# Patient Record
Sex: FEMALE | Race: WHITE | Employment: UNEMPLOYED | URBAN - METROPOLITAN AREA
[De-identification: names, ages, dates, MRNs, and addresses within clinical notes are randomized per-mention and may not be internally consistent; named-entity substitution may affect disease eponyms.]

---

## 2017-01-23 ENCOUNTER — HOSPITAL ENCOUNTER (OUTPATIENT)
Dept: OTHER | Age: 57
Discharge: HOME | End: 2017-01-23
Attending: INTERNAL MEDICINE | Admitting: INTERNAL MEDICINE

## 2017-01-23 LAB
ALBUMIN SERPL-MCNC: 3.9 GM/DL (ref 3.4–5)
ALP BLD-CCNC: 93 IU/L (ref 40–128)
ALT SERPL-CCNC: 12 U/L (ref 10–40)
ANION GAP SERPL CALCULATED.3IONS-SCNC: 17 MMOL/L (ref 4–16)
AST SERPL-CCNC: 12 IU/L (ref 15–37)
ATYPICAL LYMPHOCYTE ABSOLUTE COUNT: ABNORMAL
BANDED NEUTROPHILS ABSOLUTE COUNT: 0.07 K/CU MM
BANDED NEUTROPHILS RELATIVE PERCENT: 1 % (ref 5–11)
BASOPHILS ABSOLUTE: 0.1 K/CU MM
BASOPHILS RELATIVE PERCENT: 2 % (ref 0–1)
BILIRUB SERPL-MCNC: 0.2 MG/DL (ref 0–1)
BUN BLDV-MCNC: 13 MG/DL (ref 6–23)
CALCIUM SERPL-MCNC: 9.5 MG/DL (ref 8.3–10.6)
CHLORIDE BLD-SCNC: 90 MMOL/L (ref 99–110)
CO2: 25 MMOL/L (ref 21–32)
CREAT SERPL-MCNC: 0.7 MG/DL (ref 0.6–1.1)
DIFFERENTIAL TYPE: ABNORMAL
EOSINOPHILS ABSOLUTE: 0.2 K/CU MM
EOSINOPHILS RELATIVE PERCENT: 3 % (ref 0–3)
GFR AFRICAN AMERICAN: >60 ML/MIN/1.73M2
GFR NON-AFRICAN AMERICAN: >60 ML/MIN/1.73M2
GLUCOSE FASTING: 350 MG/DL (ref 70–99)
HCT VFR BLD CALC: 38.4 % (ref 37–47)
HEMOGLOBIN: 13 GM/DL (ref 12.5–16)
LYMPHOCYTES ABSOLUTE: 2.6 K/CU MM
LYMPHOCYTES RELATIVE PERCENT: 38 % (ref 24–44)
LYMPHOCYTES RELATIVE PERCENT: ABNORMAL
MCH RBC QN AUTO: 30.4 PG (ref 27–31)
MCHC RBC AUTO-ENTMCNC: 33.9 % (ref 32–36)
MCV RBC AUTO: 89.9 FL (ref 78–100)
MONOCYTES ABSOLUTE: 0.1 K/CU MM
MONOCYTES RELATIVE PERCENT: 2 % (ref 0–4)
PDW BLD-RTO: 12.1 % (ref 11.7–14.9)
PLATELET # BLD: 288 K/CU MM (ref 140–440)
PMV BLD AUTO: 11.6 FL (ref 7.5–11.1)
POTASSIUM SERPL-SCNC: 4.4 MMOL/L (ref 3.5–5.1)
RBC # BLD: 4.27 M/CU MM (ref 4.2–5.4)
RBC # BLD: ABNORMAL 10*6/UL
SEGMENTED NEUTROPHILS ABSOLUTE COUNT: 3.8 K/CU MM
SEGMENTED NEUTROPHILS RELATIVE PERCENT: 54 % (ref 36–66)
SODIUM BLD-SCNC: 132 MMOL/L (ref 135–145)
TOTAL PROTEIN: 6.7 GM/DL (ref 6.4–8.2)
WBC # BLD: 6.9 K/CU MM (ref 4–10.5)

## 2017-01-26 LAB — CA 27.29: 19 U/ML

## 2017-03-17 ENCOUNTER — HOSPITAL ENCOUNTER (OUTPATIENT)
Dept: CARDIOLOGY | Age: 57
Discharge: HOME | End: 2017-03-17
Attending: INTERNAL MEDICINE | Admitting: INTERNAL MEDICINE

## 2017-03-17 DIAGNOSIS — R55 SYNCOPE AND COLLAPSE: ICD-10-CM

## 2017-03-17 LAB
CORTISOL - AM: 21.8 UG/DL (ref 6–18.4)
LV EF: 48 %
LVEF MODALITY: NORMAL

## 2017-07-17 ENCOUNTER — HOSPITAL ENCOUNTER (OUTPATIENT)
Dept: OTHER | Age: 57
Discharge: HOME | End: 2017-07-17
Attending: INTERNAL MEDICINE | Admitting: INTERNAL MEDICINE

## 2017-07-17 LAB
ALBUMIN SERPL-MCNC: 4 GM/DL (ref 3.4–5)
ALP BLD-CCNC: 98 IU/L (ref 40–129)
ALT SERPL-CCNC: 13 U/L (ref 10–40)
ANION GAP SERPL CALCULATED.3IONS-SCNC: 14 MMOL/L (ref 4–16)
AST SERPL-CCNC: 12 IU/L (ref 15–37)
ATYPICAL LYMPHOCYTE ABSOLUTE COUNT: ABNORMAL
BANDED NEUTROPHILS ABSOLUTE COUNT: 0.1 K/CU MM
BANDED NEUTROPHILS RELATIVE PERCENT: 1 % (ref 5–11)
BASOPHILS ABSOLUTE: 0.2 K/CU MM
BASOPHILS RELATIVE PERCENT: 2 % (ref 0–1)
BILIRUB SERPL-MCNC: 0.2 MG/DL (ref 0–1)
BUN BLDV-MCNC: 17 MG/DL (ref 6–23)
CALCIUM SERPL-MCNC: 9.6 MG/DL (ref 8.3–10.6)
CHLORIDE BLD-SCNC: 97 MMOL/L (ref 99–110)
CO2: 28 MMOL/L (ref 21–32)
CREAT SERPL-MCNC: 0.6 MG/DL (ref 0.6–1.1)
DIFFERENTIAL TYPE: ABNORMAL
EOSINOPHILS ABSOLUTE: 0.2 K/CU MM
EOSINOPHILS RELATIVE PERCENT: 2 % (ref 0–3)
GFR AFRICAN AMERICAN: >60 ML/MIN/1.73M2
GFR NON-AFRICAN AMERICAN: >60 ML/MIN/1.73M2
GLUCOSE BLD-MCNC: 153 MG/DL (ref 70–140)
HCT VFR BLD CALC: 41.9 % (ref 37–47)
HEMOGLOBIN: 14.1 GM/DL (ref 12.5–16)
LYMPHOCYTES ABSOLUTE: 4.9 K/CU MM
LYMPHOCYTES RELATIVE PERCENT: 50 % (ref 24–44)
MCH RBC QN AUTO: 29.4 PG (ref 27–31)
MCHC RBC AUTO-ENTMCNC: 33.7 % (ref 32–36)
MCV RBC AUTO: 87.5 FL (ref 78–100)
MONOCYTES ABSOLUTE: 0.5 K/CU MM
MONOCYTES RELATIVE PERCENT: 5 % (ref 0–4)
PDW BLD-RTO: 12.9 % (ref 11.7–14.9)
PLATELET # BLD: 292 K/CU MM (ref 140–440)
PMV BLD AUTO: 10.3 FL (ref 7.5–11.1)
POTASSIUM SERPL-SCNC: 4.4 MMOL/L (ref 3.5–5.1)
RBC # BLD: 4.79 M/CU MM (ref 4.2–5.4)
SEGMENTED NEUTROPHILS ABSOLUTE COUNT: 3.9 K/CU MM
SEGMENTED NEUTROPHILS RELATIVE PERCENT: 40 % (ref 36–66)
SODIUM BLD-SCNC: 139 MMOL/L (ref 135–145)
TOTAL PROTEIN: 6.9 GM/DL (ref 6.4–8.2)
WBC # BLD: 9.8 K/CU MM (ref 4–10.5)

## 2017-07-19 LAB — CA 27.29: 16.2 U/ML

## 2017-07-25 ENCOUNTER — HOSPITAL ENCOUNTER (OUTPATIENT)
Dept: PHYSICAL THERAPY | Age: 57
Discharge: HOME | End: 2017-07-25
Attending: INTERNAL MEDICINE | Admitting: INTERNAL MEDICINE

## 2018-02-15 ENCOUNTER — HOSPITAL ENCOUNTER (OUTPATIENT)
Dept: OTHER | Age: 58
Discharge: HOME | End: 2018-02-15
Attending: INTERNAL MEDICINE | Admitting: INTERNAL MEDICINE

## 2018-02-15 LAB
ALBUMIN SERPL-MCNC: 3.9 GM/DL (ref 3.4–5)
ALP BLD-CCNC: 116 IU/L (ref 40–128)
ALT SERPL-CCNC: 9 U/L (ref 10–40)
ANION GAP SERPL CALCULATED.3IONS-SCNC: 13 MMOL/L (ref 4–16)
AST SERPL-CCNC: 12 IU/L (ref 15–37)
BILIRUB SERPL-MCNC: 0.2 MG/DL (ref 0–1)
BUN BLDV-MCNC: 13 MG/DL (ref 6–23)
CALCIUM SERPL-MCNC: 8.9 MG/DL (ref 8.3–10.6)
CHLORIDE BLD-SCNC: 96 MMOL/L (ref 99–110)
CO2: 27 MMOL/L (ref 21–32)
CREAT SERPL-MCNC: 0.5 MG/DL (ref 0.6–1.1)
FOLLICLE STIMULATING HORMONE: 17.7 MLU/ML
GFR AFRICAN AMERICAN: >60 ML/MIN/1.73M2
GFR NON-AFRICAN AMERICAN: >60 ML/MIN/1.73M2
GLUCOSE BLD-MCNC: 167 MG/DL (ref 70–99)
POTASSIUM SERPL-SCNC: 4.1 MMOL/L (ref 3.5–5.1)
SODIUM BLD-SCNC: 136 MMOL/L (ref 135–145)
TOTAL PROTEIN: 6.7 GM/DL (ref 6.4–8.2)

## 2018-02-17 LAB
CA 27.29: 16.8 U/ML
ESTRADIOL LEVEL: <20

## 2018-08-23 ENCOUNTER — HOSPITAL ENCOUNTER (OUTPATIENT)
Dept: OTHER | Age: 58
Discharge: HOME | End: 2018-08-23
Attending: INTERNAL MEDICINE | Admitting: INTERNAL MEDICINE

## 2018-08-23 LAB
ALBUMIN SERPL-MCNC: 3.8 GM/DL (ref 3.4–5)
ALP BLD-CCNC: 108 IU/L (ref 40–129)
ALT SERPL-CCNC: 8 U/L (ref 10–40)
ANION GAP SERPL CALCULATED.3IONS-SCNC: 17 MMOL/L (ref 4–16)
AST SERPL-CCNC: 10 IU/L (ref 15–37)
BILIRUB SERPL-MCNC: 0.1 MG/DL (ref 0–1)
BUN BLDV-MCNC: 14 MG/DL (ref 6–23)
CALCIUM SERPL-MCNC: 9.1 MG/DL (ref 8.3–10.6)
CHLORIDE BLD-SCNC: 95 MMOL/L (ref 99–110)
CO2: 23 MMOL/L (ref 21–32)
CREAT SERPL-MCNC: 0.5 MG/DL (ref 0.6–1.1)
GFR AFRICAN AMERICAN: >60 ML/MIN/1.73M2
GFR NON-AFRICAN AMERICAN: >60 ML/MIN/1.73M2
GLUCOSE BLD-MCNC: 369 MG/DL (ref 70–99)
POTASSIUM SERPL-SCNC: 4.3 MMOL/L (ref 3.5–5.1)
SODIUM BLD-SCNC: 135 MMOL/L (ref 135–145)
TOTAL PROTEIN: 6.6 GM/DL (ref 6.4–8.2)

## 2018-08-25 LAB — CA 27.29: 17.4 U/ML

## 2019-02-19 ENCOUNTER — HOSPITAL ENCOUNTER (OUTPATIENT)
Age: 59
Setting detail: SPECIMEN
Discharge: HOME | End: 2019-02-19
Payer: COMMERCIAL

## 2019-02-19 LAB
ALBUMIN SERPL-MCNC: 4 GM/DL (ref 3.4–5)
ALP BLD-CCNC: 101 IU/L (ref 40–128)
ALT SERPL-CCNC: 10 U/L (ref 10–40)
ANION GAP SERPL CALCULATED.3IONS-SCNC: 15 MMOL/L (ref 4–16)
AST SERPL-CCNC: 12 IU/L (ref 15–37)
BILIRUB SERPL-MCNC: 0.2 MG/DL (ref 0–1)
BUN BLDV-MCNC: 11 MG/DL (ref 6–23)
CALCIUM SERPL-MCNC: 9 MG/DL (ref 8.3–10.6)
CHLORIDE BLD-SCNC: 98 MMOL/L (ref 99–110)
CO2: 26 MMOL/L (ref 21–32)
CREAT SERPL-MCNC: 0.6 MG/DL (ref 0.6–1.1)
FOLLICLE STIMULATING HORMONE: 16.7 MLU/ML
GFR AFRICAN AMERICAN: >60 ML/MIN/1.73M2
GFR NON-AFRICAN AMERICAN: >60 ML/MIN/1.73M2
GLUCOSE BLD-MCNC: 229 MG/DL (ref 70–99)
LH: 12.5 MIU/L
POTASSIUM SERPL-SCNC: 4.7 MMOL/L (ref 3.5–5.1)
SODIUM BLD-SCNC: 139 MMOL/L (ref 135–145)
TOTAL PROTEIN: 6.9 GM/DL (ref 6.4–8.2)

## 2019-02-19 PROCEDURE — 83001 ASSAY OF GONADOTROPIN (FSH): CPT

## 2019-02-19 PROCEDURE — 80053 COMPREHEN METABOLIC PANEL: CPT

## 2019-02-19 PROCEDURE — 83002 ASSAY OF GONADOTROPIN (LH): CPT

## 2019-02-19 PROCEDURE — 82670 ASSAY OF TOTAL ESTRADIOL: CPT

## 2019-02-19 PROCEDURE — 86300 IMMUNOASSAY TUMOR CA 15-3: CPT

## 2019-02-22 LAB
CA 27.29: 16.6 U/ML
ESTRADIOL LEVEL: <20

## 2019-08-22 ENCOUNTER — HOSPITAL ENCOUNTER (OUTPATIENT)
Age: 59
Setting detail: SPECIMEN
Discharge: HOME | End: 2019-08-22
Payer: COMMERCIAL

## 2019-08-22 LAB
ALBUMIN SERPL-MCNC: 3.9 GM/DL (ref 3.4–5)
ALP BLD-CCNC: 89 IU/L (ref 40–128)
ALT SERPL-CCNC: 9 U/L (ref 10–40)
ANION GAP SERPL CALCULATED.3IONS-SCNC: 11 MMOL/L (ref 4–16)
AST SERPL-CCNC: 12 IU/L (ref 15–37)
BILIRUB SERPL-MCNC: 0.2 MG/DL (ref 0–1)
BUN BLDV-MCNC: 14 MG/DL (ref 6–23)
CALCIUM SERPL-MCNC: 9 MG/DL (ref 8.3–10.6)
CHLORIDE BLD-SCNC: 103 MMOL/L (ref 99–110)
CO2: 27 MMOL/L (ref 21–32)
CREAT SERPL-MCNC: 0.5 MG/DL (ref 0.6–1.1)
GFR AFRICAN AMERICAN: >60 ML/MIN/1.73M2
GFR NON-AFRICAN AMERICAN: >60 ML/MIN/1.73M2
GLUCOSE BLD-MCNC: 199 MG/DL (ref 70–99)
POTASSIUM SERPL-SCNC: 4.4 MMOL/L (ref 3.5–5.1)
SODIUM BLD-SCNC: 141 MMOL/L (ref 135–145)
TOTAL PROTEIN: 6.6 GM/DL (ref 6.4–8.2)

## 2019-08-22 PROCEDURE — 80053 COMPREHEN METABOLIC PANEL: CPT

## 2020-02-24 ENCOUNTER — HOSPITAL ENCOUNTER (OUTPATIENT)
Dept: INFUSION THERAPY | Age: 60
Discharge: HOME | End: 2020-02-24
Payer: COMMERCIAL

## 2020-02-24 LAB
ALBUMIN SERPL-MCNC: 3.9 GM/DL (ref 3.4–5)
ALP BLD-CCNC: 85 IU/L (ref 40–128)
ALT SERPL-CCNC: 10 U/L (ref 10–40)
ANION GAP SERPL CALCULATED.3IONS-SCNC: 11 MMOL/L (ref 4–16)
AST SERPL-CCNC: 12 IU/L (ref 15–37)
BASOPHILS ABSOLUTE: 0 K/CU MM
BASOPHILS RELATIVE PERCENT: 0.5 % (ref 0–1)
BILIRUB SERPL-MCNC: 0.2 MG/DL (ref 0–1)
BUN BLDV-MCNC: 17 MG/DL (ref 6–23)
CALCIUM SERPL-MCNC: 9.1 MG/DL (ref 8.3–10.6)
CHLORIDE BLD-SCNC: 103 MMOL/L (ref 99–110)
CO2: 27 MMOL/L (ref 21–32)
CREAT SERPL-MCNC: 0.5 MG/DL (ref 0.6–1.1)
DIFFERENTIAL TYPE: ABNORMAL
EOSINOPHILS ABSOLUTE: 0.3 K/CU MM
EOSINOPHILS RELATIVE PERCENT: 3.7 % (ref 0–3)
GFR AFRICAN AMERICAN: >60 ML/MIN/1.73M2
GFR NON-AFRICAN AMERICAN: >60 ML/MIN/1.73M2
GLUCOSE BLD-MCNC: 130 MG/DL (ref 70–99)
HCT VFR BLD CALC: 40.6 % (ref 37–47)
HEMOGLOBIN: 13.3 GM/DL (ref 12.5–16)
LYMPHOCYTES ABSOLUTE: 3.1 K/CU MM
LYMPHOCYTES RELATIVE PERCENT: 40.2 % (ref 24–44)
MCH RBC QN AUTO: 28.9 PG (ref 27–31)
MCHC RBC AUTO-ENTMCNC: 32.8 % (ref 32–36)
MCV RBC AUTO: 88.3 FL (ref 78–100)
MONOCYTES ABSOLUTE: 0.6 K/CU MM
MONOCYTES RELATIVE PERCENT: 7.5 % (ref 0–4)
PDW BLD-RTO: 13.7 % (ref 11.7–14.9)
PLATELET # BLD: 299 K/CU MM (ref 140–440)
PMV BLD AUTO: 10.1 FL (ref 7.5–11.1)
POTASSIUM SERPL-SCNC: 4.6 MMOL/L (ref 3.5–5.1)
RBC # BLD: 4.6 M/CU MM (ref 4.2–5.4)
SEGMENTED NEUTROPHILS ABSOLUTE COUNT: 3.7 K/CU MM
SEGMENTED NEUTROPHILS RELATIVE PERCENT: 48.1 % (ref 36–66)
SODIUM BLD-SCNC: 141 MMOL/L (ref 135–145)
TOTAL PROTEIN: 6.4 GM/DL (ref 6.4–8.2)
WBC # BLD: 7.6 K/CU MM (ref 4–10.5)

## 2020-02-24 PROCEDURE — 36415 COLL VENOUS BLD VENIPUNCTURE: CPT

## 2020-02-24 PROCEDURE — 85025 COMPLETE CBC W/AUTO DIFF WBC: CPT

## 2020-02-24 PROCEDURE — 36591 DRAW BLOOD OFF VENOUS DEVICE: CPT

## 2020-02-24 PROCEDURE — 6360000002 HC RX W HCPCS

## 2020-02-24 PROCEDURE — 80053 COMPREHEN METABOLIC PANEL: CPT

## 2020-02-24 RX ORDER — HEPARIN SODIUM (PORCINE) LOCK FLUSH IV SOLN 100 UNIT/ML 100 UNIT/ML
SOLUTION INTRAVENOUS
Status: DISPENSED
Start: 2020-02-24 | End: 2020-02-24

## 2020-08-03 PROBLEM — C50.212 MALIGNANT NEOPLASM OF UPPER-INNER QUADRANT OF LEFT FEMALE BREAST (HCC): Status: ACTIVE | Noted: 2020-08-03

## 2020-08-03 PROBLEM — E86.0 DEHYDRATION: Status: ACTIVE | Noted: 2020-08-03

## 2020-09-02 PROBLEM — E86.0 DEHYDRATION: Status: RESOLVED | Noted: 2020-08-03 | Resolved: 2020-09-02

## 2020-09-02 RX ORDER — TAMOXIFEN CITRATE 20 MG/1
TABLET ORAL
Qty: 30 TABLET | Refills: 0 | Status: SHIPPED | OUTPATIENT
Start: 2020-09-02 | End: 2020-09-14 | Stop reason: SDUPTHER

## 2020-09-08 ENCOUNTER — HOSPITAL ENCOUNTER (OUTPATIENT)
Dept: INFUSION THERAPY | Age: 60
Discharge: HOME | End: 2020-09-08
Payer: COMMERCIAL

## 2020-09-08 DIAGNOSIS — C50.212 MALIGNANT NEOPLASM OF UPPER-INNER QUADRANT OF LEFT FEMALE BREAST, UNSPECIFIED ESTROGEN RECEPTOR STATUS (HCC): Primary | ICD-10-CM

## 2020-09-08 DIAGNOSIS — C50.212 MALIGNANT NEOPLASM OF UPPER-INNER QUADRANT OF LEFT BREAST IN FEMALE, ESTROGEN RECEPTOR POSITIVE (HCC): ICD-10-CM

## 2020-09-08 DIAGNOSIS — Z17.0 MALIGNANT NEOPLASM OF UPPER-INNER QUADRANT OF LEFT BREAST IN FEMALE, ESTROGEN RECEPTOR POSITIVE (HCC): ICD-10-CM

## 2020-09-08 LAB
ALBUMIN SERPL-MCNC: 3.9 GM/DL (ref 3.4–5)
ALP BLD-CCNC: 94 IU/L (ref 40–128)
ALT SERPL-CCNC: 12 U/L (ref 10–40)
ANION GAP SERPL CALCULATED.3IONS-SCNC: 12 MMOL/L (ref 4–16)
AST SERPL-CCNC: 11 IU/L (ref 15–37)
BASOPHILS ABSOLUTE: 0.1 K/CU MM
BASOPHILS RELATIVE PERCENT: 0.8 % (ref 0–1)
BILIRUB SERPL-MCNC: 0.2 MG/DL (ref 0–1)
BUN BLDV-MCNC: 18 MG/DL (ref 6–23)
CALCIUM SERPL-MCNC: 9.2 MG/DL (ref 8.3–10.6)
CHLORIDE BLD-SCNC: 103 MMOL/L (ref 99–110)
CO2: 28 MMOL/L (ref 21–32)
CREAT SERPL-MCNC: 0.6 MG/DL (ref 0.6–1.1)
DIFFERENTIAL TYPE: ABNORMAL
EOSINOPHILS ABSOLUTE: 0.3 K/CU MM
EOSINOPHILS RELATIVE PERCENT: 3.4 % (ref 0–3)
GFR AFRICAN AMERICAN: >60 ML/MIN/1.73M2
GFR NON-AFRICAN AMERICAN: >60 ML/MIN/1.73M2
GLUCOSE BLD-MCNC: 223 MG/DL (ref 70–99)
HCT VFR BLD CALC: 39.3 % (ref 37–47)
HEMOGLOBIN: 13.1 GM/DL (ref 12.5–16)
LYMPHOCYTES ABSOLUTE: 3.3 K/CU MM
LYMPHOCYTES RELATIVE PERCENT: 41.5 % (ref 24–44)
MCH RBC QN AUTO: 29.5 PG (ref 27–31)
MCHC RBC AUTO-ENTMCNC: 33.3 % (ref 32–36)
MCV RBC AUTO: 88.5 FL (ref 78–100)
MONOCYTES ABSOLUTE: 0.6 K/CU MM
MONOCYTES RELATIVE PERCENT: 7.7 % (ref 0–4)
PDW BLD-RTO: 13.8 % (ref 11.7–14.9)
PLATELET # BLD: 290 K/CU MM (ref 140–440)
PMV BLD AUTO: 9.9 FL (ref 7.5–11.1)
POTASSIUM SERPL-SCNC: 4.2 MMOL/L (ref 3.5–5.1)
RBC # BLD: 4.44 M/CU MM (ref 4.2–5.4)
SEGMENTED NEUTROPHILS ABSOLUTE COUNT: 3.7 K/CU MM
SEGMENTED NEUTROPHILS RELATIVE PERCENT: 46.6 % (ref 36–66)
SODIUM BLD-SCNC: 143 MMOL/L (ref 135–145)
TOTAL PROTEIN: 6.4 GM/DL (ref 6.4–8.2)
WBC # BLD: 7.8 K/CU MM (ref 4–10.5)

## 2020-09-08 PROCEDURE — 6360000002 HC RX W HCPCS: Performed by: INTERNAL MEDICINE

## 2020-09-08 PROCEDURE — 80053 COMPREHEN METABOLIC PANEL: CPT

## 2020-09-08 PROCEDURE — 36591 DRAW BLOOD OFF VENOUS DEVICE: CPT

## 2020-09-08 PROCEDURE — 2580000003 HC RX 258: Performed by: INTERNAL MEDICINE

## 2020-09-08 PROCEDURE — 85025 COMPLETE CBC W/AUTO DIFF WBC: CPT

## 2020-09-08 RX ORDER — SODIUM CHLORIDE 0.9 % (FLUSH) 0.9 %
20 SYRINGE (ML) INJECTION PRN
Status: DISCONTINUED | OUTPATIENT
Start: 2020-09-08 | End: 2020-09-09 | Stop reason: HOSPADM

## 2020-09-08 RX ORDER — SODIUM CHLORIDE 0.9 % (FLUSH) 0.9 %
10 SYRINGE (ML) INJECTION PRN
Status: CANCELLED | OUTPATIENT
Start: 2020-09-08

## 2020-09-08 RX ORDER — HEPARIN SODIUM (PORCINE) LOCK FLUSH IV SOLN 100 UNIT/ML 100 UNIT/ML
500 SOLUTION INTRAVENOUS PRN
Status: CANCELLED | OUTPATIENT
Start: 2020-09-08

## 2020-09-08 RX ORDER — HEPARIN SODIUM (PORCINE) LOCK FLUSH IV SOLN 100 UNIT/ML 100 UNIT/ML
500 SOLUTION INTRAVENOUS PRN
Status: DISCONTINUED | OUTPATIENT
Start: 2020-09-08 | End: 2020-09-09 | Stop reason: HOSPADM

## 2020-09-08 RX ORDER — SODIUM CHLORIDE 0.9 % (FLUSH) 0.9 %
20 SYRINGE (ML) INJECTION PRN
Status: CANCELLED | OUTPATIENT
Start: 2020-09-08

## 2020-09-08 RX ADMIN — Medication 500 UNITS: at 09:33

## 2020-09-08 RX ADMIN — SODIUM CHLORIDE, PRESERVATIVE FREE 20 ML: 5 INJECTION INTRAVENOUS at 09:33

## 2020-09-08 NOTE — PROGRESS NOTES
Blood draw via right chest mediport. Labs obtained. Flushed per protocol, emely duvall'd, bandage applied. Tolerated well.

## 2020-09-10 NOTE — PROGRESS NOTES
Patient Name: Fernando Mendoza  Patient : 1960  Patient MRN: [id number]     Primary Oncologist: Day Bustillo MD  Referring Provider: Dionisio Hutchinson MD     Date of Service: 2020      Chief Complaint:   Chief Complaint   Patient presents with    Medication Refill    Discuss Labs     results     She came in for follow-up visit. Patient Active Problem List:     Malignant neoplasm of upper-inner quadrant of left female breast [facility])     HPI:   Mrs. Roxann Cranker is a pleasant 51-year-old  female patient with history of stage T1C, N0, ER/CT positive, HER-2/jose luis positive invasive ductal carcinoma of the left breast status post lumpectomy on 2013. Patient felt a lump to her left breast the first week of 2013. Prior mammogram was a year and a half ago. Mammogram on 2013 showed 2 centimeter lobular nodularity containing microcalcification at the anteromedial aspect of the left breast and focal small collections of microcalcification involving the posterior third of the upper outer quadrant of the right breast. Stereotactic biopsy of the right breast showed fibrocystic change, negative for malignancy; and the ultrasound biopsy of the left breast showed grade 3 invasive ductal carcinoma with grade 3 DCIS. ER was more than 90 percent, CT 30 percent, Ki67 35 percent, and HER-2/jose luis was positive by IHC and FISH. Chest x-ray on 2013 was normal.  Bone scan on 2013 showed no evidence of osseous metastases. CT abdomen and pelvis on 2013 showed bilateral ovarian cystic lesions. She had left breast lumpectomy with sentinel lymph node biopsy on 2013. Pathology report showed single focal invasive ductal carcinoma grade 3 with grade 3 DCIS, ER/CT positive, HER-2/jose luis positive. Two sentinel lymph nodes were negative. Margins for invasive carcinoma and DCIS were negative. We discussed about the adjuvant chemotherapy as per the NCCN Guidelines. Since she has triple-positive breast cancer, she was strongly recommended the adjuvant chemotherapy and she opted to ACTH regimen. She received first cycle on September 6, 2013 and completed the [facility] on November 15, 2013. After completion of AC, she was placed on weekly Taxol/Herceptin and finished the weekly Taxol on March 7, 2014. Echocardiogram in February 2014 showed EF of 50 percent. She started adjuvant radiation therapy on April 21, 2014. We discussed about the hormonal therapy, since I believe that she is in perimenopausal stage. I put her on Tamoxifen after completion of the radiation. We discussed the risks and benefits of the Tamoxifen. She finished adjuvant radiation therapy on May 16, 2014. Right lower extremity venous duplex study on May 8, 2014 was negative for DVT. Biopsy from the abdominal wall nodule on July 22, 2014 showed ischemic fat necrosis with septal granulomatous inflammation with prominent eosinophils. This could be a reaction. Vasculitis fungi and microbacterial were not seen. She gives herself insulin injections to her abdomen. Echocardiogram on July 29, 2014 showed ejection fraction of 50 percent. Mammogram in March 2014 was negative. CT brain on October 7, 2014 due to the headaches showed a normal study. Eventually her Herceptin was stopped on October 17, 2014, and she noticed improvement of the symptom. She had slight headache, but no migraine after discontinuation of the Herceptin. Due to the symptom, we eventually decided to stop the Herceptin and recommended the tamoxifen. I reminded her to have regular Pap smears while she is on tamoxifen. CT abdomen and pelvis in January 2015 showed a normal study. No focal bony abnormalities were seen. She refused to have a bone scan. She could have fibromyalgia. Blood test in February 2015 showed normal CBC. CMP was stable, except for the blood sugar of 424.   I advised her to watch her blood sugar at home and her diet. CA 27-29 was 12. She was diagnosed with fibromyalgia. She was in the emergency room for the migraine and given prescriptions for the migraine, butalbital, aspirin, caffeine pill. Mammogram in March 2015 was negative. Blood tests in May 2015 showed normal CBC. CMP was stable. She had right shoulder surgery by Dr. Flower Weller in May 2015. She had MRI of the neck, cervical spine and the shoulder. Last menstruation was on September 17, 2015. Blood test on January 29, 2016, revealed estradiol level less than 20. FSH was 19.1 and LH 13.4. Based on this information, I believe she is not in a postmenopausal state yet; therefore, I recommended she continue with the tamoxifen. I recommended she have yearly Pap smears. I referred her to see GYN for the routine yearly Pap smear. CBC was within normal limits. CMP was stable. Mammogram in March 2016 was negative. She was seen by Dr. Scott Smith, who felt a lump to the left breast and reportedly ultrasound of the left breast revealed cyst in her left breast.  She was scheduled to see a cardiologist at the [facility] for the workup of her syncope. Pap smear in March 2016 was reportedly negative. She was seen the cardiologist and reportedly her cardiac cath was good. Blood test in January 2017 revealed normal CBC. CA 27-29 was 19. Reportedly, she had colonoscopy, upper endoscopic study in February 2017 at [facility]. She was found to have small polyps. Mammogram in March or April 2017 at [facility] was negative, according to her. She has been followed by Dr. Roberto Carlos Han for syncope. Mammogram in September 2019 was benign. Labs in February 2020 were reviewed  September 14, 2020 she came in for follow-up visit. I remind her about the Pap smear. She will follow-up with GYN. She has no new signs or symptoms of recurrent disease. Hormonal study in January 2019 revealed that she is not in postmenopausal state.   She is agreeable to extend tamoxifen more than 5 years. I gave refill of tamoxifen. She is scheduled for mammogram in September 2020. She denied any NVD. No melena or hematuria. No headache or dizziness. No weight loss or night sweat. I recommend her Mediport flush every 3 months. Labs in September 2020 were reviewed. Reportedly hemoglobin A1c has gone down. PAST MEDICAL HISTORY:  Hypertension since 2008, diabetes since 2001, history of three arthroscopic knee surgeries, and cholecystectomy. Breast cancer. Obesity. Syncope    FAMILY HISTORY:  Mother had breast cancer twice at age 29 and 47 and had likely metastatic disease to the bone and brain. Maternal grandmother had stomach cancer in her early 52's. Maternal grandmother lived to the age of 80. SOCIAL HISTORY:  She denied any history of smoking, alcohol, or illicit drug use. She is  and has one child. ALLERGIES:  Reviewed as per chart. MEDICATIONS:  Reviewed as per chart. LABORATORY STUDIES:   September 5, 2014: White cell 7.9, hemoglobin 12.1, platelet 144. CMP was stable, with glucose 201. I advised her to cut down the carbohydrate consumption. CA 27-29 was 16.1. Review of Systems: \"Per interval history; otherwise 10 point ROS is negative. \"     Vital Signs:  /86 (Site: Right Upper Arm, Position: Sitting, Cuff Size: Large Adult)   Pulse 80   Temp 97.6 °F (36.4 °C) (Tympanic)   Resp 16   Ht 5' 9\" (1.753 m)   Wt 266 lb 3.2 oz (120.7 kg)   SpO2 97%   BMI 39.31 kg/m²     Physical Exam:  CONSTITUTIONAL: awake, alert, cooperative, no apparent distress   EYES: pupils equal, round and reactive to light, sclera clear and conjunctiva normal  ENT: Normocephalic, without obvious abnormality, atraumatic  NECK: supple, symmetrical, no jugular venous distension and no carotid bruits   HEMATOLOGIC/LYMPHATIC: no cervical, supraclavicular or axillary lymphadenopathy   LUNGS: no increased work of breathing and clear to auscultation  BREAST: Healing surgical incision noted to the left breast s/p lumpectomy. No palpable lump to either breast.     CARDIOVASCULAR: regular rate and rhythm, normal S1 and S2, no murmur noted  ABDOMEN: normal bowel sounds x 4, soft, non-distended, non-tender, no masses palpated, no hepatosplenomegaly   MUSCULOSKELETAL: full range of motion noted, tone is normal  NEUROLOGIC: awake, alert, oriented to name, place and time. Motor skills grossly intact. SKIN: Normal skin color, texture, turgor and no jaundice. appears intact   EXTREMITIES: no LE edema. No  Cyanosis.     Labs:  Hematology:  Lab Results   Component Value Date    WBC 7.8 09/08/2020    RBC 4.44 09/08/2020    HGB 13.1 09/08/2020    HCT 39.3 09/08/2020    MCV 88.5 09/08/2020    MCH 29.5 09/08/2020    MCHC 33.3 09/08/2020    RDW 13.8 09/08/2020     09/08/2020    MPV 9.9 09/08/2020    BANDSPCT 1 (L) 07/17/2017    SEGSPCT 46.6 09/08/2020    EOSRELPCT 3.4 (H) 09/08/2020    BASOPCT 0.8 09/08/2020    LYMPHOPCT 41.5 09/08/2020    MONOPCT 7.7 (H) 09/08/2020    BANDABS 0.10 07/17/2017    SEGSABS 3.7 09/08/2020    EOSABS 0.3 09/08/2020    BASOSABS 0.1 09/08/2020    LYMPHSABS 3.3 09/08/2020    MONOSABS 0.6 09/08/2020    DIFFTYPE AUTOMATED DIFFERENTIAL 09/08/2020    PLTM FEW PLT CLUMPS NOTED 09/10/2015     Chemistry:  Lab Results   Component Value Date     09/08/2020    K 4.2 09/08/2020     09/08/2020    CO2 28 09/08/2020    BUN 18 09/08/2020    CREATININE 0.6 09/08/2020    GLUCOSE 223 (H) 09/08/2020    CALCIUM 9.2 09/08/2020    PROT 6.4 09/08/2020    LABALBU 3.9 09/08/2020    BILITOT 0.2 09/08/2020    ALKPHOS 94 09/08/2020    AST 11 (L) 09/08/2020    ALT 12 09/08/2020    LABGLOM >60 09/08/2020    GFRAA >60 09/08/2020    POCGLU 210 (H) 11/17/2016     Immunology:  Lab Results   Component Value Date    PROT 6.4 09/08/2020     Coagulation Panel:  Lab Results   Component Value Date    PROTIME 10.8 09/13/2013    INR 1.00 09/13/2013    APTT 24.7 09/13/2013    DDIMER

## 2020-09-14 ENCOUNTER — HOSPITAL ENCOUNTER (OUTPATIENT)
Dept: INFUSION THERAPY | Age: 60
Discharge: HOME | End: 2020-09-14
Payer: COMMERCIAL

## 2020-09-14 ENCOUNTER — OFFICE VISIT (OUTPATIENT)
Dept: ONCOLOGY | Age: 60
End: 2020-09-14
Payer: COMMERCIAL

## 2020-09-14 VITALS
RESPIRATION RATE: 16 BRPM | WEIGHT: 266.2 LBS | HEIGHT: 69 IN | DIASTOLIC BLOOD PRESSURE: 86 MMHG | TEMPERATURE: 97.6 F | OXYGEN SATURATION: 97 % | BODY MASS INDEX: 39.43 KG/M2 | HEART RATE: 80 BPM | SYSTOLIC BLOOD PRESSURE: 129 MMHG

## 2020-09-14 PROCEDURE — 99211 OFF/OP EST MAY X REQ PHY/QHP: CPT

## 2020-09-14 PROCEDURE — 99213 OFFICE O/P EST LOW 20 MIN: CPT | Performed by: INTERNAL MEDICINE

## 2020-09-14 RX ORDER — TAMOXIFEN CITRATE 20 MG/1
20 TABLET ORAL DAILY
Qty: 90 TABLET | Refills: 1 | Status: SHIPPED | OUTPATIENT
Start: 2020-09-14 | End: 2022-03-03

## 2020-09-14 ASSESSMENT — PATIENT HEALTH QUESTIONNAIRE - PHQ9
SUM OF ALL RESPONSES TO PHQ QUESTIONS 1-9: 0
2. FEELING DOWN, DEPRESSED OR HOPELESS: 0
1. LITTLE INTEREST OR PLEASURE IN DOING THINGS: 0
SUM OF ALL RESPONSES TO PHQ QUESTIONS 1-9: 0
SUM OF ALL RESPONSES TO PHQ9 QUESTIONS 1 & 2: 0

## 2020-09-14 NOTE — PROGRESS NOTES
MA Rooming Questions  Patient: Aaron Burton  MRN: [id number]    Date: 9/14/2020        1. Do you have any new issues?   no         2. Do you need any refills on medications? yes - Tamoxifen for a 90-day supply    3. Have you had any imaging done since your last visit?   no    4. Have you been hospitalized or seen in the emergency room since your last visit here?   no    5. Did the patient have a depression screening completed today?  Yes    PHQ-9 Total Score: 0 (9/14/2020  9:09 AM)       PHQ-9 Given to (if applicable):               PHQ-9 Score (if applicable):                     [] Positive     []  Negative              Does question #9 need addressed (if applicable)                     [] Yes    []  No               Abi Stein MA

## 2020-12-29 ENCOUNTER — CLINICAL DOCUMENTATION (OUTPATIENT)
Dept: ONCOLOGY | Age: 60
End: 2020-12-29

## 2021-03-15 ENCOUNTER — OFFICE VISIT (OUTPATIENT)
Dept: ONCOLOGY | Age: 61
End: 2021-03-15
Payer: COMMERCIAL

## 2021-03-15 ENCOUNTER — HOSPITAL ENCOUNTER (OUTPATIENT)
Dept: INFUSION THERAPY | Age: 61
Discharge: HOME | End: 2021-03-15
Payer: COMMERCIAL

## 2021-03-15 VITALS
BODY MASS INDEX: 36.71 KG/M2 | DIASTOLIC BLOOD PRESSURE: 69 MMHG | SYSTOLIC BLOOD PRESSURE: 127 MMHG | RESPIRATION RATE: 16 BRPM | WEIGHT: 262.2 LBS | HEART RATE: 86 BPM | OXYGEN SATURATION: 95 % | TEMPERATURE: 97.5 F | HEIGHT: 71 IN

## 2021-03-15 DIAGNOSIS — Z17.0 MALIGNANT NEOPLASM OF UPPER-INNER QUADRANT OF LEFT BREAST IN FEMALE, ESTROGEN RECEPTOR POSITIVE (HCC): Primary | ICD-10-CM

## 2021-03-15 DIAGNOSIS — C50.212 MALIGNANT NEOPLASM OF UPPER-INNER QUADRANT OF LEFT BREAST IN FEMALE, ESTROGEN RECEPTOR POSITIVE (HCC): Primary | ICD-10-CM

## 2021-03-15 DIAGNOSIS — C50.212 MALIGNANT NEOPLASM OF UPPER-INNER QUADRANT OF LEFT FEMALE BREAST, UNSPECIFIED ESTROGEN RECEPTOR STATUS (HCC): ICD-10-CM

## 2021-03-15 LAB
ALBUMIN SERPL-MCNC: 3.8 GM/DL (ref 3.4–5)
ALP BLD-CCNC: 81 IU/L (ref 40–129)
ALT SERPL-CCNC: 11 U/L (ref 10–40)
ANION GAP SERPL CALCULATED.3IONS-SCNC: 8 MMOL/L (ref 4–16)
AST SERPL-CCNC: 12 IU/L (ref 15–37)
BASOPHILS ABSOLUTE: 0.1 K/CU MM
BASOPHILS RELATIVE PERCENT: 0.7 % (ref 0–1)
BILIRUB SERPL-MCNC: 0.1 MG/DL (ref 0–1)
BUN BLDV-MCNC: 16 MG/DL (ref 6–23)
CALCIUM SERPL-MCNC: 9 MG/DL (ref 8.3–10.6)
CHLORIDE BLD-SCNC: 100 MMOL/L (ref 99–110)
CO2: 31 MMOL/L (ref 21–32)
CREAT SERPL-MCNC: 0.7 MG/DL (ref 0.6–1.1)
DIFFERENTIAL TYPE: ABNORMAL
EOSINOPHILS ABSOLUTE: 0.2 K/CU MM
EOSINOPHILS RELATIVE PERCENT: 2.6 % (ref 0–3)
GFR AFRICAN AMERICAN: >60 ML/MIN/1.73M2
GFR NON-AFRICAN AMERICAN: >60 ML/MIN/1.73M2
GLUCOSE BLD-MCNC: 133 MG/DL (ref 70–99)
HCT VFR BLD CALC: 41.7 % (ref 37–47)
HEMOGLOBIN: 13.9 GM/DL (ref 12.5–16)
LYMPHOCYTES ABSOLUTE: 3.7 K/CU MM
LYMPHOCYTES RELATIVE PERCENT: 44 % (ref 24–44)
MCH RBC QN AUTO: 28.8 PG (ref 27–31)
MCHC RBC AUTO-ENTMCNC: 33.3 % (ref 32–36)
MCV RBC AUTO: 86.5 FL (ref 78–100)
MONOCYTES ABSOLUTE: 0.6 K/CU MM
MONOCYTES RELATIVE PERCENT: 7.1 % (ref 0–4)
PDW BLD-RTO: 14.3 % (ref 11.7–14.9)
PLATELET # BLD: 303 K/CU MM (ref 140–440)
PMV BLD AUTO: 9.8 FL (ref 7.5–11.1)
POTASSIUM SERPL-SCNC: 4 MMOL/L (ref 3.5–5.1)
RBC # BLD: 4.82 M/CU MM (ref 4.2–5.4)
SEGMENTED NEUTROPHILS ABSOLUTE COUNT: 3.8 K/CU MM
SEGMENTED NEUTROPHILS RELATIVE PERCENT: 45.6 % (ref 36–66)
SODIUM BLD-SCNC: 139 MMOL/L (ref 135–145)
TOTAL PROTEIN: 6.6 GM/DL (ref 6.4–8.2)
WBC # BLD: 8.4 K/CU MM (ref 4–10.5)

## 2021-03-15 PROCEDURE — 99211 OFF/OP EST MAY X REQ PHY/QHP: CPT

## 2021-03-15 PROCEDURE — 85025 COMPLETE CBC W/AUTO DIFF WBC: CPT

## 2021-03-15 PROCEDURE — 96523 IRRIG DRUG DELIVERY DEVICE: CPT

## 2021-03-15 PROCEDURE — 80053 COMPREHEN METABOLIC PANEL: CPT

## 2021-03-15 PROCEDURE — 2580000003 HC RX 258: Performed by: INTERNAL MEDICINE

## 2021-03-15 PROCEDURE — 99214 OFFICE O/P EST MOD 30 MIN: CPT | Performed by: NURSE PRACTITIONER

## 2021-03-15 PROCEDURE — 6360000002 HC RX W HCPCS: Performed by: INTERNAL MEDICINE

## 2021-03-15 RX ORDER — SODIUM CHLORIDE 0.9 % (FLUSH) 0.9 %
20 SYRINGE (ML) INJECTION PRN
Status: CANCELLED | OUTPATIENT
Start: 2021-03-15

## 2021-03-15 RX ORDER — SODIUM CHLORIDE 0.9 % (FLUSH) 0.9 %
10 SYRINGE (ML) INJECTION PRN
Status: DISCONTINUED | OUTPATIENT
Start: 2021-03-15 | End: 2021-03-16 | Stop reason: HOSPADM

## 2021-03-15 RX ORDER — SODIUM CHLORIDE 0.9 % (FLUSH) 0.9 %
10 SYRINGE (ML) INJECTION PRN
Status: CANCELLED | OUTPATIENT
Start: 2021-03-15

## 2021-03-15 RX ORDER — SODIUM CHLORIDE 0.9 % (FLUSH) 0.9 %
20 SYRINGE (ML) INJECTION PRN
Status: DISCONTINUED | OUTPATIENT
Start: 2021-03-15 | End: 2021-03-16 | Stop reason: HOSPADM

## 2021-03-15 RX ORDER — HEPARIN SODIUM (PORCINE) LOCK FLUSH IV SOLN 100 UNIT/ML 100 UNIT/ML
500 SOLUTION INTRAVENOUS PRN
Status: DISCONTINUED | OUTPATIENT
Start: 2021-03-15 | End: 2021-03-16 | Stop reason: HOSPADM

## 2021-03-15 RX ORDER — HEPARIN SODIUM (PORCINE) LOCK FLUSH IV SOLN 100 UNIT/ML 100 UNIT/ML
500 SOLUTION INTRAVENOUS PRN
Status: CANCELLED | OUTPATIENT
Start: 2021-03-15

## 2021-03-15 RX ADMIN — Medication 500 UNITS: at 09:47

## 2021-03-15 RX ADMIN — SODIUM CHLORIDE, PRESERVATIVE FREE 20 ML: 5 INJECTION INTRAVENOUS at 09:47

## 2021-03-15 RX ADMIN — SODIUM CHLORIDE, PRESERVATIVE FREE 10 ML: 5 INJECTION INTRAVENOUS at 09:47

## 2021-03-15 ASSESSMENT — PATIENT HEALTH QUESTIONNAIRE - PHQ9
2. FEELING DOWN, DEPRESSED OR HOPELESS: 0
1. LITTLE INTEREST OR PLEASURE IN DOING THINGS: 0

## 2021-03-15 NOTE — PROGRESS NOTES
Pt. Here for port flush and blood work. Pt. Planning to have mediport removed. Right upper chest mediport accessed without difficulty, good blood return noted. Lab work drawn as ordered. Mediport flushed per protocol and de-accessed, 2x2 and band-aide applied to site.

## 2021-03-15 NOTE — PROGRESS NOTES
Patient Name: Wilber Mcgowan  Patient : 1960  Patient MRN: [id number]     Primary Oncologist: Michael Almeida MD  Referring Provider: Dorothy Bello MD     Date of Service: 3/15/2021      Chief Complaint:   No chief complaint on file. She came in for follow-up visit. Patient Active Problem List:     Malignant neoplasm of upper-inner quadrant of left female breast [facility])     HPI:   Mrs. Sherry Das is a pleasant 82-year-old  female patient with history of stage T1C, N0, ER/HI positive, HER-2/jose lius positive invasive ductal carcinoma of the left breast status post lumpectomy on 2013. Patient felt a lump to her left breast the first week of 2013. Prior mammogram was a year and a half ago. Mammogram on 2013 showed 2 centimeter lobular nodularity containing microcalcification at the anteromedial aspect of the left breast and focal small collections of microcalcification involving the posterior third of the upper outer quadrant of the right breast. Stereotactic biopsy of the right breast showed fibrocystic change, negative for malignancy; and the ultrasound biopsy of the left breast showed grade 3 invasive ductal carcinoma with grade 3 DCIS. ER was more than 90 percent, HI 30 percent, Ki67 35 percent, and HER-2/jose luis was positive by IHC and FISH. Chest x-ray on 2013 was normal.  Bone scan on 2013 showed no evidence of osseous metastases. CT abdomen and pelvis on 2013 showed bilateral ovarian cystic lesions. She had left breast lumpectomy with sentinel lymph node biopsy on 2013. Pathology report showed single focal invasive ductal carcinoma grade 3 with grade 3 DCIS, ER/HI positive, HER-2/jose luis positive. Two sentinel lymph nodes were negative. Margins for invasive carcinoma and DCIS were negative. We discussed about the adjuvant chemotherapy as per the NCCN Guidelines.  Since she has triple-positive breast cancer, she was strongly recommended the adjuvant chemotherapy and she opted to ACTH regimen. She received first cycle on September 6, 2013 and completed the [facility] on November 15, 2013. After completion of AC, she was placed on weekly Taxol/Herceptin and finished the weekly Taxol on March 7, 2014. Echocardiogram in February 2014 showed EF of 50 percent. She started adjuvant radiation therapy on April 21, 2014. We discussed about the hormonal therapy, since I believe that she is in perimenopausal stage. I put her on Tamoxifen after completion of the radiation. We discussed the risks and benefits of the Tamoxifen. She finished adjuvant radiation therapy on May 16, 2014. Right lower extremity venous duplex study on May 8, 2014 was negative for DVT. Biopsy from the abdominal wall nodule on July 22, 2014 showed ischemic fat necrosis with septal granulomatous inflammation with prominent eosinophils. This could be a reaction. Vasculitis fungi and microbacterial were not seen. She gives herself insulin injections to her abdomen. Echocardiogram on July 29, 2014 showed ejection fraction of 50 percent. Mammogram in March 2014 was negative. CT brain on October 7, 2014 due to the headaches showed a normal study. Eventually her Herceptin was stopped on October 17, 2014, and she noticed improvement of the symptom. She had slight headache, but no migraine after discontinuation of the Herceptin. Due to the symptom, we eventually decided to stop the Herceptin and recommended the tamoxifen. I reminded her to have regular Pap smears while she is on tamoxifen. CT abdomen and pelvis in January 2015 showed a normal study. No focal bony abnormalities were seen. She refused to have a bone scan. She could have fibromyalgia. Blood test in February 2015 showed normal CBC. CMP was stable, except for the blood sugar of 424. I advised her to watch her blood sugar at home and her diet. CA 27-29 was 12.     She was diagnosed with fibromyalgia. She was in the emergency room for the migraine and given prescriptions for the migraine, butalbital, aspirin, caffeine pill. Mammogram in March 2015 was negative. Blood tests in May 2015 showed normal CBC. CMP was stable. She had right shoulder surgery by Dr. Latosha Ratliff in May 2015. She had MRI of the neck, cervical spine and the shoulder. Last menstruation was on September 17, 2015. Blood test on January 29, 2016, revealed estradiol level less than 20. FSH was 19.1 and LH 13.4. Based on this information, I believe she is not in a postmenopausal state yet; therefore, I recommended she continue with the tamoxifen. I recommended she have yearly Pap smears. I referred her to see GYN for the routine yearly Pap smear. CBC was within normal limits. CMP was stable. Mammogram in March 2016 was negative. She was seen by Dr. Hai Malcolm, who felt a lump to the left breast and reportedly ultrasound of the left breast revealed cyst in her left breast.  She was scheduled to see a cardiologist at the [facility] for the workup of her syncope. Pap smear in March 2016 was reportedly negative. She was seen the cardiologist and reportedly her cardiac cath was good. Blood test in January 2017 revealed normal CBC. CA 27-29 was 19. Reportedly, she had colonoscopy, upper endoscopic study in February 2017 at [facility]. She was found to have small polyps. Mammogram in March or April 2017 at [facility] was negative, according to her. She has been followed by Dr. Rasheed Kate for syncope. Mammogram in September 2019 was benign. Labs in February 2020 were reviewed      Presented on March 15, 2021. She has not yet had Pap smear and she is encouraged to do so. We discussed risks of delay and she verbalized understanding. She has no new signs or symptoms of recurrent disease. She would like to stop tamoxifen at this time. She is willing to continue throughout the prescription she does have.   She had a mammogram October 2020 which was benign. Dr. Rose Herring has released her. However we will refer her back for port removal.  She denies nausea, vomiting, diarrhea. No melena or hematuria. No headache or dizziness. No night sweats or weight loss. We will have CBC CMP today. She will see her PCP tomorrow to monitor A1c. Scotty Khanna PAST MEDICAL HISTORY:  Hypertension since 2008, diabetes since 2001, history of three arthroscopic knee surgeries, and cholecystectomy. Breast cancer. Obesity. Syncope    FAMILY HISTORY:  Mother had breast cancer twice at age 29 and 47 and had likely metastatic disease to the bone and brain. Maternal grandmother had stomach cancer in her early 52's. Maternal grandmother lived to the age of 80. SOCIAL HISTORY:  She denied any history of smoking, alcohol, or illicit drug use. She is  and has one child. ALLERGIES:  Reviewed as per chart. MEDICATIONS:  Reviewed as per chart. LABORATORY STUDIES:   September 5, 2014: White cell 7.9, hemoglobin 12.1, platelet 055. CMP was stable, with glucose 201. I advised her to cut down the carbohydrate consumption. CA 27-29 was 16.1. Review of Systems: \"Per interval history; otherwise 10 point ROS is negative. \"     Vital Signs: There were no vitals taken for this visit. Physical Exam:  CONSTITUTIONAL: awake, alert, cooperative, no apparent distress   EYES:sclera clear and conjunctiva normal  ENT: Normocephalic, without obvious abnormality, atraumatic  NECK: supple, symmetrical  HEMATOLOGIC/LYMPHATIC: no cervical, supraclavicular or axillary lymphadenopathy   LUNGS: no increased work of breathing and clear to auscultation  BREAST:  Healed surgical incision noted to the left breast s/p lumpectomy, increased capillary visible inferior to scar, inferior to axilla.   No palpable lump to either breast.     CARDIOVASCULAR: regular rate and rhythm, normal S1 and S2, no murmur noted  ABDOMEN: normal bowel sounds x 4, soft, non-distended, non-tender, no masses palpated, no hepatosplenomegaly   MUSCULOSKELETAL: full range of motion noted, tone is normal  NEUROLOGIC: awake, alert, oriented to name, place and time. Motor skills grossly intact. SKIN: Normal skin color, texture, turgor and no jaundice. appears intact   EXTREMITIES: no LE edema. No  Cyanosis. Labs:  Hematology:  Lab Results   Component Value Date    WBC 7.8 09/08/2020    RBC 4.44 09/08/2020    HGB 13.1 09/08/2020    HCT 39.3 09/08/2020    MCV 88.5 09/08/2020    MCH 29.5 09/08/2020    MCHC 33.3 09/08/2020    RDW 13.8 09/08/2020     09/08/2020    MPV 9.9 09/08/2020    BANDSPCT 1 (L) 07/17/2017    SEGSPCT 46.6 09/08/2020    EOSRELPCT 3.4 (H) 09/08/2020    BASOPCT 0.8 09/08/2020    LYMPHOPCT 41.5 09/08/2020    MONOPCT 7.7 (H) 09/08/2020    BANDABS 0.10 07/17/2017    SEGSABS 3.7 09/08/2020    EOSABS 0.3 09/08/2020    BASOSABS 0.1 09/08/2020    LYMPHSABS 3.3 09/08/2020    MONOSABS 0.6 09/08/2020    DIFFTYPE AUTOMATED DIFFERENTIAL 09/08/2020    PLTM FEW PLT CLUMPS NOTED 09/10/2015     Chemistry:  Lab Results   Component Value Date     09/08/2020    K 4.2 09/08/2020     09/08/2020    CO2 28 09/08/2020    BUN 18 09/08/2020    CREATININE 0.6 09/08/2020    GLUCOSE 223 (H) 09/08/2020    CALCIUM 9.2 09/08/2020    PROT 6.4 09/08/2020    LABALBU 3.9 09/08/2020    BILITOT 0.2 09/08/2020    ALKPHOS 94 09/08/2020    AST 11 (L) 09/08/2020    ALT 12 09/08/2020    LABGLOM >60 09/08/2020    GFRAA >60 09/08/2020    POCGLU 210 (H) 11/17/2016     Immunology:  Lab Results   Component Value Date    PROT 6.4 09/08/2020     Coagulation Panel:  Lab Results   Component Value Date    PROTIME 10.8 09/13/2013    INR 1.00 09/13/2013    APTT 24.7 09/13/2013    DDIMER 407 (H) 09/13/2013     Tumor Markers:  Lab Results   Component Value Date    LABCA2 16.6 02/19/2019      Observations:  No data recorded        Assessment & Plan:    1.  She had a history of stage IA triple-positive invasive ductal carcinoma of the left breast, status post lumpectomy in July 2013. She received adjuvant chemotherapy. She completed her radiation therapy on May 16, 2014 and was placed on Tamoxifen. Echocardiogram in July 2014 showed EF of 50 percent. I reminded her to have followup Pap smear with Dr. Nilda Pino. Reportedly, her mammogram in March 2017 was negative. Mammogram in March 2018 was benign. Labs in February 2020 were reviewed and stable. Scheduled for mammogram in September 2020. Likely she has been in remission. She would like to stop tamoxifen which she has been taking for more than 5 years. She has not had a Pap smear and she is encouraged to do so. We will refer to Dr. Argelia Louis for port removal.  Reviewed signs and symptoms of recurrent breast cancer. She is instructed to report to the office with any new signs or symptoms. She verbalized understanding. 2.   She had upper and lower endoscopic study in February of 2017. Reportedly, small polyps from colon were removed. Repeat in 5 years. 3.   I discussed with her about weight loss, diet, and exercise. She will call Dr Ivan Marion for Pap smear. RTC  in six months or sooner. All of her questions have been answered for today. Recent imaging and labs were reviewed and discussed with the patient. Return in about 1 year (around 3/15/2022). FK  Patient was instructed to stop at our  to make their next appointment before leaving. They will call in the interim with any questions/concerns/new symptoms. This plan was discussed with the patient and they verbalized understanding and acceptance of the plan. Yulia Eng  was used in consultation for this visit. I have recommended that the patient follow CDC guidelines for prevention of COVID-19 infection.

## 2021-03-15 NOTE — PROGRESS NOTES
MA Rooming Questions  Patient: Franny Burgos  MRN: [id number]    Date: 3/15/2021        1. Do you have any new issues?   no         2. Do you need any refills on medications?    no    3. Have you had any imaging done since your last visit?   no    4. Have you been hospitalized or seen in the emergency room since your last visit here?   no    5. Did the patient have a depression screening completed today?  Yes    PHQ-9 Total Score: 0 (3/15/2021  9:16 AM)       PHQ-9 Given to (if applicable):               PHQ-9 Score (if applicable):                     [] Positive     []  Negative              Does question #9 need addressed (if applicable)                     [] Yes    []  No               Tess Lyons MA

## 2021-09-27 DIAGNOSIS — Z17.0 MALIGNANT NEOPLASM OF UPPER-INNER QUADRANT OF LEFT BREAST IN FEMALE, ESTROGEN RECEPTOR POSITIVE (HCC): Primary | ICD-10-CM

## 2021-09-27 DIAGNOSIS — N64.4 BREAST PAIN, RIGHT: ICD-10-CM

## 2021-09-27 DIAGNOSIS — C50.212 MALIGNANT NEOPLASM OF UPPER-INNER QUADRANT OF LEFT BREAST IN FEMALE, ESTROGEN RECEPTOR POSITIVE (HCC): Primary | ICD-10-CM

## 2021-09-27 NOTE — PROGRESS NOTES
Order for bilateral diagnostic mammogram with ultrasound faxed to [facility].

## 2022-03-03 ENCOUNTER — OFFICE VISIT (OUTPATIENT)
Dept: GASTROENTEROLOGY | Age: 62
End: 2022-03-03
Payer: COMMERCIAL

## 2022-03-03 VITALS
TEMPERATURE: 97.3 F | DIASTOLIC BLOOD PRESSURE: 68 MMHG | OXYGEN SATURATION: 91 % | HEART RATE: 91 BPM | BODY MASS INDEX: 36.85 KG/M2 | HEIGHT: 70 IN | SYSTOLIC BLOOD PRESSURE: 126 MMHG | WEIGHT: 257.4 LBS

## 2022-03-03 DIAGNOSIS — Z86.010 HISTORY OF COLON POLYPS: ICD-10-CM

## 2022-03-03 DIAGNOSIS — R14.0 BLOATING: ICD-10-CM

## 2022-03-03 DIAGNOSIS — R11.0 NAUSEA: Primary | ICD-10-CM

## 2022-03-03 DIAGNOSIS — K21.9 GASTROESOPHAGEAL REFLUX DISEASE, UNSPECIFIED WHETHER ESOPHAGITIS PRESENT: ICD-10-CM

## 2022-03-03 PROCEDURE — 99204 OFFICE O/P NEW MOD 45 MIN: CPT | Performed by: NURSE PRACTITIONER

## 2022-03-03 RX ORDER — POLYETHYLENE GLYCOL 3350, SODIUM SULFATE, SODIUM CHLORIDE, POTASSIUM CHLORIDE, ASCORBIC ACID, SODIUM ASCORBATE 140-9-5.2G
1 KIT ORAL ONCE
Qty: 1 EACH | Refills: 0 | Status: SHIPPED | OUTPATIENT
Start: 2022-03-03 | End: 2022-03-03

## 2022-03-03 RX ORDER — EZETIMIBE 10 MG/1
TABLET ORAL
COMMUNITY
Start: 2022-02-15

## 2022-03-03 RX ORDER — SODIUM, POTASSIUM,MAG SULFATES 17.5-3.13G
1 SOLUTION, RECONSTITUTED, ORAL ORAL DAILY
Qty: 1 EACH | Refills: 0 | Status: SHIPPED | OUTPATIENT
Start: 2022-03-03 | End: 2022-03-06 | Stop reason: ALTCHOICE

## 2022-03-03 RX ORDER — EMPAGLIFLOZIN 25 MG/1
TABLET, FILM COATED ORAL
COMMUNITY
Start: 2022-02-10

## 2022-03-03 RX ORDER — ONDANSETRON 4 MG/1
4 TABLET, FILM COATED ORAL DAILY PRN
Qty: 30 TABLET | Refills: 2 | Status: SHIPPED | OUTPATIENT
Start: 2022-03-03

## 2022-03-03 RX ORDER — OMEPRAZOLE 40 MG/1
CAPSULE, DELAYED RELEASE ORAL
Status: ON HOLD | COMMUNITY
Start: 2021-12-10 | End: 2022-08-31 | Stop reason: HOSPADM

## 2022-03-03 RX ORDER — ROSUVASTATIN CALCIUM 20 MG/1
TABLET, COATED ORAL
COMMUNITY
Start: 2022-03-02

## 2022-03-03 RX ORDER — BUSPIRONE HYDROCHLORIDE 15 MG/1
TABLET ORAL
Status: ON HOLD | COMMUNITY
Start: 2022-02-17 | End: 2022-03-29 | Stop reason: SDUPTHER

## 2022-03-03 RX ORDER — SIMETHICONE 80 MG
80 TABLET,CHEWABLE ORAL ONCE
Qty: 3 TABLET | Refills: 0 | Status: SHIPPED | OUTPATIENT
Start: 2022-03-03 | End: 2022-04-07 | Stop reason: ALTCHOICE

## 2022-03-03 ASSESSMENT — ENCOUNTER SYMPTOMS
DIARRHEA: 0
NAUSEA: 1
EYE PAIN: 0
COLOR CHANGE: 0
ABDOMINAL PAIN: 0
BLOOD IN STOOL: 0
COUGH: 0
BACK PAIN: 1
CONSTIPATION: 0
WHEEZING: 0
SHORTNESS OF BREATH: 0
VOMITING: 0
PHOTOPHOBIA: 0

## 2022-03-03 NOTE — PATIENT INSTRUCTIONS
Patient Education        Upper GI Endoscopy: Before Your Procedure  What is an upper GI endoscopy? An upper gastrointestinal (or GI) endoscopy is a test that allows your doctor to look at the inside of your esophagus, stomach, and the first part of your small intestine, called the duodenum. The esophagus is the tube that carries food to your stomach. The doctor uses a thin, lighted tube that bends. It is called an endoscope, or scope. The doctor puts the tip of the scope in your mouth and gently moves it down your throat. The scope is a flexible video camera. The doctor looks at a monitor (like a TV set or a computer screen) as he or she moves the scope. A doctor may do this procedure to look for ulcers, tumors, infection, or bleeding. It also can be used to look for signs of acid backing up into your esophagus. This is called gastroesophageal reflux disease, or GERD. The doctor can use the scope to take a sample of tissue for study (a biopsy). The doctor also can use the scope to take out growths or stop bleeding. Follow-up care is a key part of your treatment and safety. Be sure to make and go to all appointments, and call your doctor if you are having problems. It's also a good idea to know your test results and keep a list of the medicines you take. How do you prepare for the procedure? Procedures can be stressful. This information will help you understand what you can expect. And it will help you safely prepare for your procedure. Preparing for the procedure    · Do not eat or drink anything for 6 to 8 hours before the test. An empty stomach helps your doctor see your stomach clearly during the test. It also reduces your chances of vomiting. If you vomit, there is a small risk that the vomit could enter your lungs.  (This is called aspiration.) If the test is done in an emergency, a tube may be inserted through your nose or mouth to empty your stomach.     · Do not take sucralfate (Carafate) or antacids on the day of the test. These medicines can make it hard for your doctor to see your upper GI tract.     · If your doctor tells you to, stop taking iron supplements 7 to 14 days before the test.     · Be sure you have someone to take you home. Anesthesia and pain medicine will make it unsafe for you to drive or get home on your own.     · Understand exactly what procedure is planned, along with the risks, benefits, and other options. · Tell your doctor ALL the medicines, vitamins, supplements, and herbal remedies you take. Some may increase the risk of problems during your procedure. Your doctor will tell you if you should stop taking any of them before the procedure and how soon to do it.     · If you take aspirin or some other blood thinner, ask your doctor if you should stop taking it before your procedure. Make sure that you understand exactly what your doctor wants you to do. These medicines increase the risk of bleeding.     · Make sure your doctor and the hospital have a copy of your advance directive. If you don't have one, you may want to prepare one. It lets others know your health care wishes. It's a good thing to have before any type of surgery or procedure. What happens on the day of the procedure? · Follow the instructions exactly about when to stop eating and drinking. If you don't, your procedure may be canceled. If your doctor told you to take your medicines on the day of the procedure, take them with only a sip of water.     · Take a bath or shower before you come in for your procedure. Do not apply lotions, perfumes, deodorants, or nail polish.     · Take off all jewelry and piercings. And take out contact lenses, if you wear them. At the hospital or surgery center   · Bring a picture ID.     · The test may take 15 to 30 minutes.     · The doctor may spray medicine on the back of your throat to numb it.  You also will get medicine to prevent pain and to relax you.     · You will lie on your left side. The doctor will put the scope in your mouth and toward the back of your throat. The doctor will tell you when to swallow. This helps the scope move down your throat. You will be able to breathe normally. The doctor will move the scope down your esophagus into your stomach. The doctor also may look at the duodenum.     · If your doctor wants to take a sample of tissue for a biopsy, he or she may use small surgical tools, which are put into the scope, to cut off some tissue. You will not feel a biopsy, if one is taken. The doctor also can use the tools to stop bleeding or to do other treatments, if needed.     · You will stay at the hospital or surgery center for 1 to 2 hours until the medicine you were given wears off. What happens after an upper GI endoscopy? · After the test, you may belch and feel bloated for a while.     · You may have a tickling, dry throat or mouth. You may feel a bit hoarse, and you may have a mild sore throat. These symptoms may last several days. Throat lozenges and warm saltwater gargles can help relieve the throat symptoms.     · Ask your doctor when you can drive again.     · Your doctor will tell you when you can go back to your usual diet and activities.     · Don't drink alcohol for 12 to 24 hours after the test.   When should you call your doctor? · You have questions or concerns.     · You don't understand how to prepare for your procedure.     · You become ill before the procedure (such as fever, flu, or a cold).     · You need to reschedule or have changed your mind about having the procedure. Where can you learn more? Go to [email or web address].[email or web address]. org and sign in to your "[employer] account. Enter P790 in the [employer] box to learn more about \"Upper GI Endoscopy: Before Your Procedure. \"     If you do not have an account, please click on the \"Sign Up Now\" link.   Current as of: September 8, 2021               Content Version: 13.1  © 8138-4253 [employer]. Care instructions adapted under license by [facility] ([facility]). If you have questions about a medical condition or this instruction, always ask your healthcare professional. [address] any warranty or liability for your use of this information.

## 2022-03-03 NOTE — PROGRESS NOTES
Socrates Wing 63 y.o. female was seen by MOHAN Martinez on 3/3/2022     Wt Readings from Last 3 Encounters:   03/03/22 257 lb 6.4 oz (116.8 kg)   03/31/21 260 lb (117.9 kg)   03/15/21 262 lb 3.2 oz (118.9 kg)       [facility]  Socrates Wing is a pleasant 63 y.o.  female who presents today for acid reflux, history of colon polyps and nausea. She has a past medical history of asthma, cancer, diabetes mellitus, fibromyalgia, hypertension, neurocardiogenic syncope, and type II or unspecified type diabetes mellitus without mention of complication, not stated as uncontrolled. Her last EGD/colonoscopy were done five years ago in [place] with colon polyps removed. Her appetite is poor without early satiety. Her weight is stable. No abdominal pain. She had bloating on most days. In the last two weeks having nausea with vomited undigested food with last episode yesterday. Her heartburn and acid reflux is controlled with Prilosec. No nocturnal awakenings with acid reflux. No dysphagia or pain with swallowing. She has excess belching. No excess flatulence. She denies changes in her bowel pattern. Her typical bowel pattern is every other day with soft brown formed stools. No diarrhea or constipation. No blood in her stools or melena. No family history of stomach or colon cancer. ROS  Review of Systems   Constitutional: Positive for appetite change, fatigue and unexpected weight change. Negative for chills, diaphoresis and fever. HENT: Positive for tinnitus. Negative for ear pain and hearing loss. Eyes: Negative for photophobia, pain and visual disturbance. Respiratory: Negative for cough, shortness of breath and wheezing. Cardiovascular: Negative for chest pain, palpitations and leg swelling. Gastrointestinal: Positive for nausea. Negative for abdominal pain, blood in stool, constipation, diarrhea and vomiting.    Endocrine: Negative for cold intolerance, heat intolerance and polydipsia. Genitourinary: Negative for dysuria, frequency and urgency. Musculoskeletal: Positive for back pain. Negative for myalgias and neck pain. Fibromyaglia   Skin: Negative for color change, pallor and rash. Allergic/Immunologic: Negative for environmental allergies and food allergies. Neurological: Positive for dizziness. Negative for seizures, weakness and headaches. Hematological: Does not bruise/bleed easily. Psychiatric/Behavioral: Positive for sleep disturbance. Negative for dysphoric mood and suicidal ideas. The patient is not nervous/anxious.         Allergies  Allergies   Allergen Reactions    Tape Vincent Shires Tape]        Medications  Current Outpatient Medications   Medication Sig Dispense Refill    busPIRone (BUSPAR) 15 MG tablet TAKE 1 TABLET BY MOUTH TWICE DAILY      JARDIANCE 25 MG tablet TAKE 1 TABLET BY MOUTH ONCE DAILY IN THE MORNING      ezetimibe (ZETIA) 10 MG tablet TAKE 1 TABLET BY MOUTH ONCE DAILY FOR 90 DAYS      omeprazole (PRILOSEC) 40 MG delayed release capsule TAKE 1 CAPSULE BY MOUTH ONCE DAILY BEFORE A MEAL      rosuvastatin (CRESTOR) 20 MG tablet       Na Sulfate-K Sulfate-Mg Sulf (SUPREP BOWEL PREP KIT) 17.5-3.13-1.6 GM/177ML SOLN solution Take 1 kit by mouth daily 1 each 0    ondansetron (ZOFRAN) 4 MG tablet Take 1 tablet by mouth daily as needed for Nausea or Vomiting 30 tablet 2    simethicone (MYLICON) 80 MG chewable tablet Take 1 tablet by mouth once for 1 dose 3 tablet 0    Icosapent Ethyl (VASCEPA PO) Take 2 tablets by mouth 2 times daily Takes 10mg - taking 2 tabs bid      pioglitazone (ACTOS) 15 MG tablet       escitalopram (LEXAPRO) 20 MG tablet Take 20 mg by mouth      b complex vitamins capsule Take 1 capsule by mouth daily      melatonin 3 MG TABS tablet Take 3 mg by mouth nightly      gabapentin (NEURONTIN) 600 MG tablet 600 mg 2 times daily       metFORMIN ER (GLUCOPHAGE-XR) 500 MG XR tablet 500 mg nightly       aspirin 81 MG tablet Take 81 mg by mouth daily      butalbital-acetaminophen-caffeine (FIORICET) -40 MG per tablet Take 1 tablet by mouth every 4 hours as needed for Pain 60 tablet 0    pantoprazole (PROTONIX) 40 MG tablet Take 40 mg by mouth daily       LEVEMIR FLEXTOUCH 100 UNIT/ML injection pen Inject 70 Units into the skin daily       NOVOFINE 32G X 6 MM MISC       ONE TOUCH ULTRA TEST strip       lidocaine-prilocaine (EMLA) 2.5-2.5 % cream Apply topically AS NEED (Patient not taking: Reported on 3/3/2022) 1 Tube 0     No current facility-administered medications for this visit. Past medical history:   She has a past medical history of Asthma, Cancer ([facility] [address] Diabetes mellitus ([facility] [address] Fibromyalgia, Hypertension, Neurocardiogenic syncope, and Type II or unspecified type diabetes mellitus without mention of complication, not stated as uncontrolled. Past surgical history:  She has a past surgical history that includes Cholecystectomy; Breast surgery; Knee Arthroplasty; Rotator cuff repair; and Insertable Cardiac Monitor. Social History:  She reports that she has never smoked. She has never used smokeless tobacco. She reports that she does not drink alcohol. Family history:  Her family history is not on file. Objective    Vitals:    03/03/22 1446   BP: 126/68   Pulse: 91   Temp: 97.3 °F (36.3 °C)   SpO2: 91%        Physical exam    Physical Exam  Vitals reviewed. Constitutional:       General: She is not in acute distress. Appearance: She is well-developed. She is obese. She is not ill-appearing, toxic-appearing or diaphoretic. HENT:      Head: Normocephalic and atraumatic. Nose: Nose normal.      Mouth/Throat:      Mouth: Mucous membranes are moist.   Eyes:      General:         Right eye: No discharge. Left eye: No discharge. Pupils: Pupils are equal, round, and reactive to light. Neck:      Trachea: No tracheal deviation.    Cardiovascular:      Rate and Rhythm: Normal rate and regular rhythm. Pulses: Normal pulses. Heart sounds: Normal heart sounds. No murmur heard. No gallop. Pulmonary:      Effort: Pulmonary effort is normal. No respiratory distress. Breath sounds: Normal breath sounds. No stridor. No wheezing, rhonchi or rales. Abdominal:      General: Bowel sounds are normal. There is no distension. Palpations: Abdomen is soft. There is no mass. Tenderness: There is no abdominal tenderness. There is no guarding or rebound. Hernia: No hernia is present. Musculoskeletal:         General: Normal range of motion. Cervical back: Normal range of motion and neck supple. Skin:     General: Skin is warm and dry. Neurological:      Mental Status: She is alert and oriented to person, place, and time. Psychiatric:         Mood and Affect: Mood normal.         No visits with results within 2 Month(s) from this visit.    Latest known visit with results is:   Hospital Outpatient Visit on 03/15/2021   Component Date Value Ref Range Status    WBC 03/15/2021 8.4  4.0 - 10.5 K/CU MM Final    RBC 03/15/2021 4.82  4.2 - 5.4 M/CU MM Final    Hemoglobin 03/15/2021 13.9  12.5 - 16.0 GM/DL Final    Hematocrit 03/15/2021 41.7  37 - 47 % Final    MCV 03/15/2021 86.5  78 - 100 FL Final    MCH 03/15/2021 28.8  27 - 31 PG Final    MCHC 03/15/2021 33.3  32.0 - 36.0 % Final    RDW 03/15/2021 14.3  11.7 - 14.9 % Final    Platelets 99/52/9670 303  140 - 440 K/CU MM Final    MPV 03/15/2021 9.8  7.5 - 11.1 FL Final    Differential Type 03/15/2021 AUTOMATED DIFFERENTIAL   Final    Segs Relative 03/15/2021 45.6  36 - 66 % Final    Lymphocytes % 03/15/2021 44.0  24 - 44 % Final    Monocytes % 03/15/2021 7.1* 0 - 4 % Final    Eosinophils % 03/15/2021 2.6  0 - 3 % Final    Basophils % 03/15/2021 0.7  0 - 1 % Final    Segs Absolute 03/15/2021 3.8  K/CU MM Final    Lymphocytes Absolute 03/15/2021 3.7  K/CU MM Final    Monocytes Absolute 03/15/2021 0.6  K/CU MM Final    Eosinophils Absolute 03/15/2021 0.2  K/CU MM Final    Basophils Absolute 03/15/2021 0.1  K/CU MM Final    Sodium 03/15/2021 139  135 - 145 MMOL/L Final    Potassium 03/15/2021 4.0  3.5 - 5.1 MMOL/L Final    Chloride 03/15/2021 100  99 - 110 mMol/L Final    CO2 03/15/2021 31  21 - 32 MMOL/L Final    BUN 03/15/2021 16  6 - 23 MG/DL Final    CREATININE 03/15/2021 0.7  0.6 - 1.1 MG/DL Final    Glucose 03/15/2021 133* 70 - 99 MG/DL Final    Calcium 03/15/2021 9.0  8.3 - 10.6 MG/DL Final    Albumin 03/15/2021 3.8  3.4 - 5.0 GM/DL Final    Total Protein 03/15/2021 6.6  6.4 - 8.2 GM/DL Final    Total Bilirubin 03/15/2021 0.1  0.0 - 1.0 MG/DL Final    ALT 03/15/2021 11  10 - 40 U/L Final    AST 03/15/2021 12* 15 - 37 IU/L Final    Alkaline Phosphatase 03/15/2021 81  40 - 129 IU/L Final    GFR Non- 03/15/2021 >60  >60 mL/min/1.73m2 Final    GFR  03/15/2021 >60  >60 mL/min/1.73m2 Final    Anion Gap 03/15/2021 8  4 - 16 Final       Assessment:   1. Nausea most likely medication related ? Zetia  2. Abdominal bloating may be secondary to diet related abdominal bloating, gastritis, small bowel bacterial overgrowth (SIBO). 3.  GERD without dysphagia or odynophagia  4. History of colon polyps      Plan:  1. The patient was encouraged to continue taking Prilosec daily for treatment of acid reflux. Avoid NSAID and diet modification  2. The patient was encouraged to continue taking antiemetics as needed for nausea. 3.  Will plan for a EGD/colonoscopy with MAC anesthesia. The patient was informed of the risks and benefits of the procedures. 4.  Further recommendations for follow-up will be determined after the EGD/colonoscopy have been completed.

## 2022-03-07 ENCOUNTER — PREP FOR PROCEDURE (OUTPATIENT)
Dept: GASTROENTEROLOGY | Age: 62
End: 2022-03-07

## 2022-03-07 ENCOUNTER — TELEPHONE (OUTPATIENT)
Dept: GASTROENTEROLOGY | Age: 62
End: 2022-03-07

## 2022-03-07 RX ORDER — SODIUM CHLORIDE 9 MG/ML
25 INJECTION, SOLUTION INTRAVENOUS PRN
Status: CANCELLED | OUTPATIENT
Start: 2022-03-07

## 2022-03-07 RX ORDER — SODIUM CHLORIDE, SODIUM LACTATE, POTASSIUM CHLORIDE, CALCIUM CHLORIDE 600; 310; 30; 20 MG/100ML; MG/100ML; MG/100ML; MG/100ML
INJECTION, SOLUTION INTRAVENOUS CONTINUOUS
Status: CANCELLED | OUTPATIENT
Start: 2022-03-07

## 2022-03-07 RX ORDER — SODIUM CHLORIDE 0.9 % (FLUSH) 0.9 %
5-40 SYRINGE (ML) INJECTION PRN
Status: CANCELLED | OUTPATIENT
Start: 2022-03-07

## 2022-03-07 RX ORDER — SODIUM CHLORIDE 0.9 % (FLUSH) 0.9 %
5-40 SYRINGE (ML) INJECTION EVERY 12 HOURS SCHEDULED
Status: CANCELLED | OUTPATIENT
Start: 2022-03-07

## 2022-03-22 NOTE — PROGRESS NOTES
Patient will arrive at [phone] at [facility] on 3/29/2022 for her procedure at [phone].  1. Do not eat or drink any thing after 12 midnight (or____hours) unless instructed by your doctor prior to surgery. This includes no water, chewing gum or mints. NO chewing tobacco.  2. Follow your directions as prescribed by the doctor for your procedure and medications. 3.Check with your Doctor regarding stopping Plavix, Coumadin, Lovenox,Effient,Pradaxa,Xarelto, Fragmin or other blood thinners and follow their instructions. 4. Do not smoke, and do not drink any alcoholic beverages 24 hours prior to surgery. This includes NA Beer. 5. You may brush your teeth and gargle the morning of surgery. DO NOT SWALLOW WATER  6. You MUST make arrangements for a responsible adult to take you home after your surgery and be able to check on you every couple hours for the day. You will not be allowed     to leave alone or drive yourself home. It is strongly suggested someone stay with you the first 24 hrs. Your surgery will be cancelled if you do not have a ride home. 7. Please wear simple, loose fitting clothing to the hospital.  Yousif Herb not bring valuables (money, credit cards, checkbooks, etc.) Do not wear any makeup (including no eye makeup) or nail polish on your fingers or toes. 8. DO NOT wear any jewelry or piercings on day of surgery. All body piercing jewelry must be removed  9. If you have dentures, they will be removed before going to the OR; we will provide you a container. If you wear contact lenses or glasses, they will be removed; please bring a case for them. 10. If you  have a Living Will and Durable Power of  for Healthcare, please bring in a copy. 11 Please bring picture ID,  insurance card, paperwork from the doctors office    (H & P, Consent, & card for implantable devices). Patient will take her buspar, lexapro, gabapentin, metformin and prilosec the morning of her procedure.  Patient will call Dr Anamika Meyers office about levemir dose the night before her procedure. Explained to patient that on the morning of 3/28/2022, she can have milk products, such as pudding or ice cream for breakfast and then just clear liquids. Will have Mona have anesthesia review patient's chart, to see if she [facility]. Addendum. Okay for [facility] to be [facility], per Dr Shruti Ackerman.

## 2022-03-27 NOTE — H&P
Original H &P in soft chart. I have examined the patient immediately before the procedure and there is no change in the previous history and physical exam, which has been reviewed. There is no history of sleep apnea, snoring, or stridor. There has been no  previous adverse experience with sedation/anesthesia. There is no increased risk for aspiration of gastric contents. The patient has been instructed that all resuscitative measures (during the operative and immediate perioperative period) will be instituted in the unlikely event that they will be needed. The patient has no pertinent past surgical or family history other than listed in the original H&P. The patient was counseled about the risks of elvira Covid-19 during their perioperative period and any recovery window from their procedure. The patient was made aware that elvira Covid-19  may worsen their prognosis for recovering from their procedure  and lend to a higher morbidity and/or mortality risk. All material risks, benefits, and reasonable alternatives including postponing the procedure were discussed. The patient does wish to proceed with the procedure at this time.     ASA Class: 2  AIRWAY Class: 1

## 2022-03-28 ENCOUNTER — ANESTHESIA EVENT (OUTPATIENT)
Dept: OPERATING ROOM | Age: 62
End: 2022-03-28
Payer: COMMERCIAL

## 2022-03-28 NOTE — ANESTHESIA PRE PROCEDURE
Department of Anesthesiology  Preprocedure Note       Name:  Paco Allen   Age:  63 y.o.  :  1960                                          MRN:  [id number]         Date:  3/28/2022      Surgeon: Roberto Wilson):  Justin Irwin MD    Procedure: Procedure(s):  COLONOSCOPY DIAGNOSTIC  EGD ESOPHAGOGASTRODUODENOSCOPY    Medications prior to admission:   Prior to Admission medications    Medication Sig Start Date End Date Taking?  Authorizing Provider   busPIRone (BUSPAR) 15 MG tablet TAKE 1 TABLET BY MOUTH TWICE DAILY 22   Historical Provider, MD   JARDIANCE 25 MG tablet TAKE 1 TABLET BY MOUTH ONCE DAILY IN THE MORNING 2/10/22   Historical Provider, MD   ezetimibe (ZETIA) 10 MG tablet TAKE 1 TABLET BY MOUTH ONCE DAILY FOR 90 DAYS 2/15/22   Historical Provider, MD   omeprazole (PRILOSEC) 40 MG delayed release capsule TAKE 1 CAPSULE BY MOUTH ONCE DAILY BEFORE A MEAL 12/10/21   Historical Provider, MD   rosuvastatin (CRESTOR) 20 MG tablet  3/2/22   Historical Provider, MD   ondansetron (ZOFRAN) 4 MG tablet Take 1 tablet by mouth daily as needed for Nausea or Vomiting 3/3/22   EDITA Nash CNP   simethicone (MYLICON) 80 MG chewable tablet Take 1 tablet by mouth once for 1 dose 3/3/22 3/3/22  EDITA Nash CNP   lidocaine-prilocaine (EMLA) 2.5-2.5 % cream Apply topically AS NEED  Patient not taking: Reported on 3/3/2022 3/31/21   Eleanor Lorenzo MD   Icosapent Ethyl (VASCEPA PO) Take 2 tablets by mouth 2 times daily Takes 10mg - taking 2 tabs bid    Historical Provider, MD   pioglitazone (ACTOS) 15 MG tablet  3/27/18   Historical Provider, MD   escitalopram (LEXAPRO) 20 MG tablet Take 20 mg by mouth 8/29/17 3/3/22  Historical Provider, MD   b complex vitamins capsule Take 1 capsule by mouth daily    Historical Provider, MD   melatonin 3 MG TABS tablet Take 3 mg by mouth nightly    Historical Provider, MD   gabapentin (NEURONTIN) 600 MG tablet 600 mg 2 times daily  9/15/15 Historical Provider, MD   metFORMIN ER (GLUCOPHAGE-XR) 500 MG XR tablet 500 mg nightly  8/15/15   Historical Provider, MD   aspirin 81 MG tablet Take 81 mg by mouth daily    Historical Provider, MD   butalbital-acetaminophen-caffeine (FIORICET) -40 MG per tablet Take 1 tablet by mouth every 4 hours as needed for Pain 5/10/15   Denver Butters, PA   pantoprazole (PROTONIX) 40 MG tablet Take 40 mg by mouth daily  3/22/15   Historical Provider, MD   LEVEMIR FLEXTOUCH 100 UNIT/ML injection pen Inject 70 Units into the skin daily  9/4/14   Historical Provider, MD Giovanni Marx 32G X 6 MM [address] [facility]  8/6/14   Historical Provider, MD   [address] TEST strip  8/25/14   Historical Provider, MD       Current medications:    No current facility-administered medications for this encounter.      Current Outpatient Medications   Medication Sig Dispense Refill    busPIRone (BUSPAR) 15 MG tablet TAKE 1 TABLET BY MOUTH TWICE DAILY      JARDIANCE 25 MG tablet TAKE 1 TABLET BY MOUTH ONCE DAILY IN THE MORNING      ezetimibe (ZETIA) 10 MG tablet TAKE 1 TABLET BY MOUTH ONCE DAILY FOR 90 DAYS      omeprazole (PRILOSEC) 40 MG delayed release capsule TAKE 1 CAPSULE BY MOUTH ONCE DAILY BEFORE A MEAL      rosuvastatin (CRESTOR) 20 MG tablet       ondansetron (ZOFRAN) 4 MG tablet Take 1 tablet by mouth daily as needed for Nausea or Vomiting 30 tablet 2    simethicone (MYLICON) 80 MG chewable tablet Take 1 tablet by mouth once for 1 dose 3 tablet 0    lidocaine-prilocaine (EMLA) 2.5-2.5 % cream Apply topically AS NEED (Patient not taking: Reported on 3/3/2022) 1 Tube 0    Icosapent Ethyl (VASCEPA PO) Take 2 tablets by mouth 2 times daily Takes 10mg - taking 2 tabs bid      pioglitazone (ACTOS) 15 MG tablet       escitalopram (LEXAPRO) 20 MG tablet Take 20 mg by mouth      b complex vitamins capsule Take 1 capsule by mouth daily      melatonin 3 MG TABS tablet Take 3 mg by mouth nightly      gabapentin (NEURONTIN) 600 MG tablet 600 mg 2 times daily       metFORMIN ER (GLUCOPHAGE-XR) 500 MG XR tablet 500 mg nightly       aspirin 81 MG tablet Take 81 mg by mouth daily      butalbital-acetaminophen-caffeine (FIORICET) -40 MG per tablet Take 1 tablet by mouth every 4 hours as needed for Pain 60 tablet 0    pantoprazole (PROTONIX) 40 MG tablet Take 40 mg by mouth daily       LEVEMIR FLEXTOUCH 100 UNIT/ML injection pen Inject 70 Units into the skin daily       NOVOFINE 32G X 6 MM MISC       ONE TOUCH ULTRA TEST strip          Allergies: Allergies   Allergen Reactions    Tape Camilla Bicker Tape]        Problem List:    Patient Active Problem List   Diagnosis Code    Malignant neoplasm of upper-inner quadrant of left female breast ([facility] [address] C50.212       Past Medical History:        Diagnosis Date    Asthma     Cancer ([facility] [address]     left breast, precancerous on right breast.    Diabetes mellitus ([facility] [address]     Fibromyalgia     History of loop recorder     Hyperlipidemia     Hypertension     Neurocardiogenic syncope     Type II or unspecified type diabetes mellitus without mention of complication, not stated as uncontrolled        Past Surgical History:        Procedure Laterality Date    BREAST SURGERY Left     lumpectomy, right breast biopsy. lymph nodes removed    CARDIAC CATHETERIZATION      no stents.   SECTION      CHOLECYSTECTOMY      COLONOSCOPY      ELBOW SURGERY Right     biceps    ENDOSCOPY, COLON, DIAGNOSTIC      INSERTABLE CARDIAC MONITOR      KNEE ARTHROSCOPY Bilateral     PORT SURGERY      port placed and removed.  ROTATOR CUFF REPAIR Right        Social History:    Social History     Tobacco Use    Smoking status: Never Smoker    Smokeless tobacco: Never Used   Substance Use Topics    Alcohol use:  No                                Counseling given: Not Answered      Vital Signs (Current):   Vitals:    22 1123   Weight: 253 lb (114.8 kg)   Height: 5' 10\" (1.778 m) BP Readings from Last 3 Encounters:   03/03/22 126/68   03/15/21 127/69   09/14/20 129/86       NPO Status:                                                                                 BMI:   Wt Readings from Last 3 Encounters:   03/03/22 257 lb 6.4 oz (116.8 kg)   03/31/21 260 lb (117.9 kg)   03/15/21 262 lb 3.2 oz (118.9 kg)     Body mass index is 36.3 kg/m². CBC:   Lab Results   Component Value Date    WBC 8.4 03/15/2021    RBC 4.82 03/15/2021    HGB 13.9 03/15/2021    HCT 41.7 03/15/2021    MCV 86.5 03/15/2021    RDW 14.3 03/15/2021     03/15/2021       CMP:   Lab Results   Component Value Date     03/15/2021    K 4.0 03/15/2021     03/15/2021    CO2 31 03/15/2021    BUN 16 03/15/2021    CREATININE 0.7 03/15/2021    GFRAA >60 03/15/2021    LABGLOM >60 03/15/2021    GLUCOSE 133 03/15/2021    PROT 6.6 03/15/2021    CALCIUM 9.0 03/15/2021    BILITOT 0.1 03/15/2021    ALKPHOS 81 03/15/2021    AST 12 03/15/2021    ALT 11 03/15/2021       POC Tests: No results for input(s): POCGLU, POCNA, POCK, POCCL, POCBUN, POCHEMO, POCHCT in the last 72 hours.     Coags:   Lab Results   Component Value Date    PROTIME 10.8 09/13/2013    INR 1.00 09/13/2013    APTT 24.7 09/13/2013       HCG (If Applicable): No results found for: PREGTESTUR, PREGSERUM, HCG, HCGQUANT     ABGs: No results found for: PHART, PO2ART, MVJ2PUO, NHM8XCO, BEART, B3EIDEFB     Type & Screen (If Applicable):  No results found for: LABABO, LABRH    Drug/Infectious Status (If Applicable):  No results found for: HIV, HEPCAB    COVID-19 Screening (If Applicable): No results found for: COVID19        Anesthesia Evaluation  Patient summary reviewed  Airway: Mallampati: II  TM distance: >3 FB   Neck ROM: full  Mouth opening: > = 3 FB Dental: normal exam         Pulmonary:normal exam    (+) asthma:                            Cardiovascular:  Exercise tolerance: good (>4 METS),   (+) hypertension:,       ECG reviewed ROS comment: Echo 3/2017    Summary   LV function is normal , EF 45-50%. Normal left ventricular wall thickness. Diastolic Dysfunction Grade I. No regional wall motion abnormalities   All chamber dimensions are within normal limits. No significant valvular disease noted. Neuro/Psych:                ROS comment: Syncopal events, several a month- loop recorder  GI/Hepatic/Renal:   (+) bowel prep,          ROS comment: N/V. Endo/Other:    (+) Diabetes, malignancy/cancer. Abdominal:             Vascular: Other Findings:           Anesthesia Plan      MAC     ASA 3     (Chart review)  Induction: intravenous. Anesthetic plan and risks discussed with patient. Plan discussed with CRNA.                 EDITA Soto - CRNA   3/28/2022

## 2022-03-29 ENCOUNTER — ANESTHESIA (OUTPATIENT)
Dept: OPERATING ROOM | Age: 62
End: 2022-03-29
Payer: COMMERCIAL

## 2022-03-29 ENCOUNTER — HOSPITAL ENCOUNTER (OUTPATIENT)
Age: 62
Setting detail: OUTPATIENT SURGERY
Discharge: HOME | End: 2022-03-29
Attending: SPECIALIST | Admitting: SPECIALIST
Payer: COMMERCIAL

## 2022-03-29 VITALS
DIASTOLIC BLOOD PRESSURE: 66 MMHG | HEIGHT: 70 IN | BODY MASS INDEX: 36.22 KG/M2 | OXYGEN SATURATION: 96 % | SYSTOLIC BLOOD PRESSURE: 93 MMHG | HEART RATE: 95 BPM | TEMPERATURE: 96.5 F | RESPIRATION RATE: 16 BRPM | WEIGHT: 253 LBS

## 2022-03-29 VITALS — DIASTOLIC BLOOD PRESSURE: 67 MMHG | SYSTOLIC BLOOD PRESSURE: 143 MMHG | OXYGEN SATURATION: 96 %

## 2022-03-29 DIAGNOSIS — K21.9 GASTROESOPHAGEAL REFLUX DISEASE, UNSPECIFIED WHETHER ESOPHAGITIS PRESENT: ICD-10-CM

## 2022-03-29 DIAGNOSIS — R11.0 NAUSEA: ICD-10-CM

## 2022-03-29 DIAGNOSIS — R14.0 BLOATING: ICD-10-CM

## 2022-03-29 DIAGNOSIS — Z86.010 HISTORY OF COLON POLYPS: ICD-10-CM

## 2022-03-29 LAB — GLUCOSE BLD-MCNC: 117 MG/DL (ref 70–99)

## 2022-03-29 PROCEDURE — 7100000011 HC PHASE II RECOVERY - ADDTL 15 MIN: Performed by: SPECIALIST

## 2022-03-29 PROCEDURE — 88305 TISSUE EXAM BY PATHOLOGIST: CPT

## 2022-03-29 PROCEDURE — 2500000003 HC RX 250 WO HCPCS

## 2022-03-29 PROCEDURE — 2580000003 HC RX 258: Performed by: NURSE PRACTITIONER

## 2022-03-29 PROCEDURE — 7100000010 HC PHASE II RECOVERY - FIRST 15 MIN: Performed by: SPECIALIST

## 2022-03-29 PROCEDURE — 3700000000 HC ANESTHESIA ATTENDED CARE: Performed by: SPECIALIST

## 2022-03-29 PROCEDURE — 6360000002 HC RX W HCPCS

## 2022-03-29 PROCEDURE — 82962 GLUCOSE BLOOD TEST: CPT

## 2022-03-29 PROCEDURE — 45385 COLONOSCOPY W/LESION REMOVAL: CPT | Performed by: SPECIALIST

## 2022-03-29 PROCEDURE — 43239 EGD BIOPSY SINGLE/MULTIPLE: CPT | Performed by: SPECIALIST

## 2022-03-29 PROCEDURE — 88342 IMHCHEM/IMCYTCHM 1ST ANTB: CPT

## 2022-03-29 PROCEDURE — 3700000001 HC ADD 15 MINUTES (ANESTHESIA): Performed by: SPECIALIST

## 2022-03-29 PROCEDURE — 3609012400 HC EGD TRANSORAL BIOPSY SINGLE/MULTIPLE: Performed by: SPECIALIST

## 2022-03-29 PROCEDURE — 2709999900 HC NON-CHARGEABLE SUPPLY: Performed by: SPECIALIST

## 2022-03-29 PROCEDURE — 3609010600 HC COLONOSCOPY POLYPECTOMY SNARE/COLD BIOPSY: Performed by: SPECIALIST

## 2022-03-29 RX ORDER — PROPOFOL 10 MG/ML
INJECTION, EMULSION INTRAVENOUS PRN
Status: DISCONTINUED | OUTPATIENT
Start: 2022-03-29 | End: 2022-03-29 | Stop reason: SDUPTHER

## 2022-03-29 RX ORDER — LIDOCAINE HYDROCHLORIDE 20 MG/ML
INJECTION, SOLUTION INTRAVENOUS PRN
Status: DISCONTINUED | OUTPATIENT
Start: 2022-03-29 | End: 2022-03-29 | Stop reason: SDUPTHER

## 2022-03-29 RX ORDER — BUSPIRONE HYDROCHLORIDE 15 MG/1
15 TABLET ORAL
Qty: 90 TABLET | Refills: 2 | Status: SHIPPED | OUTPATIENT
Start: 2022-03-29 | End: 2022-04-07 | Stop reason: SDUPTHER

## 2022-03-29 RX ORDER — KETAMINE HYDROCHLORIDE 10 MG/ML
INJECTION, SOLUTION INTRAMUSCULAR; INTRAVENOUS PRN
Status: DISCONTINUED | OUTPATIENT
Start: 2022-03-29 | End: 2022-03-29 | Stop reason: SDUPTHER

## 2022-03-29 RX ORDER — MIDAZOLAM HYDROCHLORIDE 1 MG/ML
INJECTION INTRAMUSCULAR; INTRAVENOUS PRN
Status: DISCONTINUED | OUTPATIENT
Start: 2022-03-29 | End: 2022-03-29 | Stop reason: SDUPTHER

## 2022-03-29 RX ORDER — SODIUM CHLORIDE 9 MG/ML
25 INJECTION, SOLUTION INTRAVENOUS PRN
Status: DISCONTINUED | OUTPATIENT
Start: 2022-03-29 | End: 2022-03-29 | Stop reason: HOSPADM

## 2022-03-29 RX ORDER — SODIUM CHLORIDE 0.9 % (FLUSH) 0.9 %
5-40 SYRINGE (ML) INJECTION EVERY 12 HOURS SCHEDULED
Status: DISCONTINUED | OUTPATIENT
Start: 2022-03-29 | End: 2022-03-29 | Stop reason: HOSPADM

## 2022-03-29 RX ORDER — SODIUM CHLORIDE, SODIUM LACTATE, POTASSIUM CHLORIDE, CALCIUM CHLORIDE 600; 310; 30; 20 MG/100ML; MG/100ML; MG/100ML; MG/100ML
INJECTION, SOLUTION INTRAVENOUS CONTINUOUS
Status: DISCONTINUED | OUTPATIENT
Start: 2022-03-29 | End: 2022-03-29 | Stop reason: HOSPADM

## 2022-03-29 RX ORDER — SODIUM CHLORIDE 0.9 % (FLUSH) 0.9 %
5-40 SYRINGE (ML) INJECTION PRN
Status: DISCONTINUED | OUTPATIENT
Start: 2022-03-29 | End: 2022-03-29 | Stop reason: HOSPADM

## 2022-03-29 RX ADMIN — PROPOFOL 480 MG: 10 INJECTION, EMULSION INTRAVENOUS at 15:30

## 2022-03-29 RX ADMIN — SODIUM CHLORIDE, POTASSIUM CHLORIDE, SODIUM LACTATE AND CALCIUM CHLORIDE: 600; 310; 30; 20 INJECTION, SOLUTION INTRAVENOUS at 12:51

## 2022-03-29 RX ADMIN — LIDOCAINE HYDROCHLORIDE 100 MG: 20 INJECTION, SOLUTION INTRAVENOUS at 16:14

## 2022-03-29 RX ADMIN — KETAMINE HYDROCHLORIDE 15 MG: 10 INJECTION INTRAMUSCULAR; INTRAVENOUS at 16:14

## 2022-03-29 RX ADMIN — MIDAZOLAM 1 MG: 1 INJECTION INTRAMUSCULAR; INTRAVENOUS at 15:30

## 2022-03-29 RX ADMIN — KETAMINE HYDROCHLORIDE 10 MG: 10 INJECTION INTRAMUSCULAR; INTRAVENOUS at 15:30

## 2022-03-29 RX ADMIN — PROPOFOL 150 MG: 10 INJECTION, EMULSION INTRAVENOUS at 16:14

## 2022-03-29 ASSESSMENT — PAIN SCALES - GENERAL
PAINLEVEL_OUTOF10: 0
PAINLEVEL_OUTOF10: 0

## 2022-03-29 ASSESSMENT — PAIN - FUNCTIONAL ASSESSMENT: PAIN_FUNCTIONAL_ASSESSMENT: 0-10

## 2022-03-29 NOTE — ANESTHESIA POSTPROCEDURE EVALUATION
Department of Anesthesiology  Postprocedure Note    Patient: Sierra Sepulveda  MRN: [id number]  YOB: 1960  Date of evaluation: 3/29/2022  Time:  4:33 PM     Procedure Summary     Date: 03/29/22 Room / Location: [address]    Anesthesia Start: 1516 Anesthesia Stop: 1633    Procedures:       COLONOSCOPY POLYPECTOMY SNARE/COLD BIOPSY (N/A )      EGD BIOPSY (N/A ) Diagnosis:       Nausea      Bloating      Gastroesophageal reflux disease, unspecified whether esophagitis present      History of colon polyps      (Nausea [R11.0] Bloating [R14.0] Gastroesophageal reflux disease, unspecified whether esophagitis present [K21.9] History of colon polyps [Z86.010])    Surgeons: Diane Kan MD Responsible Provider: EDITA Barber CRNA    Anesthesia Type: MAC ASA Status: 3          Anesthesia Type: MAC    Caden Phase I:      Caden Phase II:      Last vitals: Reviewed and per EMR flowsheets.        Anesthesia Post Evaluation    Patient location during evaluation: bedside  Patient participation: complete - patient participated  Level of consciousness: awake  Pain score: 0  Airway patency: patent  Nausea & Vomiting: no nausea and no vomiting  Complications: no  Cardiovascular status: blood pressure returned to baseline  Respiratory status: acceptable and room air  Hydration status: euvolemic

## 2022-03-29 NOTE — BRIEF OP NOTE
BRIEF COLONOSCOPY REPORT:  Photos and full colonoscopy report available by going to \"chart review\" then \"procedures\" then  \"colonoscopy\" then \"View  Report\"     IMPRESSION :   1) two 3 mm polyps removed from the hepatic flexure with the cold snare  2) 6 mm polyp removed from the mid ascending colon  with the cold snare  3) 6 mm polyp removed from the splenic flexure with the cold snare  4) small internal hemorrhoids  5) otherwise normal colon             BRIEF EGD REPORT:  Photos and full EGD report available by going to [facility] review\" then \"procedures\" then  \"EGD\" then \"View Report\"     IMPRESSION :   1) petecchial erythema in the fundus- biopsied  2) otherwise normal exam  3) gastric antral and corpal biopsies taken to assay for H pylori by the Alisson-Test method    PLAN :   1) Await biopsies  2) as this appears to be functional dyspepsia, suggested she try the Buspar tid 30 min before meals- if ineffective, can try adding low-dose TCA (Elavil or Pamelor) and reducing dose of Lexapro (an SSRI)  3) Follow up with Dartha Koyanagi, APRN-CNP for postendoscopy check and pathology results  4) Colonoscopy in 5 years

## 2022-03-29 NOTE — PROGRESS NOTES
RECEIVED REPORT FROM [facility]STER PRIOR TO TRANSFER TO ENDO UNIT. SHE IS ALERT AND ORIENTED, DRANK ALL OF PREP AND HAS BEEN NPO APPROPRIATE AMOUNT OF TIME. SHE TAKES NO BETA BLOCKERS OR BLOOD THINNERS.  CONSENTS ARE SIGNED

## 2022-03-29 NOTE — PROGRESS NOTES
1636 Pt received from [facility] and report received from [facility]. Pt denies c/o. Pt drinking iced coffee  brought to her. Pt abdomen soft and non tender. Call light in reach.  at bedside. 1650 In to check on pt. Pt denies c/o. Pt abdomen soft and non tender. Dr. Soham Lagos in room to discuss procedure. 1700 Pt up to side of bed with assist. Pt tolerated well and ready to get dressed to go home.  at bedside to assist pt. Call light in reach. 5 Pt up to restroom with . Pt tolerated well and back to room.  to get car. Pt denies c/o. Call light in reach. Pt given RX. 0607 Pt discharged to home to husbands vehicle to drive her home.

## 2022-03-30 PROCEDURE — 87077 CULTURE AEROBIC IDENTIFY: CPT

## 2022-03-31 LAB — CLOTEST: NEGATIVE

## 2022-04-07 ENCOUNTER — OFFICE VISIT (OUTPATIENT)
Dept: GASTROENTEROLOGY | Age: 62
End: 2022-04-07
Payer: COMMERCIAL

## 2022-04-07 VITALS
SYSTOLIC BLOOD PRESSURE: 124 MMHG | BODY MASS INDEX: 37.94 KG/M2 | DIASTOLIC BLOOD PRESSURE: 72 MMHG | TEMPERATURE: 97 F | HEIGHT: 70 IN | WEIGHT: 265 LBS | OXYGEN SATURATION: 92 % | HEART RATE: 77 BPM

## 2022-04-07 DIAGNOSIS — A04.8 H. PYLORI INFECTION: Primary | ICD-10-CM

## 2022-04-07 DIAGNOSIS — R14.2 BELCHING: ICD-10-CM

## 2022-04-07 DIAGNOSIS — K21.9 GASTROESOPHAGEAL REFLUX DISEASE WITHOUT ESOPHAGITIS: ICD-10-CM

## 2022-04-07 DIAGNOSIS — R11.0 NAUSEA: ICD-10-CM

## 2022-04-07 DIAGNOSIS — R14.0 BLOATING: ICD-10-CM

## 2022-04-07 PROCEDURE — 99214 OFFICE O/P EST MOD 30 MIN: CPT | Performed by: NURSE PRACTITIONER

## 2022-04-07 RX ORDER — METRONIDAZOLE 250 MG/1
250 TABLET ORAL 4 TIMES DAILY
Qty: 56 TABLET | Refills: 0 | Status: SHIPPED | OUTPATIENT
Start: 2022-04-07 | End: 2022-04-21

## 2022-04-07 RX ORDER — OMEPRAZOLE 40 MG/1
40 CAPSULE, DELAYED RELEASE ORAL
Qty: 28 CAPSULE | Refills: 0 | Status: ON HOLD | OUTPATIENT
Start: 2022-04-07 | End: 2022-08-31 | Stop reason: HOSPADM

## 2022-04-07 RX ORDER — BUSPIRONE HYDROCHLORIDE 15 MG/1
15 TABLET ORAL 3 TIMES DAILY
Qty: 90 TABLET | Refills: 2 | Status: SHIPPED | OUTPATIENT
Start: 2022-04-07

## 2022-04-07 RX ORDER — TETRACYCLINE HYDROCHLORIDE 500 MG/1
500 CAPSULE ORAL 4 TIMES DAILY
Qty: 56 CAPSULE | Refills: 0 | Status: SHIPPED | OUTPATIENT
Start: 2022-04-07 | End: 2022-08-31 | Stop reason: HOSPADM

## 2022-04-07 NOTE — PROGRESS NOTES
Robby Mayfield 63 y.o. female was seen by MOHAN Ortiz on 4/7/2022     Wt Readings from Last 3 Encounters:   04/07/22 265 lb (120.2 kg)   03/29/22 253 lb (114.8 kg)   03/03/22 257 lb 6.4 oz (116.8 kg)       HPI  Robby Mayfield is a pleasant 63 y.o.  female who presents today for with her  for follow-up on EGD/colonoscopy. Her EGD showed mild proximal gastritis with petechial erythema in stomach fundus. Her stomach biopsies showed moderate chronic superficial gastritis with H. Pylori infection. Her colonoscopy showed small internal hemorrhoids and multiple adenomatous colon polyps that were removed. Her appetite is fair and weight is stable. She continues to have nausea and excess belching. No vomiting. She is taking Buspar twice daily. No abdominal pain. She has bloating on most days. Her heartburn and acid reflux is controlled with Prilosec. No nocturnal awakenings with acid reflux. No dysphagia or pain with swallowing. She has excess belching with no excess flatulence. She denies changes in her bowel pattern. Her typical bowel pattern is every other day with soft brown formed stools. No diarrhea or constipation. No blood in her stools or melena. No family history of stomach or colon cancer. ROS  Review of Systems   Constitutional: Positive for appetite change, fatigue and unexpected weight change. Negative for chills, diaphoresis and fever. HENT: Positive for tinnitus. Negative for ear pain and hearing loss. Eyes: Negative for photophobia, pain and visual disturbance. Respiratory: Negative for cough, shortness of breath and wheezing. Cardiovascular: Negative for chest pain, palpitations and leg swelling. Gastrointestinal: Positive for nausea. Negative for abdominal pain, blood in stool, constipation, diarrhea and vomiting. Endocrine: Negative for cold intolerance, heat intolerance and polydipsia.    Genitourinary: Negative for dysuria, frequency and urgency. Musculoskeletal: Positive for back pain. Negative for myalgias and neck pain. Fibromyaglia   Skin: Negative for color change, pallor and rash. Allergic/Immunologic: Negative for environmental allergies and food allergies. Neurological: Positive for dizziness. Negative for seizures, weakness and headaches. Hematological: Does not bruise/bleed easily. Psychiatric/Behavioral: Positive for sleep disturbance. Negative for dysphoric mood and suicidal ideas.  The patient is not nervous/anxious.         Allergies  Allergies   Allergen Reactions    Tape Staci Just Tape]        Medications  Current Outpatient Medications   Medication Sig Dispense Refill    busPIRone (BUSPAR) 15 MG tablet Take 15 mg by mouth 2 times daily (before meals) 90 tablet 2    JARDIANCE 25 MG tablet TAKE 1 TABLET BY MOUTH ONCE DAILY IN THE MORNING      ezetimibe (ZETIA) 10 MG tablet TAKE 1 TABLET BY MOUTH ONCE DAILY FOR 90 DAYS      omeprazole (PRILOSEC) 40 MG delayed release capsule TAKE 1 CAPSULE BY MOUTH ONCE DAILY BEFORE A MEAL      rosuvastatin (CRESTOR) 20 MG tablet       ondansetron (ZOFRAN) 4 MG tablet Take 1 tablet by mouth daily as needed for Nausea or Vomiting 30 tablet 2    lidocaine-prilocaine (EMLA) 2.5-2.5 % cream Apply topically AS NEED 1 Tube 0    Icosapent Ethyl (VASCEPA PO) Take 2 tablets by mouth 2 times daily Takes 10mg - taking 2 tabs bid      pioglitazone (ACTOS) 15 MG tablet       escitalopram (LEXAPRO) 20 MG tablet Take 20 mg by mouth      b complex vitamins capsule Take 1 capsule by mouth daily      melatonin 3 MG TABS tablet Take 3 mg by mouth nightly      gabapentin (NEURONTIN) 600 MG tablet 600 mg 2 times daily       metFORMIN ER (GLUCOPHAGE-XR) 500 MG XR tablet 500 mg nightly       aspirin 81 MG tablet Take 81 mg by mouth daily      butalbital-acetaminophen-caffeine (FIORICET) -40 MG per tablet Take 1 tablet by mouth every 4 hours as needed for Pain 60 tablet 0    LEVEMIR FLEXTOUCH 100 UNIT/ML injection pen Inject 70 Units into the skin daily       NOVOFINE 32G X 6 MM MISC       ONE TOUCH ULTRA TEST strip        No current facility-administered medications for this visit. Past medical history:   She has a past medical history of Asthma, Cancer ([facility] [address] Diabetes mellitus ([facility] [address] Fibromyalgia, History of loop recorder, Hyperlipidemia, Hypertension, Neurocardiogenic syncope, and Type II or unspecified type diabetes mellitus without mention of complication, not stated as uncontrolled. Past surgical history:  She has a past surgical history that includes Cholecystectomy; Rotator cuff repair (Right); Insertable Cardiac Monitor; Colonoscopy; Elbow surgery (Right); Knee arthroscopy (Bilateral); Endoscopy, colon, diagnostic; Cardiac catheterization; Breast surgery (Left);  section; [address] Surgery; Colonoscopy (N/A, 3/29/2022); and Upper gastrointestinal endoscopy (N/A, 3/29/2022). Social History:  She reports that she has never smoked. She has never used smokeless tobacco. She reports that she does not drink alcohol. Family history:  Her family history includes Cancer in her mother. Objective    Vitals:    22 1015   BP: 124/72   Pulse: 77   Temp: 97 °F (36.1 °C)   SpO2: 92%        Physical exam    Physical Exam  Vitals reviewed. Constitutional:       General: She is not in acute distress. Appearance: She is well-developed. She is obese. She is not ill-appearing, toxic-appearing or diaphoretic. HENT:      Head: Normocephalic and atraumatic. Nose: Nose normal.      Mouth/Throat:      Mouth: Mucous membranes are moist.   Eyes:      Conjunctiva/sclera: Conjunctivae normal.      Pupils: Pupils are equal, round, and reactive to light. Neck:      Thyroid: No thyromegaly. Vascular: No JVD. Trachea: No tracheal deviation. Cardiovascular:      Rate and Rhythm: Normal rate and regular rhythm. Pulses: Normal pulses.       Heart sounds: Normal heart sounds. No murmur heard. No friction rub. No gallop. Pulmonary:      Effort: Pulmonary effort is normal. No respiratory distress. Breath sounds: Normal breath sounds. No stridor. No wheezing, rhonchi or rales. Chest:      Chest wall: No tenderness. Abdominal:      General: Bowel sounds are normal. There is no distension. Palpations: Abdomen is soft. There is no mass. Tenderness: There is no abdominal tenderness. There is no guarding or rebound. Hernia: No hernia is present. Musculoskeletal:         General: Normal range of motion. Cervical back: Normal range of motion and neck supple. Lymphadenopathy:      Cervical: No cervical adenopathy. Skin:     General: Skin is warm and dry. Neurological:      Mental Status: She is alert and oriented to person, place, and time. Psychiatric:         Mood and Affect: Mood normal.         Admission on 03/29/2022, Discharged on 03/29/2022   Component Date Value Ref Range Status    POC Glucose 03/29/2022 117* 70 - 99 MG/DL Final    Clotest 03/30/2022 NEGATIVE  NEGATIVE Final       Assessment:   1.  H. Pylori infection-EGD pathology showed special stain, Immunoperoxidase (control adequate): H. pylori - Positive. 2.  Persistent nausea will order gastric emptying study to rule out gastroparesis. Encouraged to eat small meals that are low fat diet. 3.  Abdominal bloating may be secondary to H. Pylori infection gastritis and diet related. 4.  GERD without dysphagia or odynophagia. EGD showed no evidence of Santoyo's esophagus. 5.  Adenomatous colon polyps that were removed.        Plan:  1. Will plan to treat H. Pylori infection with Tetracycline, Flagyl and Bismuth four times daily and Prilosec twice daily for 14 days. After treatment will hold Prilosec for two weeks and submit H. Pylori breath test to ensure infection has been eradicated.   2.  The patient was encouraged to continue taking Prilosec daily for treatment of

## 2022-05-04 ENCOUNTER — HOSPITAL ENCOUNTER (OUTPATIENT)
Dept: NUCLEAR MEDICINE | Age: 62
Discharge: HOME | End: 2022-05-04
Payer: COMMERCIAL

## 2022-05-04 DIAGNOSIS — R11.0 NAUSEA: ICD-10-CM

## 2022-05-04 DIAGNOSIS — R14.2 BELCHING: ICD-10-CM

## 2022-05-04 PROCEDURE — A9541 TC99M SULFUR COLLOID: HCPCS | Performed by: NURSE PRACTITIONER

## 2022-05-04 PROCEDURE — 78264 GASTRIC EMPTYING IMG STUDY: CPT

## 2022-05-04 PROCEDURE — 3430000000 HC RX DIAGNOSTIC RADIOPHARMACEUTICAL: Performed by: NURSE PRACTITIONER

## 2022-05-04 RX ADMIN — Medication 1.3 MILLICURIE: at 14:55

## 2022-06-09 ENCOUNTER — OFFICE VISIT (OUTPATIENT)
Dept: GASTROENTEROLOGY | Age: 62
End: 2022-06-09
Payer: COMMERCIAL

## 2022-06-09 VITALS
HEART RATE: 86 BPM | BODY MASS INDEX: 37.59 KG/M2 | HEIGHT: 70 IN | WEIGHT: 262.6 LBS | TEMPERATURE: 97.2 F | DIASTOLIC BLOOD PRESSURE: 84 MMHG | OXYGEN SATURATION: 95 % | SYSTOLIC BLOOD PRESSURE: 126 MMHG

## 2022-06-09 DIAGNOSIS — Z86.010 HISTORY OF ADENOMATOUS POLYP OF COLON: ICD-10-CM

## 2022-06-09 DIAGNOSIS — K21.9 GASTROESOPHAGEAL REFLUX DISEASE WITHOUT ESOPHAGITIS: ICD-10-CM

## 2022-06-09 DIAGNOSIS — R11.0 NAUSEA: ICD-10-CM

## 2022-06-09 DIAGNOSIS — A04.8 H. PYLORI INFECTION: Primary | ICD-10-CM

## 2022-06-09 PROCEDURE — 99213 OFFICE O/P EST LOW 20 MIN: CPT | Performed by: NURSE PRACTITIONER

## 2022-06-09 RX ORDER — TERBINAFINE HYDROCHLORIDE 250 MG/1
TABLET ORAL
COMMUNITY
Start: 2022-06-08

## 2022-06-09 NOTE — PROGRESS NOTES
Gildardo Ty 63 y.o. female was seen by MOHAN Gasca on 6/9/2022     Wt Readings from Last 3 Encounters:   06/09/22 262 lb 9.6 oz (119.1 kg)   04/07/22 265 lb (120.2 kg)   03/29/22 253 lb (114.8 kg)       HPI  Gildardo Ty is a pleasant 63 y.o.  female who presents today with her  for follow-up on H. Pylori infection. She reports feeling better with resolution in her prior symptoms. She completed her H. Pylori treatment without difficulty with last dose taken on 4-. She is feeling good. Her gastric emptying study done on 5-4-2022 showed:   Normal early and mid phase gastric emptying of solids.  Slightly delay late   phase gastric emptying of solids.  Extrapolated emptying at 240 minutes is   82% with normal above 90%.  Findings compatible with late phase gastric   retention.  Advise correlation for diabetes. Her appetite is good and weight is stable. Intermittent nausea. No vomiting. No abdominal pain, bloating or distention. Her heartburn and acid reflux is controlled with Prilosec.  No nocturnal awakenings with acid reflux.  No dysphagia or pain with swallowing.  No excess belching or flatulence.  She denies changes in her bowel pattern.  Her typical bowel pattern is every other day with soft brown formed stools.  No diarrhea or constipation.  No blood in her stools or melena.  No family history of stomach or colon cancer. ROS  Review of Systems   Constitutional: Positive for fatigue. Negative for chills, diaphoresis and fever. HENT: Positive for tinnitus. Negative for ear pain and hearing loss.    Eyes: Negative for photophobia, pain and visual disturbance. Respiratory: Negative for cough, shortness of breath and wheezing.    Cardiovascular: Negative for chest pain, palpitations and leg swelling. Gastrointestinal: Negative for abdominal pain, blood in stool, constipation, diarrhea, nausea and vomiting.    Endocrine: Negative for cold intolerance, heat intolerance and polydipsia. Genitourinary: Negative for dysuria, frequency and urgency. Musculoskeletal: Positive for back pain. Negative for myalgias and neck pain.        Fibromyaglia   Skin: Negative for color change, pallor and rash. Allergic/Immunologic: Negative for environmental allergies and food allergies. Neurological: Positive for dizziness. Negative for seizures, weakness and headaches. Hematological: Does not bruise/bleed easily. Psychiatric/Behavioral: Positive for sleep disturbance. Negative for dysphoric mood and suicidal ideas.  The patient is not nervous/anxious.      Allergies  Allergies   Allergen Reactions    Tape Connie Rice Tape]        Medications  Current Outpatient Medications   Medication Sig Dispense Refill    metoprolol tartrate (LOPRESSOR) 25 MG tablet       terbinafine (LAMISIL) 250 MG tablet       busPIRone (BUSPAR) 15 MG tablet Take 15 mg by mouth 3 times daily 90 tablet 2    tetracycline (ACHROMYCIN;SUMYCIN) 500 MG capsule Take 1 capsule by mouth 4 times daily 56 capsule 0    JARDIANCE 25 MG tablet TAKE 1 TABLET BY MOUTH ONCE DAILY IN THE MORNING      ezetimibe (ZETIA) 10 MG tablet TAKE 1 TABLET BY MOUTH ONCE DAILY FOR 90 DAYS      omeprazole (PRILOSEC) 40 MG delayed release capsule TAKE 1 CAPSULE BY MOUTH ONCE DAILY BEFORE A MEAL      rosuvastatin (CRESTOR) 20 MG tablet       ondansetron (ZOFRAN) 4 MG tablet Take 1 tablet by mouth daily as needed for Nausea or Vomiting 30 tablet 2    lidocaine-prilocaine (EMLA) 2.5-2.5 % cream Apply topically AS NEED 1 Tube 0    Icosapent Ethyl (VASCEPA PO) Take 2 tablets by mouth 2 times daily Takes 10mg - taking 2 tabs bid      pioglitazone (ACTOS) 15 MG tablet       b complex vitamins capsule Take 1 capsule by mouth daily      melatonin 3 MG TABS tablet Take 3 mg by mouth nightly      gabapentin (NEURONTIN) 600 MG tablet 600 mg 2 times daily       metFORMIN ER (GLUCOPHAGE-XR) 500 MG XR tablet 500 mg nightly       aspirin 81 MG tablet Take 81 mg by mouth daily      butalbital-acetaminophen-caffeine (FIORICET) -40 MG per tablet Take 1 tablet by mouth every 4 hours as needed for Pain 60 tablet 0    LEVEMIR FLEXTOUCH 100 UNIT/ML injection pen Inject 70 Units into the skin daily       NOVOFINE 32G X 6 MM MISC       ONE TOUCH ULTRA TEST strip       omeprazole (PRILOSEC) 40 MG delayed release capsule Take 1 capsule by mouth 2 times daily (before meals) for 14 days 28 capsule 0    escitalopram (LEXAPRO) 20 MG tablet Take 20 mg by mouth       No current facility-administered medications for this visit. Past medical history:   She has a past medical history of Asthma, Cancer ([facility] [address] Diabetes mellitus ([facility] [address] Fibromyalgia, History of loop recorder, Hyperlipidemia, Hypertension, Neurocardiogenic syncope, and Type II or unspecified type diabetes mellitus without mention of complication, not stated as uncontrolled. Past surgical history:  She has a past surgical history that includes Cholecystectomy; Rotator cuff repair (Right); Insertable Cardiac Monitor; Colonoscopy; Elbow surgery (Right); Knee arthroscopy (Bilateral); Endoscopy, colon, diagnostic; Cardiac catheterization; Breast surgery (Left);  section; [address] Surgery; Colonoscopy (N/A, 3/29/2022); and Upper gastrointestinal endoscopy (N/A, 3/29/2022). Social History:  She reports that she has never smoked. She has never used smokeless tobacco. She reports that she does not drink alcohol. Family history:  Her family history includes Cancer in her mother. Objective    Vitals:    22 1112   BP: 126/84   Pulse: 86   Temp: 97.2 °F (36.2 °C)   SpO2: 95%        Physical exam    Physical Exam  Vitals reviewed. Constitutional:       General: She is not in acute distress. Appearance: She is well-developed. She is obese. She is not ill-appearing, toxic-appearing or diaphoretic. HENT:      Head: Normocephalic and atraumatic. Nose: Nose normal.      Mouth/Throat:      Mouth: Mucous membranes are moist.   Eyes:      Conjunctiva/sclera: Conjunctivae normal.      Pupils: Pupils are equal, round, and reactive to light. Neck:      Thyroid: No thyromegaly. Vascular: No JVD. Trachea: No tracheal deviation. Cardiovascular:      Rate and Rhythm: Normal rate and regular rhythm. Pulses: Normal pulses. Heart sounds: Normal heart sounds. No murmur heard. No friction rub. No gallop. Pulmonary:      Effort: Pulmonary effort is normal. No respiratory distress. Breath sounds: Normal breath sounds. No stridor. No wheezing, rhonchi or rales. Chest:      Chest wall: No tenderness. Abdominal:      General: Bowel sounds are normal. There is no distension. Palpations: Abdomen is soft. There is no mass. Tenderness: There is no abdominal tenderness. There is no guarding or rebound. Hernia: No hernia is present. Musculoskeletal:         General: Normal range of motion. Cervical back: Normal range of motion and neck supple. Lymphadenopathy:      Cervical: No cervical adenopathy. Skin:     General: Skin is warm and dry. Neurological:      Mental Status: She is alert and oriented to person, place, and time. Psychiatric:         Mood and Affect: Mood normal.         No visits with results within 2 Month(s) from this visit. Latest known visit with results is:   Admission on 03/29/2022, Discharged on 03/29/2022   Component Date Value Ref Range Status    POC Glucose 03/29/2022 117* 70 - 99 MG/DL Final    Clotest 03/30/2022 NEGATIVE  NEGATIVE Final       Assessment and Plan:  1.  H. Pylori infection that has been treated with good results. 2.  Persistent nausea that is non-worsening  3.  GERD without dysphagia or odynophagia. EGD showed no evidence of Santoyo's esophagus.   4.  History of adenomatous colon polyps that were removed.        Plan:  1.   Will hold Prilosec for two weeks then submit H. Pylori breath test to ensure H. Pylori infection has been eradicated. 2.  The patient was encouraged to continue taking Prilosec daily for treatment of acid reflux.  Avoid NSAID and diet modification  3.  Gastric emptying showed mild delay in stomach emptying. The patient was encouraged to continue taking antiemetics as needed for nausea and eat small meals that are low in fat/fiber. 4.  Recommend repeat colonoscopy in five years. 5.  The patient was encouraged to follow-up as needed.     Total time:  20 minutes.

## 2022-08-28 ENCOUNTER — APPOINTMENT (OUTPATIENT)
Dept: GENERAL RADIOLOGY | Age: 62
DRG: 247 | End: 2022-08-28
Payer: COMMERCIAL

## 2022-08-28 ENCOUNTER — HOSPITAL ENCOUNTER (INPATIENT)
Age: 62
LOS: 1 days | Discharge: HOME | DRG: 247 | End: 2022-08-31
Attending: STUDENT IN AN ORGANIZED HEALTH CARE EDUCATION/TRAINING PROGRAM | Admitting: STUDENT IN AN ORGANIZED HEALTH CARE EDUCATION/TRAINING PROGRAM
Payer: COMMERCIAL

## 2022-08-28 DIAGNOSIS — R07.9 CHEST PAIN, UNSPECIFIED TYPE: Primary | ICD-10-CM

## 2022-08-28 DIAGNOSIS — M25.531 RIGHT WRIST PAIN: ICD-10-CM

## 2022-08-28 LAB
ALBUMIN SERPL-MCNC: 4.1 GM/DL (ref 3.4–5)
ALP BLD-CCNC: 124 IU/L (ref 40–129)
ALT SERPL-CCNC: 17 U/L (ref 10–40)
ANION GAP SERPL CALCULATED.3IONS-SCNC: 17 MMOL/L (ref 4–16)
AST SERPL-CCNC: 17 IU/L (ref 15–37)
BASOPHILS ABSOLUTE: 0.1 K/CU MM
BASOPHILS RELATIVE PERCENT: 0.7 % (ref 0–1)
BILIRUB SERPL-MCNC: 0.2 MG/DL (ref 0–1)
BUN BLDV-MCNC: 14 MG/DL (ref 6–23)
CALCIUM SERPL-MCNC: 9.5 MG/DL (ref 8.3–10.6)
CHLORIDE BLD-SCNC: 99 MMOL/L (ref 99–110)
CO2: 24 MMOL/L (ref 21–32)
CREAT SERPL-MCNC: 0.6 MG/DL (ref 0.6–1.1)
DIFFERENTIAL TYPE: ABNORMAL
EOSINOPHILS ABSOLUTE: 0.2 K/CU MM
EOSINOPHILS RELATIVE PERCENT: 2.1 % (ref 0–3)
GFR AFRICAN AMERICAN: >60 ML/MIN/1.73M2
GFR NON-AFRICAN AMERICAN: >60 ML/MIN/1.73M2
GLUCOSE BLD-MCNC: 249 MG/DL (ref 70–99)
HCT VFR BLD CALC: 42.3 % (ref 37–47)
HEMOGLOBIN: 13.8 GM/DL (ref 12.5–16)
IMMATURE NEUTROPHIL %: 0.4 % (ref 0–0.43)
LIPASE: 69 IU/L (ref 13–60)
LYMPHOCYTES ABSOLUTE: 4.8 K/CU MM
LYMPHOCYTES RELATIVE PERCENT: 42.9 % (ref 24–44)
MCH RBC QN AUTO: 28.5 PG (ref 27–31)
MCHC RBC AUTO-ENTMCNC: 32.6 % (ref 32–36)
MCV RBC AUTO: 87.2 FL (ref 78–100)
MONOCYTES ABSOLUTE: 0.8 K/CU MM
MONOCYTES RELATIVE PERCENT: 7 % (ref 0–4)
NUCLEATED RBC %: 0 %
PDW BLD-RTO: 13.2 % (ref 11.7–14.9)
PLATELET # BLD: 301 K/CU MM (ref 140–440)
PMV BLD AUTO: 10 FL (ref 7.5–11.1)
POTASSIUM SERPL-SCNC: 4.3 MMOL/L (ref 3.5–5.1)
PRO-BNP: 276.4 PG/ML
RBC # BLD: 4.85 M/CU MM (ref 4.2–5.4)
SEGMENTED NEUTROPHILS ABSOLUTE COUNT: 5.3 K/CU MM
SEGMENTED NEUTROPHILS RELATIVE PERCENT: 46.9 % (ref 36–66)
SODIUM BLD-SCNC: 140 MMOL/L (ref 135–145)
TOTAL IMMATURE NEUTOROPHIL: 0.04 K/CU MM
TOTAL NUCLEATED RBC: 0 K/CU MM
TOTAL PROTEIN: 7.4 GM/DL (ref 6.4–8.2)
TROPONIN T: <0.01 NG/ML
WBC # BLD: 11.2 K/CU MM (ref 4–10.5)

## 2022-08-28 PROCEDURE — 84484 ASSAY OF TROPONIN QUANT: CPT

## 2022-08-28 PROCEDURE — 80053 COMPREHEN METABOLIC PANEL: CPT

## 2022-08-28 PROCEDURE — 71045 X-RAY EXAM CHEST 1 VIEW: CPT

## 2022-08-28 PROCEDURE — 83690 ASSAY OF LIPASE: CPT

## 2022-08-28 PROCEDURE — 93005 ELECTROCARDIOGRAM TRACING: CPT | Performed by: PHYSICIAN ASSISTANT

## 2022-08-28 PROCEDURE — 99285 EMERGENCY DEPT VISIT HI MDM: CPT

## 2022-08-28 PROCEDURE — 85025 COMPLETE CBC W/AUTO DIFF WBC: CPT

## 2022-08-28 PROCEDURE — 83880 ASSAY OF NATRIURETIC PEPTIDE: CPT

## 2022-08-28 RX ORDER — ASPIRIN 81 MG/1
324 TABLET, CHEWABLE ORAL ONCE
Status: DISCONTINUED | OUTPATIENT
Start: 2022-08-28 | End: 2022-08-30 | Stop reason: SDUPTHER

## 2022-08-28 ASSESSMENT — PAIN - FUNCTIONAL ASSESSMENT
PAIN_FUNCTIONAL_ASSESSMENT: 0-10
PAIN_FUNCTIONAL_ASSESSMENT: 0-10

## 2022-08-28 ASSESSMENT — PAIN SCALES - GENERAL
PAINLEVEL_OUTOF10: 2
PAINLEVEL_OUTOF10: 2

## 2022-08-28 ASSESSMENT — PAIN DESCRIPTION - LOCATION
LOCATION: CHEST
LOCATION: CHEST

## 2022-08-28 ASSESSMENT — PAIN DESCRIPTION - DESCRIPTORS
DESCRIPTORS: ACHING
DESCRIPTORS: ACHING

## 2022-08-29 ENCOUNTER — APPOINTMENT (OUTPATIENT)
Dept: CT IMAGING | Age: 62
DRG: 247 | End: 2022-08-29
Payer: COMMERCIAL

## 2022-08-29 PROBLEM — R07.9 CHEST PAIN: Status: ACTIVE | Noted: 2022-08-29

## 2022-08-29 LAB
APTT: 23.2 SECONDS (ref 25.1–37.1)
EKG ATRIAL RATE: 90 BPM
EKG DIAGNOSIS: NORMAL
EKG P AXIS: 59 DEGREES
EKG P-R INTERVAL: 142 MS
EKG Q-T INTERVAL: 376 MS
EKG QRS DURATION: 86 MS
EKG QTC CALCULATION (BAZETT): 459 MS
EKG R AXIS: 11 DEGREES
EKG T AXIS: 54 DEGREES
EKG VENTRICULAR RATE: 90 BPM
ESTIMATED AVERAGE GLUCOSE: 223 MG/DL
GLUCOSE BLD-MCNC: 144 MG/DL (ref 70–99)
GLUCOSE BLD-MCNC: 148 MG/DL (ref 70–99)
GLUCOSE BLD-MCNC: 148 MG/DL (ref 70–99)
GLUCOSE BLD-MCNC: 167 MG/DL (ref 70–99)
GLUCOSE BLD-MCNC: 202 MG/DL (ref 70–99)
HBA1C MFR BLD: 9.4 % (ref 4.2–6.3)
INR BLD: 0.91 INDEX
LV EF: 45 %
LVEF MODALITY: NORMAL
PROTHROMBIN TIME: 11.7 SECONDS (ref 11.7–14.5)
TOTAL CK: 39 IU/L (ref 26–140)
TROPONIN T: 0.01 NG/ML
TROPONIN T: <0.01 NG/ML

## 2022-08-29 PROCEDURE — 85610 PROTHROMBIN TIME: CPT

## 2022-08-29 PROCEDURE — 2580000003 HC RX 258: Performed by: NURSE PRACTITIONER

## 2022-08-29 PROCEDURE — 82962 GLUCOSE BLOOD TEST: CPT

## 2022-08-29 PROCEDURE — 71275 CT ANGIOGRAPHY CHEST: CPT

## 2022-08-29 PROCEDURE — 6360000004 HC RX CONTRAST MEDICATION

## 2022-08-29 PROCEDURE — G0378 HOSPITAL OBSERVATION PER HR: HCPCS

## 2022-08-29 PROCEDURE — 2580000003 HC RX 258: Performed by: INTERNAL MEDICINE

## 2022-08-29 PROCEDURE — 93306 TTE W/DOPPLER COMPLETE: CPT

## 2022-08-29 PROCEDURE — 6370000000 HC RX 637 (ALT 250 FOR IP): Performed by: STUDENT IN AN ORGANIZED HEALTH CARE EDUCATION/TRAINING PROGRAM

## 2022-08-29 PROCEDURE — 93010 ELECTROCARDIOGRAM REPORT: CPT | Performed by: INTERNAL MEDICINE

## 2022-08-29 PROCEDURE — 94761 N-INVAS EAR/PLS OXIMETRY MLT: CPT

## 2022-08-29 PROCEDURE — 2580000003 HC RX 258: Performed by: STUDENT IN AN ORGANIZED HEALTH CARE EDUCATION/TRAINING PROGRAM

## 2022-08-29 PROCEDURE — 84484 ASSAY OF TROPONIN QUANT: CPT

## 2022-08-29 PROCEDURE — 82550 ASSAY OF CK (CPK): CPT

## 2022-08-29 PROCEDURE — 6360000002 HC RX W HCPCS: Performed by: NURSE PRACTITIONER

## 2022-08-29 PROCEDURE — 83036 HEMOGLOBIN GLYCOSYLATED A1C: CPT

## 2022-08-29 PROCEDURE — 76937 US GUIDE VASCULAR ACCESS: CPT

## 2022-08-29 PROCEDURE — 36415 COLL VENOUS BLD VENIPUNCTURE: CPT

## 2022-08-29 PROCEDURE — 6360000004 HC RX CONTRAST MEDICATION: Performed by: INTERNAL MEDICINE

## 2022-08-29 PROCEDURE — 6370000000 HC RX 637 (ALT 250 FOR IP): Performed by: PHYSICIAN ASSISTANT

## 2022-08-29 PROCEDURE — 85730 THROMBOPLASTIN TIME PARTIAL: CPT

## 2022-08-29 PROCEDURE — 2500000003 HC RX 250 WO HCPCS: Performed by: NURSE PRACTITIONER

## 2022-08-29 PROCEDURE — 6370000000 HC RX 637 (ALT 250 FOR IP): Performed by: INTERNAL MEDICINE

## 2022-08-29 RX ORDER — NYSTATIN 100000 U/G
CREAM TOPICAL ONCE
Status: COMPLETED | OUTPATIENT
Start: 2022-08-29 | End: 2022-08-29

## 2022-08-29 RX ORDER — ENOXAPARIN SODIUM 100 MG/ML
30 INJECTION SUBCUTANEOUS 2 TIMES DAILY
Status: DISCONTINUED | OUTPATIENT
Start: 2022-08-30 | End: 2022-08-30

## 2022-08-29 RX ORDER — ACETAMINOPHEN 325 MG/1
650 TABLET ORAL EVERY 6 HOURS PRN
Status: DISCONTINUED | OUTPATIENT
Start: 2022-08-29 | End: 2022-08-31 | Stop reason: HOSPADM

## 2022-08-29 RX ORDER — DEXTROSE MONOHYDRATE 100 MG/ML
INJECTION, SOLUTION INTRAVENOUS CONTINUOUS PRN
Status: DISCONTINUED | OUTPATIENT
Start: 2022-08-29 | End: 2022-08-31 | Stop reason: HOSPADM

## 2022-08-29 RX ORDER — LIDOCAINE 4 G/G
1 PATCH TOPICAL DAILY
Status: DISCONTINUED | OUTPATIENT
Start: 2022-08-29 | End: 2022-08-31 | Stop reason: HOSPADM

## 2022-08-29 RX ORDER — ONDANSETRON 2 MG/ML
4 INJECTION INTRAMUSCULAR; INTRAVENOUS EVERY 6 HOURS PRN
Status: DISCONTINUED | OUTPATIENT
Start: 2022-08-29 | End: 2022-08-31 | Stop reason: HOSPADM

## 2022-08-29 RX ORDER — GABAPENTIN 300 MG/1
600 CAPSULE ORAL DAILY PRN
Status: DISCONTINUED | OUTPATIENT
Start: 2022-08-29 | End: 2022-08-31 | Stop reason: HOSPADM

## 2022-08-29 RX ORDER — INSULIN GLARGINE 100 [IU]/ML
50 INJECTION, SOLUTION SUBCUTANEOUS EVERY EVENING
Status: DISCONTINUED | OUTPATIENT
Start: 2022-08-29 | End: 2022-08-31 | Stop reason: HOSPADM

## 2022-08-29 RX ORDER — LANOLIN ALCOHOL/MO/W.PET/CERES
3 CREAM (GRAM) TOPICAL NIGHTLY
Status: DISCONTINUED | OUTPATIENT
Start: 2022-08-29 | End: 2022-08-31 | Stop reason: HOSPADM

## 2022-08-29 RX ORDER — INSULIN LISPRO 100 [IU]/ML
0-8 INJECTION, SOLUTION INTRAVENOUS; SUBCUTANEOUS
Status: DISCONTINUED | OUTPATIENT
Start: 2022-08-29 | End: 2022-08-31 | Stop reason: HOSPADM

## 2022-08-29 RX ORDER — MAGNESIUM HYDROXIDE/ALUMINUM HYDROXICE/SIMETHICONE 120; 1200; 1200 MG/30ML; MG/30ML; MG/30ML
30 SUSPENSION ORAL EVERY 6 HOURS PRN
Status: DISCONTINUED | OUTPATIENT
Start: 2022-08-29 | End: 2022-08-31 | Stop reason: HOSPADM

## 2022-08-29 RX ORDER — ONDANSETRON 4 MG/1
4 TABLET, ORALLY DISINTEGRATING ORAL EVERY 8 HOURS PRN
Status: DISCONTINUED | OUTPATIENT
Start: 2022-08-29 | End: 2022-08-31 | Stop reason: HOSPADM

## 2022-08-29 RX ORDER — INSULIN LISPRO 100 [IU]/ML
15 INJECTION, SOLUTION INTRAVENOUS; SUBCUTANEOUS
Status: DISCONTINUED | OUTPATIENT
Start: 2022-08-29 | End: 2022-08-31 | Stop reason: HOSPADM

## 2022-08-29 RX ORDER — PANTOPRAZOLE SODIUM 40 MG/1
40 TABLET, DELAYED RELEASE ORAL
Status: DISCONTINUED | OUTPATIENT
Start: 2022-08-29 | End: 2022-08-31 | Stop reason: HOSPADM

## 2022-08-29 RX ORDER — KETOROLAC TROMETHAMINE 30 MG/ML
30 INJECTION, SOLUTION INTRAMUSCULAR; INTRAVENOUS EVERY 6 HOURS PRN
Status: DISCONTINUED | OUTPATIENT
Start: 2022-08-29 | End: 2022-08-30

## 2022-08-29 RX ORDER — SODIUM CHLORIDE 0.9 % (FLUSH) 0.9 %
5-40 SYRINGE (ML) INJECTION EVERY 12 HOURS SCHEDULED
Status: DISCONTINUED | OUTPATIENT
Start: 2022-08-29 | End: 2022-08-31 | Stop reason: HOSPADM

## 2022-08-29 RX ORDER — BUSPIRONE HYDROCHLORIDE 15 MG/1
15 TABLET ORAL 3 TIMES DAILY
Status: DISCONTINUED | OUTPATIENT
Start: 2022-08-29 | End: 2022-08-31 | Stop reason: HOSPADM

## 2022-08-29 RX ORDER — ROSUVASTATIN CALCIUM 20 MG/1
20 TABLET, COATED ORAL NIGHTLY
Status: DISCONTINUED | OUTPATIENT
Start: 2022-08-29 | End: 2022-08-31 | Stop reason: HOSPADM

## 2022-08-29 RX ORDER — LORAZEPAM 1 MG/1
1 TABLET ORAL ONCE
Status: COMPLETED | OUTPATIENT
Start: 2022-08-29 | End: 2022-08-29

## 2022-08-29 RX ORDER — SODIUM CHLORIDE 0.9 % (FLUSH) 0.9 %
5-40 SYRINGE (ML) INJECTION PRN
Status: DISCONTINUED | OUTPATIENT
Start: 2022-08-29 | End: 2022-08-31 | Stop reason: HOSPADM

## 2022-08-29 RX ORDER — ASPIRIN 81 MG/1
81 TABLET, CHEWABLE ORAL DAILY
Status: DISCONTINUED | OUTPATIENT
Start: 2022-08-29 | End: 2022-08-30 | Stop reason: SDUPTHER

## 2022-08-29 RX ORDER — SODIUM CHLORIDE 9 MG/ML
INJECTION, SOLUTION INTRAVENOUS PRN
Status: DISCONTINUED | OUTPATIENT
Start: 2022-08-29 | End: 2022-08-31 | Stop reason: HOSPADM

## 2022-08-29 RX ORDER — SODIUM CHLORIDE 9 MG/ML
INJECTION, SOLUTION INTRAVENOUS CONTINUOUS
Status: DISCONTINUED | OUTPATIENT
Start: 2022-08-29 | End: 2022-08-29

## 2022-08-29 RX ORDER — INSULIN LISPRO 100 [IU]/ML
0-4 INJECTION, SOLUTION INTRAVENOUS; SUBCUTANEOUS EVERY EVENING
Status: DISCONTINUED | OUTPATIENT
Start: 2022-08-29 | End: 2022-08-31 | Stop reason: HOSPADM

## 2022-08-29 RX ORDER — ESCITALOPRAM OXALATE 10 MG/1
20 TABLET ORAL DAILY
Status: DISCONTINUED | OUTPATIENT
Start: 2022-08-29 | End: 2022-08-31 | Stop reason: HOSPADM

## 2022-08-29 RX ORDER — SODIUM CHLORIDE 9 MG/ML
INJECTION, SOLUTION INTRAVENOUS CONTINUOUS
Status: DISCONTINUED | OUTPATIENT
Start: 2022-08-29 | End: 2022-08-30

## 2022-08-29 RX ORDER — EZETIMIBE 10 MG/1
10 TABLET ORAL NIGHTLY
Status: DISCONTINUED | OUTPATIENT
Start: 2022-08-29 | End: 2022-08-31 | Stop reason: HOSPADM

## 2022-08-29 RX ORDER — BUTALBITAL, ACETAMINOPHEN AND CAFFEINE 50; 325; 40 MG/1; MG/1; MG/1
1 TABLET ORAL EVERY 6 HOURS PRN
Status: DISCONTINUED | OUTPATIENT
Start: 2022-08-29 | End: 2022-08-31 | Stop reason: HOSPADM

## 2022-08-29 RX ORDER — POLYETHYLENE GLYCOL 3350 17 G/17G
17 POWDER, FOR SOLUTION ORAL DAILY PRN
Status: DISCONTINUED | OUTPATIENT
Start: 2022-08-29 | End: 2022-08-31 | Stop reason: HOSPADM

## 2022-08-29 RX ADMIN — METOPROLOL TARTRATE 25 MG: 25 TABLET, FILM COATED ORAL at 08:55

## 2022-08-29 RX ADMIN — INSULIN LISPRO 15 UNITS: 100 INJECTION, SOLUTION INTRAVENOUS; SUBCUTANEOUS at 10:31

## 2022-08-29 RX ADMIN — Medication 3 MG: at 21:02

## 2022-08-29 RX ADMIN — ESCITALOPRAM OXALATE 20 MG: 10 TABLET ORAL at 08:55

## 2022-08-29 RX ADMIN — BUSPIRONE HYDROCHLORIDE 15 MG: 15 TABLET ORAL at 21:02

## 2022-08-29 RX ADMIN — SODIUM CHLORIDE: 9 INJECTION, SOLUTION INTRAVENOUS at 21:09

## 2022-08-29 RX ADMIN — METOPROLOL TARTRATE 25 MG: 25 TABLET, FILM COATED ORAL at 21:02

## 2022-08-29 RX ADMIN — BUSPIRONE HYDROCHLORIDE 15 MG: 15 TABLET ORAL at 15:37

## 2022-08-29 RX ADMIN — ACETAMINOPHEN 650 MG: 325 TABLET ORAL at 06:45

## 2022-08-29 RX ADMIN — INSULIN GLARGINE 50 UNITS: 100 INJECTION, SOLUTION SUBCUTANEOUS at 21:01

## 2022-08-29 RX ADMIN — PANTOPRAZOLE SODIUM 40 MG: 40 TABLET, DELAYED RELEASE ORAL at 06:44

## 2022-08-29 RX ADMIN — MICONAZOLE NITRATE: 2 POWDER TOPICAL at 10:30

## 2022-08-29 RX ADMIN — KETOROLAC TROMETHAMINE 30 MG: 30 INJECTION, SOLUTION INTRAMUSCULAR; INTRAVENOUS at 11:24

## 2022-08-29 RX ADMIN — ASPIRIN 81 MG CHEWABLE TABLET 81 MG: 81 TABLET CHEWABLE at 08:55

## 2022-08-29 RX ADMIN — SODIUM CHLORIDE: 9 INJECTION, SOLUTION INTRAVENOUS at 09:49

## 2022-08-29 RX ADMIN — NYSTATIN: 100000 CREAM TOPICAL at 02:31

## 2022-08-29 RX ADMIN — INSULIN GLARGINE 50 UNITS: 100 INJECTION, SOLUTION SUBCUTANEOUS at 02:31

## 2022-08-29 RX ADMIN — SODIUM CHLORIDE, PRESERVATIVE FREE 10 ML: 5 INJECTION INTRAVENOUS at 08:55

## 2022-08-29 RX ADMIN — KETOROLAC TROMETHAMINE 30 MG: 30 INJECTION, SOLUTION INTRAMUSCULAR; INTRAVENOUS at 18:49

## 2022-08-29 RX ADMIN — ACETAMINOPHEN 650 MG: 325 TABLET ORAL at 15:37

## 2022-08-29 RX ADMIN — PERFLUTREN 2.2 MG: 6.52 INJECTION, SUSPENSION INTRAVENOUS at 12:21

## 2022-08-29 RX ADMIN — EZETIMIBE 10 MG: 10 TABLET ORAL at 21:02

## 2022-08-29 RX ADMIN — ROSUVASTATIN CALCIUM 20 MG: 20 TABLET, FILM COATED ORAL at 21:02

## 2022-08-29 RX ADMIN — BUSPIRONE HYDROCHLORIDE 15 MG: 15 TABLET ORAL at 08:55

## 2022-08-29 RX ADMIN — IOPAMIDOL 80 ML: 755 INJECTION, SOLUTION INTRAVENOUS at 16:49

## 2022-08-29 RX ADMIN — LORAZEPAM 1 MG: 1 TABLET ORAL at 16:16

## 2022-08-29 ASSESSMENT — PAIN DESCRIPTION - DESCRIPTORS
DESCRIPTORS: ACHING
DESCRIPTORS: OTHER (COMMENT)
DESCRIPTORS: ACHING
DESCRIPTORS: ACHING
DESCRIPTORS: OTHER (COMMENT)

## 2022-08-29 ASSESSMENT — PAIN DESCRIPTION - LOCATION
LOCATION: HEAD
LOCATION: HEAD
LOCATION: BACK

## 2022-08-29 ASSESSMENT — PAIN SCALES - WONG BAKER
WONGBAKER_NUMERICALRESPONSE: 4

## 2022-08-29 ASSESSMENT — PAIN SCALES - GENERAL
PAINLEVEL_OUTOF10: 0
PAINLEVEL_OUTOF10: 6
PAINLEVEL_OUTOF10: 3
PAINLEVEL_OUTOF10: 4
PAINLEVEL_OUTOF10: 3
PAINLEVEL_OUTOF10: 2
PAINLEVEL_OUTOF10: 2
PAINLEVEL_OUTOF10: 0

## 2022-08-29 ASSESSMENT — PAIN DESCRIPTION - ORIENTATION
ORIENTATION: OTHER (COMMENT)
ORIENTATION: OTHER (COMMENT)

## 2022-08-29 ASSESSMENT — PAIN - FUNCTIONAL ASSESSMENT
PAIN_FUNCTIONAL_ASSESSMENT: ACTIVITIES ARE NOT PREVENTED
PAIN_FUNCTIONAL_ASSESSMENT: ACTIVITIES ARE NOT PREVENTED

## 2022-08-29 NOTE — ED NOTES
Pt called out stating that pain is getting worse. C/o pain @ 4/10 to center of chest radiating to back with inspiration. States she was laying on her side & the pain started getting worse. Pt sitting on side of bed stating that the pain is starting to ease up.  Pt states \"it feels just like pleurisy\"      Isac Dillon RN  08/28/22 8807

## 2022-08-29 NOTE — ED NOTES
Pt states she took 2 baby ASA @ home, & then 2 baby ASA on squad.       Amari Mckeon RN  08/28/22 0194

## 2022-08-29 NOTE — CONSULTS
[address] [place] [address], [address] [facility]                                  CONSULTATION    PATIENT NAME: Claude Clement                  :        1960  MED REC NO:   [id number]                          ROOM:       [id number]  ACCOUNT NO:   [de-identified]                           ADMIT DATE: 2022  PROVIDER:     Joycelyn Jacobo MD    CONSULT DATE:  2022    INDICATION:  Chest pain. HISTORY OF PRESENT ILLNESS:  This is a 82-year-old female patient who  fell about three weeks ago. She comes in yesterday with having chest  pain present. Pain is radiating across _____ to the back of the  shoulder blades also. She had pleuritic kind of pain also present. First troponin is negative. Second troponin is slightly elevated. No  fevers, no chills. No cough production. No other  or GI complaints. The patient had a heart catheterization done in 2016 in [facility],  mild CAD was noted. Cardiologist is Dr. Brian Charlton. PAST MEDICAL HISTORY:  History of having hypertension, hyperlipidemia,  history of breast cancer, had a lumpectomy done left-sided, had  radiation and chemotherapy also. She has diabetes present. She has a  history of loop recorder placement for neurocardiogenic syncope. PAST SURGICAL HISTORY:  Lumpectomy done, heart catheterization in 2016,  mild CAD noted. SOCIAL HISTORY:  Does not smoke. Does not drink. ALLERGIES:  TAPE. HOME MEDICATIONS:  She is on Lopressor 25 b.i.d., Lamisil, BuSpar,  tetracycline, Prilosec, Jardiance, Zetia, Crestor, Vascepa, Actos, see  the rest of the list.    PHYSICAL EXAMINATION:  GENERAL:  The patient is awake, alert, and answering questions, not in  acute distress. VITAL SIGNS:  Temperature afebrile. Pulse is 76. Blood pressure is  121/59. HEENT:  Head is normocephalic and atraumatic. Pupils are equal and  reactive. CHEST:  Equal expansion.   LUNGS:  Clear to auscultation. No wheezing or rhonchi appreciated. HEART:  Regular rhythm. ABDOMEN:  Soft and nontender. Bowel sounds are present. No  hepatosplenomegaly or guarding appreciated. EXTREMITIES:  No cyanosis or clubbing noted. NEUROLOGIC:  Cranial nerves II through II are grossly intact. LABORATORY DATA:  BUN is 14, creatinine 0.6. Troponin is slightly  elevated. LFTs are normal.  CBC is within normal range. IMPRESSION:  This is a 45-year-old female patient who comes in with  chest pain. Troponin is slightly elevated. Pain is non-reproducible. She has multiple risk factors for coronary artery disease present. In  light of this thing, I will see the third troponin to make further  recommendations. _____ pericarditis versus CAD. If this troponin is  elevated, we will plan for heart cath. We will get a CT chest also and an echo has been pending. We will make  further recommendations based on hospital course.         Amy Swain MD    D: 08/29/2022 8:44:47       T: 08/29/2022 14:16:55     [email or web address]  Job#: [id number]     Doc#: [id number]    CC:

## 2022-08-29 NOTE — ED TRIAGE NOTES
Pt states she fell about 3 weeks ago & feels like she may have broke a couple ribs.  States she also has a hx of pleurisy & this chest pain feels like pleurisy in the past. Pt states SOB & CP started about 2 days ago

## 2022-08-29 NOTE — PLAN OF CARE
Problem: Discharge Planning  Goal: Discharge to home or other facility with appropriate resources  8/29/2022 1632 by Tushar Granado LPN  Outcome: Progressing  Flowsheets (Taken 8/29/2022 1630)  Discharge to home or other facility with appropriate resources: Identify barriers to discharge with patient and caregiver  8/29/2022 0315 by Tito Escalona RN  Outcome: Progressing     Problem: Safety - Adult  Goal: Free from fall injury  8/29/2022 1632 by Tushar Granado LPN  Outcome: Progressing  Flowsheets (Taken 8/29/2022 1631)  Free From Fall Injury: Instruct family/caregiver on patient safety  8/29/2022 0315 by Tito Escalona RN  Outcome: Progressing     Problem: Pain  Goal: Verbalizes/displays adequate comfort level or baseline comfort level  Outcome: Progressing

## 2022-08-29 NOTE — ED NOTES
Jose Small, [address] @      Mikayla Duarte, [address] [place] [address]  08/28/22 2056

## 2022-08-29 NOTE — PROGRESS NOTES
Patient admitted to unit, alert and oriented x4, skin assessment completed with redness noted under right breast, verified by RN Teresa.  Bed in low position, call light and items within reach

## 2022-08-29 NOTE — CARE COORDINATION
Chart reviewed. Pt from home, independent PTA. Pt has PCP and insurance. No DC needs identified at this time. CM available should any new needs arise.

## 2022-08-29 NOTE — ED NOTES
reddness noted under right breast. Pt states hurts & itches.       Sukhwinder Shah RN  08/28/22 6214

## 2022-08-29 NOTE — ED NOTES
Report called to Geovanny Mahoney RN. All questions answered.       Preston Vargas RN  08/29/22 5960

## 2022-08-29 NOTE — ED PROVIDER NOTES
Triage Chief Complaint:   Chest Pain (Took norco and asa around 1945) and Nausea    Inaja:  Today in the ED I had the pleasure of caring for Gwendolyn Austin who is a 63 y.o. female that presents to the emergency department. Complaining of chest pain. Patient states that around 2 weeks ago she fell and hit her ribs on a hard surface since then has had left-sided rib pain. Then over the last 1 week she has been having intermittent midsternal pain feels like someone kicked her in the chest.  Admits achy. It seems to wax and wane. However the last 2 days has began to be much worse. Which concerned her so she came in for evaluation she has no history of coronary artery disease but does has a history of hypertension hyperlipidemia diabetes, BMI greater than 27, age greater than 61, family medical history of coronary artery disease. Last stress test was many years ago. She was scheduled for stress test this past year. However missed it and never rescheduled. ROS:  REVIEW OF SYSTEMS    At least 10 systems reviewed      All other review of systems are negative  See HPI and nursing notes for additional information       Past Medical History:   Diagnosis Date    Asthma     Cancer ([address]     left breast, precancerous on right breast.    Diabetes mellitus ([address]     Fibromyalgia     History of loop recorder     Hyperlipidemia     Hypertension     Neurocardiogenic syncope     Type II or unspecified type diabetes mellitus without mention of complication, not stated as uncontrolled      Past Surgical History:   Procedure Laterality Date    BREAST SURGERY Left     lumpectomy, right breast biopsy. lymph nodes removed    CARDIAC CATHETERIZATION      no stents.      SECTION      CHOLECYSTECTOMY      COLONOSCOPY      COLONOSCOPY N/A 3/29/2022    COLONOSCOPY POLYPECTOMY SNARE/COLD BIOPSY performed by Mary Wolff MD at [address] [place] [address] Dr Right downs    ENDOSCOPY, COLON, DIAGNOSTIC      INSERTABLE CARDIAC MONITOR      KNEE ARTHROSCOPY Bilateral     PORT SURGERY      port placed and removed.     ROTATOR CUFF REPAIR Right     UPPER GASTROINTESTINAL ENDOSCOPY N/A 3/29/2022    EGD BIOPSY performed by Adia Diop MD at [facility]     Family History   Problem Relation Age of Onset    Cancer Mother      Social History     Socioeconomic History    Marital status:      Spouse name: Not on file    Number of children: Not on file    Years of education: Not on file    Highest education level: Not on file   Occupational History    Not on file   Tobacco Use    Smoking status: Never    Smokeless tobacco: Never   Vaping Use    Vaping Use: Never used   Substance and Sexual Activity    Alcohol use: No    Drug use: Not on file    Sexual activity: Not on file   Other Topics Concern    Not on file   Social History Narrative    Not on file     Social Determinants of Health     Financial Resource Strain: Not on file   Food Insecurity: Not on file   Transportation Needs: Not on file   Physical Activity: Not on file   Stress: Not on file   Social Connections: Not on file   Intimate Partner Violence: Not on file   Housing Stability: Not on file     Current Facility-Administered Medications   Medication Dose Route Frequency Provider Last Rate Last Admin    aspirin chewable tablet 324 mg  324 mg Oral Once Cy Daley PA-C         Current Outpatient Medications   Medication Sig Dispense Refill    metoprolol tartrate (LOPRESSOR) 25 MG tablet       terbinafine (LAMISIL) 250 MG tablet       busPIRone (BUSPAR) 15 MG tablet Take 15 mg by mouth 3 times daily 90 tablet 2    tetracycline (ACHROMYCIN;SUMYCIN) 500 MG capsule Take 1 capsule by mouth 4 times daily 56 capsule 0    omeprazole (PRILOSEC) 40 MG delayed release capsule Take 1 capsule by mouth 2 times daily (before meals) for 14 days 28 capsule 0    JARDIANCE 25 MG tablet TAKE 1 TABLET BY MOUTH ONCE DAILY IN THE MORNING      ezetimibe (ZETIA) 10 MG tablet TAKE 1 TABLET BY MOUTH ONCE DAILY FOR 90 DAYS      omeprazole (PRILOSEC) 40 MG delayed release capsule TAKE 1 CAPSULE BY MOUTH ONCE DAILY BEFORE A MEAL      rosuvastatin (CRESTOR) 20 MG tablet       ondansetron (ZOFRAN) 4 MG tablet Take 1 tablet by mouth daily as needed for Nausea or Vomiting 30 tablet 2    lidocaine-prilocaine (EMLA) 2.5-2.5 % cream Apply topically AS NEED 1 Tube 0    Icosapent Ethyl (VASCEPA PO) Take 2 tablets by mouth 2 times daily Takes 10mg - taking 2 tabs bid      pioglitazone (ACTOS) 15 MG tablet       escitalopram (LEXAPRO) 20 MG tablet Take 20 mg by mouth      b complex vitamins capsule Take 1 capsule by mouth daily      melatonin 3 MG TABS tablet Take 3 mg by mouth nightly      gabapentin (NEURONTIN) 600 MG tablet 600 mg every other day. metFORMIN ER (GLUCOPHAGE-XR) 500 MG XR tablet 500 mg nightly       aspirin 81 MG tablet Take 81 mg by mouth daily      butalbital-acetaminophen-caffeine (FIORICET) -40 MG per tablet Take 1 tablet by mouth every 4 hours as needed for Pain 60 tablet 0    LEVEMIR FLEXTOUCH 100 UNIT/ML injection pen Inject 100 Units into the skin daily      NOVOFINE 32G X 6 MM MISC       ONE TOUCH ULTRA TEST strip        Allergies   Allergen Reactions    Tape Vanessa Pong Tape]        Nursing Notes Reviewed    Physical Exam:  ED Triage Vitals   Enc Vitals Group      BP 08/28/22 2031 90/75      Heart Rate 08/28/22 2035 91      Resp 08/28/22 2042 11      Temp 08/28/22 2035 98.3 °F (36.8 °C)      Temp Source 08/28/22 2035 Oral      SpO2 08/28/22 2031 95 %      Weight 08/28/22 2035 258 lb (117 kg)      Height 08/28/22 2035 5' 10\" (1.778 m)      Head Circumference --       Peak Flow --       Pain Score --       Pain Loc --       Pain Edu? --       Excl.  in [address]? --      General :Patient is awake alert oriented person place and time no acute distress nontoxic appearing  HEENT: Pupils are equally round and reactive to light extraocular motors are intact conjunctivae clear sclerae white there 28.5 27 - 31 PG    MCHC 32.6 32.0 - 36.0 %    RDW 13.2 11.7 - 14.9 %    Platelets 406 214 - 896 K/CU MM    MPV 10.0 7.5 - 11.1 FL    Differential Type AUTOMATED DIFFERENTIAL     Segs Relative 46.9 36 - 66 %    Lymphocytes % 42.9 24 - 44 %    Monocytes % 7.0 (H) 0 - 4 %    Eosinophils % 2.1 0 - 3 %    Basophils % 0.7 0 - 1 %    Segs Absolute 5.3 K/CU MM    Lymphocytes Absolute 4.8 K/CU MM    Monocytes Absolute 0.8 K/CU MM    Eosinophils Absolute 0.2 K/CU MM    Basophils Absolute 0.1 K/CU MM    Nucleated RBC % 0.0 %    Total Nucleated RBC 0.0 K/CU MM    Total Immature Neutrophil 0.04 K/CU MM    Immature Neutrophil % 0.4 0 - 0.43 %   Comprehensive Metabolic Panel   Result Value Ref Range    Sodium 140 135 - 145 MMOL/L    Potassium 4.3 3.5 - 5.1 MMOL/L    Chloride 99 99 - 110 mMol/L    CO2 24 21 - 32 MMOL/L    BUN 14 6 - 23 MG/DL    Creatinine 0.6 0.6 - 1.1 MG/DL    Glucose 249 (H) 70 - 99 MG/DL    Calcium 9.5 8.3 - 10.6 MG/DL    Albumin 4.1 3.4 - 5.0 GM/DL    Total Protein 7.4 6.4 - 8.2 GM/DL    Total Bilirubin 0.2 0.0 - 1.0 MG/DL    ALT 17 10 - 40 U/L    AST 17 15 - 37 IU/L    Alkaline Phosphatase 124 40 - 129 IU/L    GFR Non-African American >60 >60 mL/min/1.73m2    GFR African American >60 >60 mL/min/1.73m2    Anion Gap 17 (H) 4 - 16   Troponin   Result Value Ref Range    Troponin T <0.010 <0.01 NG/ML   Lipase   Result Value Ref Range    Lipase 69 (H) 13 - 60 IU/L   Brain Natriuretic Peptide   Result Value Ref Range    Pro-.4 <300 PG/ML      Radiographs (if obtained):  [] The following radiograph was interpreted by myself in the absence of a radiologist:   [] Radiologist's Report Reviewed:  XR CHEST PORTABLE   Final Result   1. No acute cardiopulmonary process identified. EKG (if obtained):   Please See Note of attending physician for EKG interpretation. Chart review shows recent radiograph(s):  No results found.     MDM:     Interventions given this visit:   Orders Placed This Encounter Medications    aspirin chewable tablet 324 mg     Patient presents with chest pain. Patient has a heart score >3. Has continued pain here. Initial cardiac workup is negative including negative troponin, cxr (per radiologist interpretation) ekg (per attending physician interpretation) will require admission for chest pain rule out. Blood count and metabolic panel reveal no acuity to account for pt symptoms. On-call physician  Was consulted regarding patient. After thorough discussion regarding patient's history, physical exam.  laboratory values, radiographic evidence (if applicable  theymyself as well as my attending physician agreed Given the patient's presenting concerns, medical history and clinical findings, the patient will be admitted at this time to undergo further evaluation and disposition. . During patient's entire stay in the ED patient remained stable and comfortable. Analgesia is well-controlled. Patient will be admitted for all information regarding ongoing management and care of patient please see note of Admitting physician. All EKG interpretations are performed by Attending physician      All EKG interpretations are performed by Attending physician            I independently managed patient today in the ED. /68   Pulse 95   Temp 98.3 °F (36.8 °C) (Oral)   Resp 24   Ht 5' 10\" (1.778 m)   Wt 258 lb (117 kg)   SpO2 93%   BMI 37.02 kg/m²       Clinical Impression:  1. Chest pain, unspecified type        Disposition referral (if applicable):  No follow-up provider specified. Disposition medications (if applicable):  New Prescriptions    No medications on file         Comment: Please note this report has been produced using speech recognition software and may contain errors related to that system including errors in grammar, punctuation, and spelling, as well as words and phrases that may be inappropriate.  If there are any questions or concerns please feel free to contact the dictating provider for clarification.       Michael Leon, [address] [place] [address]  08/28/22 6118

## 2022-08-29 NOTE — PROGRESS NOTES
severe \"I thought I was dying\". Somewhat unrelieved with medication provided in the ER. She does report a history of neurogenic syncope with a hospitalization approximately 2 weeks in 2017 for evaluation. Ten point ROS reviewed negative, unless as noted above    Objective:   No intake or output data in the 24 hours ending 08/29/22 1216   Vitals:   Vitals:    08/29/22 0200 08/29/22 0625 08/29/22 0845 08/29/22 0846   BP: 134/63 (!) 121/59 127/68    Pulse: 89 76  (!) 117   Resp: 18  18    Temp: 98 °F (36.7 °C) 97.7 °F (36.5 °C) 97.8 °F (36.6 °C)    TempSrc: Oral  Oral    SpO2: 95%  95%    Weight:       Height:         Physical Exam: 08/29/22     GEN -Awake nontoxic appearing female, sitting upright in bed , NAD. Obese body habitus. Appears given age. EYES -Pupils are equally round. No scleral erythema, discharge, or conjunctivitis. HENT -MM are moist. Oral pharynx without exudates, no evidence of thrush. NECK -Supple, no apparent thyromegaly or masses. RESP -CTA, no wheezes, rales or rhonchi. Symmetric chest movement while on room air. C/V -S1/S2 auscultated. RRR without appreciable M/R/G. No JVD or carotid bruits. Peripheral pulses equal bilaterally and palpable. No peripheral edema. GI -Abdomen is soft non distended and without significant tenderness. No masses or guarding. + BS Rectal exam deferred.  -No CVA tenderness. Gutierrez catheter is not present. LYMPH-No palpable cervical lymphadenopathy and no hepatosplenomegaly. No petechiae or ecchymoses. MS -No gross joint deformities. SKIN -Normal coloration, warm, dry. Left inframammary ecchymotic area. Right inframammary skin fold cutaneous candidiasis  NEURO-Cranial nerves appear grossly intact, normal speech, no lateralizing weakness. PSYC-Awake, alert, oriented x 4. Appropriate affect.     Medications:   Medications:    lidocaine  1 patch TransDERmal Daily    aspirin  81 mg Oral Daily    busPIRone  15 mg Oral TID    escitalopram  20 mg Oral Daily    ezetimibe  10 mg Oral Nightly    melatonin  3 mg Oral Nightly    metoprolol tartrate  25 mg Oral BID    pantoprazole  40 mg Oral QAM AC    rosuvastatin  20 mg Oral Nightly    sodium chloride flush  5-40 mL IntraVENous 2 times per day    [START ON 8/30/2022] enoxaparin  30 mg SubCUTAneous BID    insulin glargine  50 Units SubCUTAneous QPM    insulin lispro  0-8 Units SubCUTAneous TID WC    insulin lispro  0-4 Units SubCUTAneous QPM    insulin lispro  15 Units SubCUTAneous TID WC    miconazole   Topical BID    LORazepam  1 mg Oral Once    perflutren lipid microspheres        aspirin  324 mg Oral Once      Infusions:    sodium chloride      dextrose      sodium chloride 100 mL/hr at 08/29/22 0949     PRN Meds: butalbital-acetaminophen-caffeine, 1 tablet, Q6H PRN  gabapentin, 600 mg, Daily PRN  sodium chloride flush, 5-40 mL, PRN  sodium chloride, , PRN  ondansetron, 4 mg, Q8H PRN   Or  ondansetron, 4 mg, Q6H PRN  polyethylene glycol, 17 g, Daily PRN  aluminum & magnesium hydroxide-simethicone, 30 mL, Q6H PRN  acetaminophen, 650 mg, Q6H PRN   Or  acetaminophen, 650 mg, Q6H PRN  glucose, 4 tablet, PRN  dextrose bolus, 125 mL, PRN   Or  dextrose bolus, 250 mL, PRN  glucagon (rDNA), 1 mg, PRN  dextrose, , Continuous PRN  ketorolac, 30 mg, Q6H PRN        LABS  CBC   Recent Labs     08/28/22 2023   WBC 11.2*   HGB 13.8   HCT 42.3         RENAL  Recent Labs     08/28/22 2023      K 4.3   CL 99   CO2 24   BUN 14   CREATININE 0.6     LFT'S  Recent Labs     08/28/22 2023   AST 17   ALT 17   BILITOT 0.2   ALKPHOS 124     COAG  Recent Labs     08/29/22  0907   INR 0.91     CARDIAC ENZYMES  Recent Labs     08/29/22  0014   CKTOTAL 44         Radiology this visit:  Reviewed.     XR CHEST PORTABLE    Result Date: 8/28/2022  EXAMINATION: ONE XRAY VIEW OF THE CHEST 8/28/2022 9:16 pm COMPARISON: Chest x-ray November 16, 2016; CT chest November 16, 2016 HISTORY: ORDERING SYSTEM PROVIDED HISTORY: chest pain TECHNOLOGIST PROVIDED HISTORY: Reason for exam:->chest pain Reason for Exam: chest pain Additional signs and symptoms: SOB Relevant Medical/Surgical History: asthma FINDINGS: Cardiac silhouette is stable. No focal consolidation, pleural effusion, or pneumothorax. Cardiac loop recorder in place. Osseous structures are intact. 1. No acute cardiopulmonary process identified.              Electronically signed by EDITA Yanes CNP on 8/29/2022 at 12:16 PM

## 2022-08-29 NOTE — H&P
History and Physical      Name:  Robert Bull /Age/Sex: 1960  (63 y.o. female)   MRN & CSN:  [id number] & [id number] Encounter Date/Time: 2022 11:01 PM EDT   Location:  ED29/ED-29 PCP: Wei Mendes MD       Hospital Day: 1    Assessment and Plan:     Patient is a 72-year-old female who presented with chest pain. # Chest pain  # NSTEMI  - Substernal CP x2, intermittent, lasting 10-15 mins with mild SOB. Unable to comment relationship to rest / exertion.   - Sternum is very tender to palpation. Initially Tn negative, repeat mildly elevated. ECG without acute ischemic changes. CXR negative for acute cardiopulmonary processes. - Repeat Tn. Telemetry.  - Has T2DM, HTN, HLD and obesity.  - Continue home Lopressor, ASA and Crestor.  - Cardiology consulted, follow-up. # Left-sided chest pain s/p fall  - Slid off bed 3 weeks ago and hit bed board. - CXR negative for fractures. - Lidocaine prn. # T2DM  - Last A1c 9.7% in 2015, repeat pending.  - Held Actos and Metformin.  - Decreased home Insulin to Lantus 50 u qhs and Lispro 15 u TIDWM with LCSI. # Class II obesity  - BMI 37.  - Advised on importance of lifestyle modifications. Checklist:  Advanced directive: full  Catheter: none  DVT ppx: Lovenox  Nutrition/Diet: cardiac  Sugar: BG goal of 140-180 while inpatient    Disposition: patient requires continued admission due to chest pain. MDM: moderate. History of Present Illness:     Chief Complaint: Chest pain    Patient is a 72-year-old female with a PMHx of HTN, HLD, T2DM, fibromyalgia and class II obesity who presented to the ED with chest pain for 2 days. Reported that the chest pain is substernal, intermittent, lasting 10-15 mins, unable to say if related to rest/exertion because she has not moved really exerted herself recently. The symptoms are also associated with mild SOB, but no no palpitations/presyncope. Had NBNB emesis x1 once.   Denied any abdominal pain, C/D, no dysuria. Denied any tobacco or alcohol use. Patient had a fall about 3 weeks ago from the edge of the bed on bed board and developed left-sided chest pain, however, stated that this chest pain is different than the one from the fall. ROS:     Ten point ROS reviewed negative, unless as noted above. Objective:     No intake or output data in the 24 hours ending 22 2301     Vitals:   Vitals:    22 2102 22 2147 22 2202 22 2236   BP: 113/68  108/65 110/68   Pulse: 96 95 89 95   Resp: 18 24 18 15   Temp:       TempSrc:       SpO2: 93% 93% 94% 96%   Weight:       Height:         BMI: Body mass index is 37.02 kg/m². General: Awake. AAOx3. NAD. Obese. HEENT: NCAT. PERRLA. Vision grossly intact. Hearing grossly intact. Oropharynx clear. MMM. Neck: Supple. No JVP/JVD. CV: RRR. NL S1/S2. No M/R/G. CR <2 secs. No peripheral edema. Pulm: NL effort on RA. CTAB. No R/R/W. CW very tender to palpation over sternum and left chest wall. GI: +BS x4. Soft. NT/ND. : No CVA or suprapubic tenderness. No Gutierrez catheter. Ext: No obvious deformities. Peripheral pulses intact. Skin: Intact. Normal coloration, warm, dry. MSK: No gross joint deformities. Full ROM. Neuro: CNs grossly intact. Normal speech. No focal deficit. Psych: Good judgement and reason. Anxious. Past History: PMHx:   Past Medical History:   Diagnosis Date    Asthma     Cancer ([address]     left breast, precancerous on right breast.    Diabetes mellitus ([address]     Fibromyalgia     History of loop recorder     Hyperlipidemia     Hypertension     Neurocardiogenic syncope     Type II or unspecified type diabetes mellitus without mention of complication, not stated as uncontrolled        PSHx:   Past Surgical History:   Procedure Laterality Date    BREAST SURGERY Left     lumpectomy, right breast biopsy. lymph nodes removed    CARDIAC CATHETERIZATION      no stents.      SECTION      CHOLECYSTECTOMY      COLONOSCOPY      COLONOSCOPY N/A 3/29/2022    COLONOSCOPY POLYPECTOMY SNARE/COLD BIOPSY performed by Dulce Hoskins MD at [address] Right     biceps    ENDOSCOPY, COLON, DIAGNOSTIC      INSERTABLE CARDIAC MONITOR      KNEE ARTHROSCOPY Bilateral     PORT SURGERY      port placed and removed.  ROTATOR CUFF REPAIR Right     UPPER GASTROINTESTINAL ENDOSCOPY N/A 3/29/2022    EGD BIOPSY performed by Dulce Hoskins MD at [place] [id number]       Allergies: Allergies   Allergen Reactions    Tape [Adhesive Tape]        FHx: family history includes Cancer in her mother. SHx:   Social History     Socioeconomic History    Marital status:    Tobacco Use    Smoking status: Never    Smokeless tobacco: Never   Vaping Use    Vaping Use: Never used   Substance and Sexual Activity    Alcohol use: No       Medications Prior to Admission     Prior to Admission medications    Medication Sig Start Date End Date Taking?  Authorizing Provider   metoprolol tartrate (LOPRESSOR) 25 MG tablet  6/8/22   Historical Provider, MD   terbinafine (LAMISIL) 250 MG tablet  6/8/22   Historical Provider, MD   busPIRone (BUSPAR) 15 MG tablet Take 15 mg by mouth 3 times daily 4/7/22   [facility], APRN - CNP   tetracycline (ACHROMYCIN;SUMYCIN) 500 MG capsule Take 1 capsule by mouth 4 times daily 4/7/22   [facility], APRN - CNP   omeprazole (PRILOSEC) 40 MG delayed release capsule Take 1 capsule by mouth 2 times daily (before meals) for 14 days 4/7/22 4/21/22  [facility], APRN - CNP   JARDIANCE 25 MG tablet TAKE 1 TABLET BY MOUTH ONCE DAILY IN THE MORNING 2/10/22   Historical Provider, MD   ezetimibe (ZETIA) 10 MG tablet TAKE 1 TABLET BY MOUTH ONCE DAILY FOR 90 DAYS 2/15/22   Historical Provider, MD   omeprazole (PRILOSEC) 40 MG delayed release capsule TAKE 1 CAPSULE BY MOUTH ONCE DAILY BEFORE A MEAL 12/10/21   Historical Provider, MD   rosuvastatin (CRESTOR) 20 MG tablet  3/2/22   Historical Provider, MD   ondansetron (ZOFRAN) 4 MG tablet Take 1 tablet by mouth daily as needed for Nausea or Vomiting 3/3/22   Sherrlyn Olszewski, APRN - CNP   lidocaine-prilocaine (EMLA) 2.5-2.5 % cream Apply topically AS NEED 3/31/21   Gabriella Pichardo MD   Icosapent Ethyl (VASCEPA PO) Take 2 tablets by mouth 2 times daily Takes 10mg - taking 2 tabs bid    Historical Provider, MD   pioglitazone (ACTOS) 15 MG tablet  3/27/18   Historical Provider, MD   escitalopram (LEXAPRO) 20 MG tablet Take 20 mg by mouth 8/29/17 4/7/22  Historical Provider, MD   b complex vitamins capsule Take 1 capsule by mouth daily    Historical Provider, MD   melatonin 3 MG TABS tablet Take 3 mg by mouth nightly    Historical Provider, MD   gabapentin (NEURONTIN) 600 MG tablet 600 mg every other day.  9/15/15   Historical Provider, MD   metFORMIN ER (GLUCOPHAGE-XR) 500 MG XR tablet 500 mg nightly  8/15/15   Historical Provider, MD   aspirin 81 MG tablet Take 81 mg by mouth daily    Historical Provider, MD   butalbital-acetaminophen-caffeine (FIORICET) -40 MG per tablet Take 1 tablet by mouth every 4 hours as needed for Pain 5/10/15   MAGGIE Fisher   LEVEMIR FLEXTOUCH 100 UNIT/ML injection pen Inject 100 Units into the skin daily 9/4/14   Historical Provider, MD   NOVOFINE 32G X 6 MM MISC  8/6/14   Historical Provider, MD   ONE TOUCH ULTRA TEST strip  8/25/14   Historical Provider, MD       Data:     CBC:   Recent Labs     08/28/22 2023   WBC 11.2*   HGB 13.8      MCV 87.2   RDW 13.2   LYMPHOPCT 42.9   MONOPCT 7.0*   BASOPCT 0.7   MONOSABS 0.8   LYMPHSABS 4.8   EOSABS 0.2   BASOSABS 0.1     CMP:    Recent Labs     08/28/22 2023      K 4.3   CL 99   CO2 24   BUN 14   CREATININE 0.6   GFRAA >60   GLUCOSE 249*   LABALBU 4.1   CALCIUM 9.5   BILITOT 0.2   ALKPHOS 124   AST 17   ALT 17     Lipids: No results found for: CHOL, HDL, TRIG  Hemoglobin A1C:   Lab Results   Component Value Date/Time    LABA1C 9.7 12/14/2015 02:00 PM     TSH: No results found for: TSH  Troponin:   Lab Results   Component Value Date/Time    TROPONINT <0.010 08/28/2022 08:23 PM    TROPONINT <0.010 11/16/2016 09:10 PM    TROPONINT <0.010 11/16/2016 04:57 PM     BNP:   Recent Labs     08/28/22 2023   PROBNP 276.4     Lactic Acid: No results for input(s): LACTA in the last 72 hours. UA:No results found for: Bonita Landrum, PHUR, LABCAST, WBCUA, RBCUA, MUCUS, TRICHOMONAS, YEAST, BACTERIA, CLARITYU, SPECGRAV, LEUKOCYTESUR, UROBILINOGEN, BILIRUBINUR, BLOODU, GLUCOSEU, KETUA, AMORPHOUS  Urine Cultures: No results found for: LABURIN  Blood Cultures: No results found for: BC  No results found for: BLOODCULT2  Organism: No results found for: [facility]    Radiology results:  XR CHEST PORTABLE   Final Result   1. No acute cardiopulmonary process identified.              Medications:     Medications:    aspirin  324 mg Oral Once        Infusions:       PRN Meds:        Glenny Weiner MD  08/28/22 11:01 PM

## 2022-08-30 ENCOUNTER — APPOINTMENT (OUTPATIENT)
Dept: NUCLEAR MEDICINE | Age: 62
DRG: 247 | End: 2022-08-30
Payer: COMMERCIAL

## 2022-08-30 PROBLEM — I21.4 NSTEMI (NON-ST ELEVATED MYOCARDIAL INFARCTION) (HCC): Status: ACTIVE | Noted: 2022-08-30

## 2022-08-30 LAB
ACTIVATED CLOTTING TIME, LOW RANGE: 244 SEC
ACTIVATED CLOTTING TIME, LOW RANGE: 282 SEC
EKG ATRIAL RATE: 65 BPM
EKG DIAGNOSIS: NORMAL
EKG P AXIS: 62 DEGREES
EKG P-R INTERVAL: 164 MS
EKG Q-T INTERVAL: 448 MS
EKG QRS DURATION: 88 MS
EKG QTC CALCULATION (BAZETT): 465 MS
EKG R AXIS: 15 DEGREES
EKG T AXIS: 6 DEGREES
EKG VENTRICULAR RATE: 65 BPM
GLUCOSE BLD-MCNC: 147 MG/DL (ref 70–99)
GLUCOSE BLD-MCNC: 153 MG/DL (ref 70–99)
GLUCOSE BLD-MCNC: 176 MG/DL (ref 70–99)
LV EF: 41 %
LVEF MODALITY: NORMAL

## 2022-08-30 PROCEDURE — 6370000000 HC RX 637 (ALT 250 FOR IP): Performed by: STUDENT IN AN ORGANIZED HEALTH CARE EDUCATION/TRAINING PROGRAM

## 2022-08-30 PROCEDURE — G0378 HOSPITAL OBSERVATION PER HR: HCPCS

## 2022-08-30 PROCEDURE — 6360000002 HC RX W HCPCS

## 2022-08-30 PROCEDURE — 2140000000 HC CCU INTERMEDIATE R&B

## 2022-08-30 PROCEDURE — C1887 CATHETER, GUIDING: HCPCS

## 2022-08-30 PROCEDURE — 6370000000 HC RX 637 (ALT 250 FOR IP): Performed by: INTERNAL MEDICINE

## 2022-08-30 PROCEDURE — 94761 N-INVAS EAR/PLS OXIMETRY MLT: CPT

## 2022-08-30 PROCEDURE — C1874 STENT, COATED/COV W/DEL SYS: HCPCS

## 2022-08-30 PROCEDURE — B2111ZZ FLUOROSCOPY OF MULTIPLE CORONARY ARTERIES USING LOW OSMOLAR CONTRAST: ICD-10-PCS | Performed by: INTERNAL MEDICINE

## 2022-08-30 PROCEDURE — 2500000003 HC RX 250 WO HCPCS: Performed by: INTERNAL MEDICINE

## 2022-08-30 PROCEDURE — 93005 ELECTROCARDIOGRAM TRACING: CPT | Performed by: INTERNAL MEDICINE

## 2022-08-30 PROCEDURE — 3430000000 HC RX DIAGNOSTIC RADIOPHARMACEUTICAL: Performed by: INTERNAL MEDICINE

## 2022-08-30 PROCEDURE — 93458 L HRT ARTERY/VENTRICLE ANGIO: CPT

## 2022-08-30 PROCEDURE — 82962 GLUCOSE BLOOD TEST: CPT

## 2022-08-30 PROCEDURE — 6370000000 HC RX 637 (ALT 250 FOR IP): Performed by: SURGERY

## 2022-08-30 PROCEDURE — 6360000002 HC RX W HCPCS: Performed by: STUDENT IN AN ORGANIZED HEALTH CARE EDUCATION/TRAINING PROGRAM

## 2022-08-30 PROCEDURE — 2500000003 HC RX 250 WO HCPCS

## 2022-08-30 PROCEDURE — 93017 CV STRESS TEST TRACING ONLY: CPT

## 2022-08-30 PROCEDURE — 93010 ELECTROCARDIOGRAM REPORT: CPT | Performed by: INTERNAL MEDICINE

## 2022-08-30 PROCEDURE — 4A023N7 MEASUREMENT OF CARDIAC SAMPLING AND PRESSURE, LEFT HEART, PERCUTANEOUS APPROACH: ICD-10-PCS | Performed by: INTERNAL MEDICINE

## 2022-08-30 PROCEDURE — 2580000003 HC RX 258: Performed by: NURSE PRACTITIONER

## 2022-08-30 PROCEDURE — 6370000000 HC RX 637 (ALT 250 FOR IP)

## 2022-08-30 PROCEDURE — 6360000004 HC RX CONTRAST MEDICATION

## 2022-08-30 PROCEDURE — 93005 ELECTROCARDIOGRAM TRACING: CPT | Performed by: NURSE PRACTITIONER

## 2022-08-30 PROCEDURE — C1769 GUIDE WIRE: HCPCS

## 2022-08-30 PROCEDURE — 2580000003 HC RX 258: Performed by: INTERNAL MEDICINE

## 2022-08-30 PROCEDURE — C9600 PERC DRUG-EL COR STENT SING: HCPCS

## 2022-08-30 PROCEDURE — 027034Z DILATION OF CORONARY ARTERY, ONE ARTERY WITH DRUG-ELUTING INTRALUMINAL DEVICE, PERCUTANEOUS APPROACH: ICD-10-PCS | Performed by: INTERNAL MEDICINE

## 2022-08-30 PROCEDURE — 78452 HT MUSCLE IMAGE SPECT MULT: CPT

## 2022-08-30 PROCEDURE — 85347 COAGULATION TIME ACTIVATED: CPT

## 2022-08-30 PROCEDURE — B2151ZZ FLUOROSCOPY OF LEFT HEART USING LOW OSMOLAR CONTRAST: ICD-10-PCS | Performed by: INTERNAL MEDICINE

## 2022-08-30 PROCEDURE — 6360000002 HC RX W HCPCS: Performed by: INTERNAL MEDICINE

## 2022-08-30 PROCEDURE — 96374 THER/PROPH/DIAG INJ IV PUSH: CPT

## 2022-08-30 PROCEDURE — A9500 TC99M SESTAMIBI: HCPCS | Performed by: INTERNAL MEDICINE

## 2022-08-30 PROCEDURE — 96361 HYDRATE IV INFUSION ADD-ON: CPT

## 2022-08-30 RX ORDER — SODIUM CHLORIDE 9 MG/ML
INJECTION, SOLUTION INTRAVENOUS CONTINUOUS
Status: DISCONTINUED | OUTPATIENT
Start: 2022-08-30 | End: 2022-08-30

## 2022-08-30 RX ORDER — MORPHINE SULFATE 2 MG/ML
1 INJECTION, SOLUTION INTRAMUSCULAR; INTRAVENOUS
Status: ACTIVE | OUTPATIENT
Start: 2022-08-30 | End: 2022-08-30

## 2022-08-30 RX ORDER — LOSARTAN POTASSIUM 25 MG/1
25 TABLET ORAL DAILY
Status: DISCONTINUED | OUTPATIENT
Start: 2022-08-30 | End: 2022-08-31 | Stop reason: HOSPADM

## 2022-08-30 RX ORDER — CLOPIDOGREL BISULFATE 75 MG/1
75 TABLET ORAL DAILY
Status: DISCONTINUED | OUTPATIENT
Start: 2022-08-31 | End: 2022-08-31 | Stop reason: HOSPADM

## 2022-08-30 RX ORDER — SODIUM CHLORIDE 9 MG/ML
INJECTION, SOLUTION INTRAVENOUS PRN
Status: DISCONTINUED | OUTPATIENT
Start: 2022-08-30 | End: 2022-08-31 | Stop reason: HOSPADM

## 2022-08-30 RX ORDER — ENOXAPARIN SODIUM 100 MG/ML
30 INJECTION SUBCUTANEOUS DAILY
Status: DISCONTINUED | OUTPATIENT
Start: 2022-08-31 | End: 2022-08-31 | Stop reason: HOSPADM

## 2022-08-30 RX ORDER — SODIUM CHLORIDE 0.9 % (FLUSH) 0.9 %
5-40 SYRINGE (ML) INJECTION EVERY 12 HOURS SCHEDULED
Status: DISCONTINUED | OUTPATIENT
Start: 2022-08-30 | End: 2022-08-31 | Stop reason: HOSPADM

## 2022-08-30 RX ORDER — ACETAMINOPHEN 325 MG/1
650 TABLET ORAL EVERY 4 HOURS PRN
Status: DISCONTINUED | OUTPATIENT
Start: 2022-08-30 | End: 2022-08-31 | Stop reason: HOSPADM

## 2022-08-30 RX ORDER — ATROPINE SULFATE 0.4 MG/ML
0.5 AMPUL (ML) INJECTION
Status: ACTIVE | OUTPATIENT
Start: 2022-08-30 | End: 2022-08-30

## 2022-08-30 RX ORDER — SODIUM CHLORIDE 9 MG/ML
INJECTION, SOLUTION INTRAVENOUS CONTINUOUS
Status: DISCONTINUED | OUTPATIENT
Start: 2022-08-30 | End: 2022-08-31

## 2022-08-30 RX ORDER — ASPIRIN 81 MG/1
81 TABLET, CHEWABLE ORAL DAILY
Status: DISCONTINUED | OUTPATIENT
Start: 2022-08-31 | End: 2022-08-31 | Stop reason: HOSPADM

## 2022-08-30 RX ORDER — CARVEDILOL 6.25 MG/1
6.25 TABLET ORAL 2 TIMES DAILY WITH MEALS
Status: DISCONTINUED | OUTPATIENT
Start: 2022-08-30 | End: 2022-08-31 | Stop reason: HOSPADM

## 2022-08-30 RX ORDER — SODIUM CHLORIDE 0.9 % (FLUSH) 0.9 %
5-40 SYRINGE (ML) INJECTION PRN
Status: DISCONTINUED | OUTPATIENT
Start: 2022-08-30 | End: 2022-08-31 | Stop reason: HOSPADM

## 2022-08-30 RX ORDER — HYDROCODONE BITARTRATE AND ACETAMINOPHEN 5; 325 MG/1; MG/1
1 TABLET ORAL EVERY 6 HOURS PRN
Status: DISCONTINUED | OUTPATIENT
Start: 2022-08-30 | End: 2022-08-31 | Stop reason: HOSPADM

## 2022-08-30 RX ADMIN — SODIUM CHLORIDE: 9 INJECTION, SOLUTION INTRAVENOUS at 15:49

## 2022-08-30 RX ADMIN — ONDANSETRON 4 MG: 2 INJECTION INTRAMUSCULAR; INTRAVENOUS at 11:30

## 2022-08-30 RX ADMIN — ACETAMINOPHEN 650 MG: 325 TABLET ORAL at 21:19

## 2022-08-30 RX ADMIN — EZETIMIBE 10 MG: 10 TABLET ORAL at 21:19

## 2022-08-30 RX ADMIN — MICONAZOLE NITRATE: 2 POWDER TOPICAL at 22:17

## 2022-08-30 RX ADMIN — BUTALBITAL, ACETAMINOPHEN AND CAFFEINE 1 TABLET: 50; 325; 40 TABLET ORAL at 11:31

## 2022-08-30 RX ADMIN — ACETAMINOPHEN 650 MG: 325 TABLET ORAL at 05:55

## 2022-08-30 RX ADMIN — BUSPIRONE HYDROCHLORIDE 15 MG: 15 TABLET ORAL at 15:53

## 2022-08-30 RX ADMIN — ROSUVASTATIN CALCIUM 20 MG: 20 TABLET, FILM COATED ORAL at 21:19

## 2022-08-30 RX ADMIN — REGADENOSON 0.4 MG: 0.08 INJECTION, SOLUTION INTRAVENOUS at 10:12

## 2022-08-30 RX ADMIN — HYDROCODONE BITARTRATE AND ACETAMINOPHEN 1 TABLET: 5; 325 TABLET ORAL at 18:43

## 2022-08-30 RX ADMIN — ACETAMINOPHEN 650 MG: 325 TABLET ORAL at 15:51

## 2022-08-30 RX ADMIN — KIT FOR THE PREPARATION OF TECHNETIUM TC99M SESTAMIBI 30 MILLICURIE: 1 INJECTION, POWDER, LYOPHILIZED, FOR SOLUTION PARENTERAL at 10:15

## 2022-08-30 RX ADMIN — Medication 3 MG: at 21:19

## 2022-08-30 RX ADMIN — BUSPIRONE HYDROCHLORIDE 15 MG: 15 TABLET ORAL at 21:19

## 2022-08-30 RX ADMIN — INSULIN LISPRO 15 UNITS: 100 INJECTION, SOLUTION INTRAVENOUS; SUBCUTANEOUS at 16:07

## 2022-08-30 RX ADMIN — INSULIN GLARGINE 50 UNITS: 100 INJECTION, SOLUTION SUBCUTANEOUS at 21:20

## 2022-08-30 RX ADMIN — CARVEDILOL 6.25 MG: 6.25 TABLET, FILM COATED ORAL at 18:20

## 2022-08-30 RX ADMIN — KIT FOR THE PREPARATION OF TECHNETIUM TC99M SESTAMIBI 10 MILLICURIE: 1 INJECTION, POWDER, LYOPHILIZED, FOR SOLUTION PARENTERAL at 08:25

## 2022-08-30 RX ADMIN — SODIUM CHLORIDE: 9 INJECTION, SOLUTION INTRAVENOUS at 05:59

## 2022-08-30 RX ADMIN — METOPROLOL TARTRATE 25 MG: 25 TABLET, FILM COATED ORAL at 15:53

## 2022-08-30 RX ADMIN — SODIUM CHLORIDE: 9 INJECTION, SOLUTION INTRAVENOUS at 12:09

## 2022-08-30 RX ADMIN — ESCITALOPRAM OXALATE 20 MG: 10 TABLET ORAL at 15:53

## 2022-08-30 RX ADMIN — PANTOPRAZOLE SODIUM 40 MG: 40 TABLET, DELAYED RELEASE ORAL at 05:55

## 2022-08-30 RX ADMIN — LOSARTAN POTASSIUM 25 MG: 25 TABLET, FILM COATED ORAL at 18:20

## 2022-08-30 ASSESSMENT — PAIN SCALES - GENERAL
PAINLEVEL_OUTOF10: 6
PAINLEVEL_OUTOF10: 6
PAINLEVEL_OUTOF10: 5
PAINLEVEL_OUTOF10: 4
PAINLEVEL_OUTOF10: 8

## 2022-08-30 ASSESSMENT — PAIN DESCRIPTION - DESCRIPTORS
DESCRIPTORS: DISCOMFORT
DESCRIPTORS: ACHING
DESCRIPTORS: DISCOMFORT
DESCRIPTORS_2: ACHING
DESCRIPTORS: ACHING

## 2022-08-30 ASSESSMENT — PAIN DESCRIPTION - LOCATION
LOCATION: ARM
LOCATION: HEAD
LOCATION: CHEST
LOCATION: WRIST
LOCATION_2: CHEST
LOCATION: ARM

## 2022-08-30 ASSESSMENT — PAIN DESCRIPTION - ORIENTATION
ORIENTATION: MID
ORIENTATION: RIGHT
ORIENTATION_2: LEFT

## 2022-08-30 ASSESSMENT — PAIN SCALES - WONG BAKER
WONGBAKER_NUMERICALRESPONSE: 4

## 2022-08-30 ASSESSMENT — PAIN DESCRIPTION - INTENSITY: RATING_2: 1

## 2022-08-30 NOTE — PROGRESS NOTES
Post PCI doing well  Arm is stable  No chest pain -vitals are stable  Home tomorrow if stable  May need PCI of RCA for now suggest medical treatment --discussed with patient  If patient remains symptomatic suggest PCI of RCA as outpatient and refer to [facility]

## 2022-08-30 NOTE — PROGRESS NOTES
Daily Progress Note    Subjective:    Patient is awake alert--she had some chest pain this morning--troponin is coming down  We will get a stress test today  CTA chest was negative yesterday    CT chest-  Impression:        No evidence of pulmonary embolism or acute pulmonary abnormality. The patient had a heart catheterization done in 2016 in [facility],  mild CAD was noted. Cardiologist is Dr. June Hodges. PAST MEDICAL HISTORY:  History of having hypertension, hyperlipidemia,  history of breast cancer, had a lumpectomy done left-sided, had  radiation and chemotherapy also. She has diabetes present. She has a  history of loop recorder placement for neurocardiogenic syncope. PAST SURGICAL HISTORY:  Lumpectomy done, heart catheterization in 2016,  mild CAD noted. Most Recent Echo--Summary--8/22   Technically difficult study; definity used. Left ventricular systolic function is abnormal.   Ejection fraction is visually estimated at 40- 50%. Septal bounce noted. Mild left ventricular hypertrophy. No evidence of any pericardial effusion. Pericardial fat pad visualized.       Objective:   /60   Pulse 76   Temp 97.8 °F (36.6 °C) (Oral)   Resp 16   Ht 5' 10\" (1.778 m)   Wt 258 lb (117 kg)   SpO2 95%   BMI 37.02 kg/m²   No intake or output data in the 24 hours ending 08/30/22 1018    Medications:   Scheduled Meds:   lidocaine  1 patch TransDERmal Daily    aspirin  81 mg Oral Daily    busPIRone  15 mg Oral TID    escitalopram  20 mg Oral Daily    ezetimibe  10 mg Oral Nightly    melatonin  3 mg Oral Nightly    metoprolol tartrate  25 mg Oral BID    pantoprazole  40 mg Oral QAM AC    rosuvastatin  20 mg Oral Nightly    sodium chloride flush  5-40 mL IntraVENous 2 times per day    enoxaparin  30 mg SubCUTAneous BID    insulin glargine  50 Units SubCUTAneous QPM    insulin lispro  0-8 Units SubCUTAneous TID WC    insulin lispro  0-4 Units SubCUTAneous QPM    insulin lispro  15 Units SubCUTAneous TID WC    miconazole   Topical BID    aspirin  324 mg Oral Once      Infusions:   sodium chloride      dextrose      sodium chloride 100 mL/hr at 08/30/22 0559      PRN Meds:  technetium sestamibi, technetium sestamibi, butalbital-acetaminophen-caffeine, gabapentin, sodium chloride flush, sodium chloride, ondansetron **OR** ondansetron, polyethylene glycol, aluminum & magnesium hydroxide-simethicone, acetaminophen **OR** acetaminophen, glucose, dextrose bolus **OR** dextrose bolus, glucagon (rDNA), dextrose, ketorolac     Physical Exam:  Vitals:    08/30/22 0745   BP: 122/60   Pulse: 76   Resp: 16   Temp: 97.8 °F (36.6 °C)   SpO2: 95%        General: AAO, NAD  Chest: Nontender  Cardiac: First and Second Heart Sounds are Normal, No Murmurs or Gallops noted  Lungs:Clear to auscultation and percussion. Abdomen: Soft, NT, ND, +BS  Extremities: No clubbing, no edema  Vascular:  Equal 2+ peripheral pulses. Lab Data:  CBC:   Recent Labs     08/28/22 2023   WBC 11.2*   HGB 13.8   HCT 42.3   MCV 87.2        BMP:   Recent Labs     08/28/22 2023      K 4.3   CL 99   CO2 24   BUN 14   CREATININE 0.6     LIVER PROFILE:   Recent Labs     08/28/22 2023   AST 17   ALT 17   LIPASE 69*   BILITOT 0.2   ALKPHOS 124     PT/INR:   Recent Labs     08/29/22  0907   PROTIME 11.7   INR 0.91     APTT:   Recent Labs     08/29/22  0907   APTT 23.2*     BNP:  No results for input(s): BNP in the last 72 hours.       Assessment:  Patient Active Problem List    Diagnosis Date Noted    Chest pain 08/29/2022    Nausea     History of colon polyps     Dyspepsia     Malignant neoplasm of upper-inner quadrant of left female breast ([facility] [address] 08/03/2020       Electronically signed by Delia Hall MD on 8/30/2022 at 10:18 AM

## 2022-08-30 NOTE — BRIEF OP NOTE
Brief Postoperative Note      Patient: Sameera Austin  YOB: 1960  MRN: [id number]    Date of Procedure: 8/30/22    Pre-Op Diagnosis:  chest pain and CAD    Post-Op Diagnosis: Same       Estimated Blood Loss (mL): Minimal    Complications: None    Findings: [id number]  LEFT MAIN PATENT  LAD PROXIMAL 90% TO 0% KWASI RESOLUTE 2.5X15 MM STENT, AND MID 80% TO 0% KWASI RESOLUTE 2.5X15MM STENT  LCX/OM MILD DX  RCA PROXIMAL 99% -WITH RIGHT TO RIGHT COLLATERAL-UNABLE TO CROSS -ATTEMPTED  LVEDP 15  ASA/PLAVIX AND HEPARIN  RIGHT RADIAL   NO COMPLICATIONS  MAX MEDICAL TREATMENT FOR NOW  IF SHE CONTINUE TO HAVE SYMPTOMS PCI OF RCA -      Electronically signed by Neelam Mendenhall MD on 8/30/2022 at 2:19 PM

## 2022-08-30 NOTE — PROGRESS NOTES
V2.0  [facility] Hospitalist Progress Note      Name:  Ana Tapia /Age/Sex: 1960  (63 y.o. female)   MRN & CSN:  [id number] & [id number] Encounter Date/Time: 2022 2:07 PM EDT    Location:  Cath Lab/NONE PCP: Delta Hooker MD       Hospital Day: 3    Assessment and Plan:   Ana Tapia is a 63 y.o. female with CAD      Plan:    NSTEMI  - positive stress test  - cath lab     Cutaneous candidiasis - miconazole powder    DM2  HTN  Obesity    Ppx:  Dispo:     Subjective:     Chief Complaint: Chest Pain (Took norco and asa around 194) and Nausea       Patient not physically seen today as she was absent from her room all day due to cardiac work up and intervention. Labs and imaging reviewed. Review of Systems:    Review of Systems    10 point ROS unable to be done today    Objective:      Intake/Output Summary (Last 24 hours) at 2022 1407  Last data filed at 2022 1131  Gross per 24 hour   Intake 600 ml   Output --   Net 600 ml        Vitals:   Vitals:    22 1245   BP:    Pulse:    Resp:    Temp:    SpO2: 95%       Physical Exam:    Unable to be done today    Medications:   Medications:    lidocaine  1 patch TransDERmal Daily    aspirin  81 mg Oral Daily    busPIRone  15 mg Oral TID    escitalopram  20 mg Oral Daily    ezetimibe  10 mg Oral Nightly    melatonin  3 mg Oral Nightly    metoprolol tartrate  25 mg Oral BID    pantoprazole  40 mg Oral QAM AC    rosuvastatin  20 mg Oral Nightly    sodium chloride flush  5-40 mL IntraVENous 2 times per day    enoxaparin  30 mg SubCUTAneous BID    insulin glargine  50 Units SubCUTAneous QPM    insulin lispro  0-8 Units SubCUTAneous TID WC    insulin lispro  0-4 Units SubCUTAneous QPM    insulin lispro  15 Units SubCUTAneous TID WC    miconazole   Topical BID    aspirin  324 mg Oral Once      Infusions:    sodium chloride 50 mL/hr at 22 1209    sodium chloride      dextrose       PRN Meds: butalbital-acetaminophen-caffeine, 1 tablet, Q6H PRN  gabapentin, 600 mg, Daily PRN  sodium chloride flush, 5-40 mL, PRN  sodium chloride, , PRN  ondansetron, 4 mg, Q8H PRN   Or  ondansetron, 4 mg, Q6H PRN  polyethylene glycol, 17 g, Daily PRN  aluminum & magnesium hydroxide-simethicone, 30 mL, Q6H PRN  acetaminophen, 650 mg, Q6H PRN   Or  acetaminophen, 650 mg, Q6H PRN  glucose, 4 tablet, PRN  dextrose bolus, 125 mL, PRN   Or  dextrose bolus, 250 mL, PRN  glucagon (rDNA), 1 mg, PRN  dextrose, , Continuous PRN  ketorolac, 30 mg, Q6H PRN        Labs      Recent Results (from the past 24 hour(s))   POCT Glucose    Collection Time: 08/29/22  4:19 PM   Result Value Ref Range    POC Glucose 148 (H) 70 - 99 MG/DL   POCT Glucose    Collection Time: 08/29/22  8:54 PM   Result Value Ref Range    POC Glucose 148 (H) 70 - 99 MG/DL   EKG 12 Lead    Collection Time: 08/30/22  7:19 AM   Result Value Ref Range    Ventricular Rate 65 BPM    Atrial Rate 65 BPM    P-R Interval 164 ms    QRS Duration 88 ms    Q-T Interval 448 ms    QTc Calculation (Bazett) 465 ms    P Axis 62 degrees    R Axis 15 degrees    T Axis 6 degrees    Diagnosis       Normal sinus rhythm  Nonspecific ST abnormality  Abnormal ECG  When compared with ECG of 28-AUG-2022 20:36,  No significant change was found  Confirmed by [facility] AURORA RAMOS ([id number]) on 8/30/2022 1:02:55 PM     POCT Glucose    Collection Time: 08/30/22 11:21 AM   Result Value Ref Range    POC Glucose 147 (H) 70 - 99 MG/DL        Imaging/Diagnostics Last 24 Hours   Echocardiogram complete 2D with doppler with color    Result Date: 8/29/2022  Transthoracic Echocardiography Report (TTE)  Demographics   Patient Name       Arpita Clarke  Date of Study       08/29/2022   Date of Birth      1960         Gender              Female   Age                63 year(s)         Race                   Patient Number     [id number]         Room Number         5450   Visit Number       [id number] Corporate ID       [id number]   Accession Number   [id number]         [address],                                                            [facility]   Ordering Physician Margo Friend MD                     CNP                Physician  Procedure Type of Study   TTE procedure:ECHOCARDIOGRAM COMPLETE 2D W DOPPLER W COLOR. Procedure Date Date: 08/29/2022 Start: 11:43 AM Study Location: Portable Technical Quality: Fair visualization Indications:Chest pain. Patient Status: Routine Height: 70 inches Weight: 258 pounds BSA: 2.33 m2 BMI: 37.02 kg/m2 HR: 66 bpm BP: 121/59 mmHg  Conclusions   Summary  Technically difficult study; definity used. Left ventricular systolic function is abnormal.  Ejection fraction is visually estimated at 40- 50%. Septal bounce noted. Mild left ventricular hypertrophy. No evidence of any pericardial effusion. Pericardial fat pad visualized. Signature   ------------------------------------------------------------------  Electronically signed by Indra Juan MD (Interpreting  physician) on 08/29/2022 at 02:57 PM  ------------------------------------------------------------------   Findings   Left Ventricle  Left ventricular systolic function is abnormal.  Ejection fraction is visually estimated at 40-50%. Septal bounce noted. Mild left ventricular hypertrophy. Diastolic function unable to be determined due to technically difficult  study. Left Atrium  Essentially normal left atrium. Right Atrium  Right atrium is not clearly visualized. Right Ventricle  The right ventricle was not clearly visualized. Aortic Valve  Individual aortic valve leaflets are not clearly visualized; however,  doppler evaluation does not suggest significant regurgitation or stenosis. Mitral Valve  Structurally normal mitral valve. Tricuspid Valve  Tricuspid valve was not well visualized. Trace tricuspid regurgitation; normal RVSP.    Pulmonic Valve  The pulmonic valve was not well visualized. Pericardial Effusion  No evidence of any pericardial effusion. Pleural Effusion  No evidence of pleural effusion. Miscellaneous  Pericardial fat pad visualized.   M-Mode/2D Measurements & Calculations   LV Diastolic Dimension:   LV Systolic Dimension:   LA Dimension: 4.1 cmAO  4.55 cm                   3.55 cm                  Root Dimension: 2.9 cm  LV FS:22 %                LV Volume Diastolic: 170  LV PW Diastolic: 8.80 cm  ml  Septum Diastolic: 2.69 cm LV Volume Systolic: 79  CO: 5.81 l/min            ml  CI: 1.82 l/m*m2           LV EDV/LV EDV Index: 129 LA/Aorta: 1.41                            ml/55 m2LV ESV/LV ESV  LV Area Diastolic: 26.8   Index: 79 ml/34 m2  cm2                       EF Calculated (A4C):  LV Area Systolic: 29 cm2  79.9 %                            EF Calculated (2D): 44.6                            %                             LV Length: 9.77 cm                             LVOT: 2.2 cm  Doppler Measurements & Calculations   MV Peak E-Wave: 76.5    AV Peak Velocity: 94 cm/s    LVOT Peak Velocity:  cm/s                    AV Peak Gradient: 3.53 mmHg  77.5 cm/s  MV Peak A-Wave: 82.9    AV Mean Velocity: 65.9 cm/s  LVOT Mean Velocity:  cm/s                    AV Mean Gradient: 2 mmHg     49.8 cm/s  MV E/A Ratio: 0.92      AV VTI: 17.7 cm              LVOT Peak Gradient: 3  MV Peak Gradient: 2.34  AV Area (Continuity):3.63    mmHgLVOT Mean Gradient:  mmHg                    cm2                          1 mmHg                           LVOT VTI: 16.9 cm                                                        PV Peak Velocity: 85.5                                                       cm/s                                                       PV Peak Gradient: 2.92                                                       mmHg      XR CHEST PORTABLE    Result Date: 8/28/2022  EXAMINATION: ONE XRAY VIEW OF THE CHEST 8/28/2022 9:16 pm COMPARISON: Chest x-ray November 16, 2016; CT chest November 16, 2016 HISTORY: ORDERING SYSTEM PROVIDED HISTORY: chest pain TECHNOLOGIST PROVIDED HISTORY: Reason for exam:->chest pain Reason for Exam: chest pain Additional signs and symptoms: SOB Relevant Medical/Surgical History: asthma FINDINGS: Cardiac silhouette is stable. No focal consolidation, pleural effusion, or pneumothorax. Cardiac loop recorder in place. Osseous structures are intact. 1. No acute cardiopulmonary process identified. CTA PULMONARY W CONTRAST    Result Date: 8/29/2022  EXAMINATION: CTA OF THE CHEST 8/29/2022 3:48 pm TECHNIQUE: CTA of the chest was performed after the administration of intravenous contrast.  Multiplanar reformatted images are provided for review. MIP images are provided for review. Automated exposure control, iterative reconstruction, and/or weight based adjustment of the mA/kV was utilized to reduce the radiation dose to as low as reasonably achievable. COMPARISON: November 16, 2016 HISTORY: ORDERING SYSTEM PROVIDED HISTORY: chest pain TECHNOLOGIST PROVIDED HISTORY: Reason for exam:->chest pain Reason for Exam: chest pain FINDINGS: Pulmonary Arteries: Pulmonary arteries are adequately opacified for evaluation. No evidence of intraluminal filling defect to suggest pulmonary embolism. Main pulmonary artery is normal in caliber. Mediastinum: No evidence of mediastinal lymphadenopathy. The heart and pericardium demonstrate no acute abnormality. There is no acute abnormality of the thoracic aorta. Lungs/pleura: The lungs are without acute process. No focal consolidation or pulmonary edema. No evidence of pleural effusion or pneumothorax. Upper Abdomen: Limited images of the upper abdomen are unremarkable. Soft Tissues/Bones: No acute bone or soft tissue abnormality. No evidence of pulmonary embolism or acute pulmonary abnormality.      NM MYOCARDIAL SPECT REST EXERCISE OR RX    Result Date: 8/30/2022  Cardiac Perfusion Imaging   Demographics   Patient Name      Naheed Sarkar  Date of study        08/30/2022   Date of Birth     1960         Gender               Female   Age               63 year(s)         Race                    Patient Number    [id number]         Room Number          9600   Visit Number      [id number]          Height               70 inches   Corporate ID      [id number]           Weight               258 pounds   Accession Number  [id number]                                        NM Technologist      Lew Cadena MD   Interpreting         Shayla Lizarraga MD  Physician                            Cardiologist   Conclusions   Summary  No EKG changes suggestive of ischemia with Lexiscan infusion. Severe adam-apical ischemia noted  normal perfusion in the septal/lateral and inferior wall--noted  clinical correlation needed  gating shows EF 41%   Signatures   ------------------------------------------------------------------  Electronically signed by Shayla Lizarraga MD (Interpreting  cardiologist) on 08/30/2022 at 12:26  ------------------------------------------------------------------  Procedure Procedure Type:   Nuclear Stress Test:Pharmacological, Myocardial Perfusion Imaging with  Pharm, NM MYOCARDIAL SPECT REST EXERCISE OR RX  Indications: Chest pain. Risk Factors   The patient risk factors include:hypercholesterolemia, hypertension and  diabetes mellitus. Stress Protocols   Resting ECG  Normal sinus rhythm. Resting HR:67 bpm  Resting BP:141/66 mmHg  Stress Protocol:Pharmacologic - Lexiscan  Peak HR:86 bpm                      HR/BP product:61664  Peak BP:141/66 mmHg  Predicted HR: 160 bpm  % of predicted HR: 54   Exercise duration: 01:02 min  Reason for termination:Completed   Symptoms  No symptoms with Lexiscan infusion. Stress Interpretation  No EKG changes suggestive of ischemia with Lexiscan infusion.   Procedure Medications   - Lexiscan I.V. bolus (over 15sec.) 0.4 mg admininstered @ 08/30/2022 10:15. Imaging Protocols   Rest                             Stress   Isotope:Sestamibi 99mTc          Isotope: Sestamibi 99mTc  Isotope dose:10.5 mCi            Isotope dose:30.9 mCi  Administration route: I.V. Administration route: I.V. Injection Date:08/30/2022 08:25  Injection Date:08/30/2022 10:15  Scan Date:08/30/2022 09:25       Scan Date:08/30/2022 11:15   Technique:        SPECT          Technique:        Gated                                                     SPECT   Procedure Description   Upon patient arrival, the patient is identified using two identifiers and  the physician order is verified. An IV is established and 8-11mCi of 99mTc  Sestamibi is intravenously injected and followed with 10mL 0.9% Normal  Saline flush. A circulation period of 45 minutes occurs prior to resting  SPECT imaging. After imaging is complete the patient is escorted to the  stress lab. The patient is connected to the ECG and blood pressure is  measured. The RN starts the stress portion of the exam and rapidly  intravenously injects Lexiscan (regadenosine) 0.4mg over a period of 10 to15  seconds and follows with 5mL 0.9% Normal Saline flush. Immediately following  the Nuclear Technologist intravenously injects 22-33mCi of 99mTc Sestamibi  and 5mL 0.9% Normal Saline flush. After completion, recovery, and removal of  the IV, the patient rests during the second circulation period of 45  minutes. Final stress SPECT gated imaging is performed. The patient may  return home or to their room after stress imaging. The images are processed  and final charting is completed and sent to the appropriate cardiologist for  interpretation and reporting.    Perfusion Interpretation   Severe adam-apical ischemia noted  normal perfusion in the septal/lateral and inferior  wall--noted  clinical correlation needed  gating shows EF 41%  Imaging Results    Summed scores     - Summed stress score: 20     - Summed rest score: 2     - Summed difference score:    18   Rest ejection  Ejection fraction:41 %  EDV :122 ml  ESV :72 ml  Stroke volume :50 ml  Medical History   Accession#:  [id number]  Admission Data Admission date: 08/28/2022 Admission Time: [facility] Status: Inpatient.       Electronically signed by Weston Mata MD on 8/30/2022 at 2:07 PM

## 2022-08-30 NOTE — PROGRESS NOTES
Plan for cath  Devante/apical ischemia EF 41% range   Patient agreed to proceed with cath  Start IVF  Cath 2016-negative

## 2022-08-31 VITALS
HEIGHT: 70 IN | DIASTOLIC BLOOD PRESSURE: 51 MMHG | RESPIRATION RATE: 16 BRPM | HEART RATE: 80 BPM | WEIGHT: 258 LBS | SYSTOLIC BLOOD PRESSURE: 121 MMHG | BODY MASS INDEX: 36.94 KG/M2 | OXYGEN SATURATION: 96 % | TEMPERATURE: 97.7 F

## 2022-08-31 LAB
ANION GAP SERPL CALCULATED.3IONS-SCNC: 8 MMOL/L (ref 4–16)
BUN BLDV-MCNC: 13 MG/DL (ref 6–23)
CALCIUM SERPL-MCNC: 9.1 MG/DL (ref 8.3–10.6)
CHLORIDE BLD-SCNC: 105 MMOL/L (ref 99–110)
CO2: 27 MMOL/L (ref 21–32)
CREAT SERPL-MCNC: 0.5 MG/DL (ref 0.6–1.1)
EKG ATRIAL RATE: 70 BPM
EKG DIAGNOSIS: NORMAL
EKG P AXIS: 62 DEGREES
EKG P-R INTERVAL: 154 MS
EKG Q-T INTERVAL: 418 MS
EKG QRS DURATION: 82 MS
EKG QTC CALCULATION (BAZETT): 451 MS
EKG R AXIS: 28 DEGREES
EKG T AXIS: -14 DEGREES
EKG VENTRICULAR RATE: 70 BPM
GFR AFRICAN AMERICAN: >60 ML/MIN/1.73M2
GFR NON-AFRICAN AMERICAN: >60 ML/MIN/1.73M2
GLUCOSE BLD-MCNC: 111 MG/DL (ref 70–99)
GLUCOSE BLD-MCNC: 227 MG/DL (ref 70–99)
GLUCOSE BLD-MCNC: 97 MG/DL (ref 70–99)
HCT VFR BLD CALC: 36.6 % (ref 37–47)
HEMOGLOBIN: 11.9 GM/DL (ref 12.5–16)
MCH RBC QN AUTO: 29 PG (ref 27–31)
MCHC RBC AUTO-ENTMCNC: 32.5 % (ref 32–36)
MCV RBC AUTO: 89.1 FL (ref 78–100)
PDW BLD-RTO: 13.4 % (ref 11.7–14.9)
PLATELET # BLD: 256 K/CU MM (ref 140–440)
PMV BLD AUTO: 10 FL (ref 7.5–11.1)
POTASSIUM SERPL-SCNC: 4.1 MMOL/L (ref 3.5–5.1)
RBC # BLD: 4.11 M/CU MM (ref 4.2–5.4)
SODIUM BLD-SCNC: 140 MMOL/L (ref 135–145)
WBC # BLD: 8.3 K/CU MM (ref 4–10.5)

## 2022-08-31 PROCEDURE — 2580000003 HC RX 258: Performed by: INTERNAL MEDICINE

## 2022-08-31 PROCEDURE — 82962 GLUCOSE BLOOD TEST: CPT

## 2022-08-31 PROCEDURE — 36415 COLL VENOUS BLD VENIPUNCTURE: CPT

## 2022-08-31 PROCEDURE — 94761 N-INVAS EAR/PLS OXIMETRY MLT: CPT

## 2022-08-31 PROCEDURE — 2500000003 HC RX 250 WO HCPCS: Performed by: INTERNAL MEDICINE

## 2022-08-31 PROCEDURE — 6360000002 HC RX W HCPCS: Performed by: INTERNAL MEDICINE

## 2022-08-31 PROCEDURE — 6370000000 HC RX 637 (ALT 250 FOR IP): Performed by: SURGERY

## 2022-08-31 PROCEDURE — 93010 ELECTROCARDIOGRAM REPORT: CPT | Performed by: INTERNAL MEDICINE

## 2022-08-31 PROCEDURE — 6370000000 HC RX 637 (ALT 250 FOR IP): Performed by: INTERNAL MEDICINE

## 2022-08-31 PROCEDURE — 80048 BASIC METABOLIC PNL TOTAL CA: CPT

## 2022-08-31 PROCEDURE — 85027 COMPLETE CBC AUTOMATED: CPT

## 2022-08-31 RX ORDER — LOSARTAN POTASSIUM 25 MG/1
25 TABLET ORAL DAILY
Qty: 30 TABLET | Refills: 3 | Status: SHIPPED | OUTPATIENT
Start: 2022-09-01

## 2022-08-31 RX ORDER — HYDROCODONE BITARTRATE AND ACETAMINOPHEN 5; 325 MG/1; MG/1
1 TABLET ORAL EVERY 6 HOURS PRN
Qty: 12 TABLET | Refills: 0 | Status: SHIPPED | OUTPATIENT
Start: 2022-08-31 | End: 2022-08-31 | Stop reason: SDUPTHER

## 2022-08-31 RX ORDER — PANTOPRAZOLE SODIUM 40 MG/1
40 TABLET, DELAYED RELEASE ORAL
Qty: 30 TABLET | Refills: 3 | Status: SHIPPED | OUTPATIENT
Start: 2022-09-01

## 2022-08-31 RX ORDER — CARVEDILOL 6.25 MG/1
6.25 TABLET ORAL 2 TIMES DAILY WITH MEALS
Qty: 60 TABLET | Refills: 3 | Status: SHIPPED | OUTPATIENT
Start: 2022-08-31

## 2022-08-31 RX ORDER — EZETIMIBE 10 MG/1
10 TABLET ORAL NIGHTLY
Qty: 30 TABLET | Refills: 3 | Status: SHIPPED | OUTPATIENT
Start: 2022-08-31

## 2022-08-31 RX ORDER — CLOPIDOGREL BISULFATE 75 MG/1
75 TABLET ORAL DAILY
Qty: 30 TABLET | Refills: 3 | Status: SHIPPED | OUTPATIENT
Start: 2022-09-01

## 2022-08-31 RX ORDER — HYDROCODONE BITARTRATE AND ACETAMINOPHEN 5; 325 MG/1; MG/1
1 TABLET ORAL EVERY 6 HOURS PRN
Qty: 12 TABLET | Refills: 0 | Status: SHIPPED | OUTPATIENT
Start: 2022-08-31 | End: 2022-09-03

## 2022-08-31 RX ADMIN — MICONAZOLE NITRATE: 2 POWDER TOPICAL at 11:00

## 2022-08-31 RX ADMIN — HYDROCODONE BITARTRATE AND ACETAMINOPHEN 1 TABLET: 5; 325 TABLET ORAL at 09:42

## 2022-08-31 RX ADMIN — ENOXAPARIN SODIUM 30 MG: 100 INJECTION SUBCUTANEOUS at 07:47

## 2022-08-31 RX ADMIN — CLOPIDOGREL BISULFATE 75 MG: 75 TABLET ORAL at 07:46

## 2022-08-31 RX ADMIN — ASPIRIN 81 MG CHEWABLE TABLET 81 MG: 81 TABLET CHEWABLE at 07:46

## 2022-08-31 RX ADMIN — BUSPIRONE HYDROCHLORIDE 15 MG: 15 TABLET ORAL at 07:47

## 2022-08-31 RX ADMIN — CARVEDILOL 6.25 MG: 6.25 TABLET, FILM COATED ORAL at 07:47

## 2022-08-31 RX ADMIN — SODIUM CHLORIDE, PRESERVATIVE FREE 10 ML: 5 INJECTION INTRAVENOUS at 08:33

## 2022-08-31 RX ADMIN — PANTOPRAZOLE SODIUM 40 MG: 40 TABLET, DELAYED RELEASE ORAL at 06:03

## 2022-08-31 RX ADMIN — HYDROCODONE BITARTRATE AND ACETAMINOPHEN 1 TABLET: 5; 325 TABLET ORAL at 00:21

## 2022-08-31 RX ADMIN — LOSARTAN POTASSIUM 25 MG: 25 TABLET, FILM COATED ORAL at 07:46

## 2022-08-31 RX ADMIN — INSULIN LISPRO 15 UNITS: 100 INJECTION, SOLUTION INTRAVENOUS; SUBCUTANEOUS at 11:19

## 2022-08-31 RX ADMIN — SODIUM CHLORIDE, PRESERVATIVE FREE 10 ML: 5 INJECTION INTRAVENOUS at 08:34

## 2022-08-31 RX ADMIN — INSULIN LISPRO 2 UNITS: 100 INJECTION, SOLUTION INTRAVENOUS; SUBCUTANEOUS at 11:19

## 2022-08-31 RX ADMIN — ACETAMINOPHEN 650 MG: 325 TABLET ORAL at 07:46

## 2022-08-31 RX ADMIN — ESCITALOPRAM OXALATE 20 MG: 10 TABLET ORAL at 07:47

## 2022-08-31 ASSESSMENT — PAIN SCALES - GENERAL
PAINLEVEL_OUTOF10: 2
PAINLEVEL_OUTOF10: 5
PAINLEVEL_OUTOF10: 4

## 2022-08-31 ASSESSMENT — PAIN DESCRIPTION - LOCATION
LOCATION: ARM
LOCATION: ARM

## 2022-08-31 ASSESSMENT — PAIN DESCRIPTION - DESCRIPTORS
DESCRIPTORS: ACHING
DESCRIPTORS: ACHING

## 2022-08-31 ASSESSMENT — PAIN SCALES - WONG BAKER: WONGBAKER_NUMERICALRESPONSE: 4

## 2022-08-31 ASSESSMENT — PAIN DESCRIPTION - ORIENTATION: ORIENTATION: RIGHT

## 2022-08-31 NOTE — FLOWSHEET NOTE
Discharge instructions provided to patient, prescriptions provided for patient, patient educated on signs and symptoms of heart attack, patient instructed to return if chest pain worsens, patient also advised to follow up with PCP and cardiologist, patient also informed of medications at pharmacy that need to be picked up, patient acknowledge an understanding, IV removed,   Cath site assessed, no signs or symptoms of infection, patient educated on proper care of site and monitoring, Tech is willing patient to lobby in wheelchair.

## 2022-08-31 NOTE — PROGRESS NOTES
Daily Progress Note  Subjective:    Pt. Awake, alert and feeling ok  HR stable, NSR, BP stable  No CP, SOB today, She is having some Rt. Arm pain just proximal to radial site    Attending Note:    Feeling better  No chest pain  S/p PCI of LAD   Ok for home  F/u with DR. Marquez Client  Max medical treatment for now    Impression and Plan:     Chest pain    LHC done yesterday d/t concerning symptoms    S/p PCI to proximal and mid LAD    Also has a 99% RCA stenosis but collaterals noted so no stent placed    Plan is to monitor and if she has symptoms going forward would rec atherectomy for RCA stenosis    Rt. Arm pain noted proximal to site- vascular status appears intact- radial pulse noted and good capillary refill- would monitor for now    Labs stable    Keep on current cardiac meds on D/C    Increase activity  Stable for D/C today when ok with primary team  F/u with Dr. Crocker [facility] cardiologist- at his office in one week    Most Recent Echo  Summary--8/22   Technically difficult study; definity used. Left ventricular systolic function is abnormal.   Ejection fraction is visually estimated at 40- 50%. Septal bounce noted. Mild left ventricular hypertrophy. No evidence of any pericardial effusion. Pericardial fat pad visualized. Most Recent Heart Cath  [facility]-8/30/22  LEFT MAIN PATENT  LAD PROXIMAL 90% TO 0% KWASI RESOLUTE 2.5X15 MM STENT, AND MID 80% TO 0% KWASI RESOLUTE 2.5X15MM STENT  LCX/OM MILD DX  RCA PROXIMAL 99% -WITH RIGHT TO RIGHT COLLATERAL-UNABLE TO CROSS -ATTEMPTED  LVEDP 15  ASA/PLAVIX AND HEPARIN  RIGHT RADIAL   NO COMPLICATIONS  MAX MEDICAL TREATMENT FOR NOW  IF SHE CONTINUE TO HAVE SYMPTOMS PCI OF RCA -    PAST MEDICAL HISTORY:  History of having hypertension, hyperlipidemia,  history of breast cancer, had a lumpectomy done left-sided, had  radiation and chemotherapy also. She has diabetes present. She has a  history of loop recorder placement for neurocardiogenic syncope.      PAST SURGICAL HISTORY:  Lumpectomy done, heart catheterization in 2016,  mild CAD noted. SOCIAL HISTORY:  Does not smoke. Does not drink. ALLERGIES:  TAPE.      HOME MEDICATIONS:  She is on Lopressor 25 b.i.d., Lamisil, BuSpar,  tetracycline, Prilosec, Jardiance, Zetia, Crestor, Vascepa, Actos, see  the rest of the list.    Objective:   BP (!) 148/63   Pulse 74   Temp 97.8 °F (36.6 °C) (Oral)   Resp 18   Ht 5' 10\" (1.778 m)   Wt 258 lb (117 kg)   SpO2 96%   BMI 37.02 kg/m²     Intake/Output Summary (Last 24 hours) at 8/31/2022 0837  Last data filed at 8/30/2022 1131  Gross per 24 hour   Intake 360 ml   Output --   Net 360 ml       Medications:   Scheduled Meds:   sodium chloride flush  5-40 mL IntraVENous 2 times per day    aspirin  81 mg Oral Daily    clopidogrel  75 mg Oral Daily    enoxaparin  30 mg SubCUTAneous Daily    carvedilol  6.25 mg Oral BID WC    losartan  25 mg Oral Daily    lidocaine  1 patch TransDERmal Daily    busPIRone  15 mg Oral TID    escitalopram  20 mg Oral Daily    ezetimibe  10 mg Oral Nightly    melatonin  3 mg Oral Nightly    pantoprazole  40 mg Oral QAM AC    rosuvastatin  20 mg Oral Nightly    sodium chloride flush  5-40 mL IntraVENous 2 times per day    insulin glargine  50 Units SubCUTAneous QPM    insulin lispro  0-8 Units SubCUTAneous TID WC    insulin lispro  0-4 Units SubCUTAneous QPM    insulin lispro  15 Units SubCUTAneous TID WC    miconazole   Topical BID      Infusions:   sodium chloride 50 mL/hr at 08/30/22 1550    sodium chloride      sodium chloride      dextrose        PRN Meds:  sodium chloride flush, sodium chloride, acetaminophen, HYDROcodone-acetaminophen, butalbital-acetaminophen-caffeine, gabapentin, sodium chloride flush, sodium chloride, ondansetron **OR** ondansetron, polyethylene glycol, aluminum & magnesium hydroxide-simethicone, acetaminophen **OR** acetaminophen, glucose, dextrose bolus **OR** dextrose bolus, glucagon (rDNA), dextrose     Physical Exam:  Vitals:    08/31/22 0744   BP: (!) 148/63   Pulse: 74   Resp: 18   Temp: 97.8 °F (36.6 °C)   SpO2: 96%        General: AAO, NAD  Chest: Nontender  Cardiac: First and Second Heart Sounds are Normal, No Murmurs or Gallops noted  Lungs:Clear to auscultation and percussion. Abdomen: Soft, NT, ND, +BS  Extremities: No clubbing, no edema  Vascular:  Equal 2+ peripheral pulses. Lab Data:  CBC:   Recent Labs     08/28/22 2023 08/31/22  0039   WBC 11.2* 8.3   HGB 13.8 11.9*   HCT 42.3 36.6*   MCV 87.2 89.1    256     BMP:   Recent Labs     08/28/22 2023 08/31/22  0039    140   K 4.3 4.1   CL 99 105   CO2 24 27   BUN 14 13   CREATININE 0.6 0.5*     LIVER PROFILE:   Recent Labs     08/28/22 2023   AST 17   ALT 17   LIPASE 69*   BILITOT 0.2   ALKPHOS 124     PT/INR:   Recent Labs     08/29/22  0907   PROTIME 11.7   INR 0.91     APTT:   Recent Labs     08/29/22  0907   APTT 23.2*     BNP:  No results for input(s): BNP in the last 72 hours.       Assessment:  Patient Active Problem List    Diagnosis Date Noted    NSTEMI (non-ST elevated myocardial infarction) ([facility] 08/30/2022    Chest pain 08/29/2022    Nausea     History of colon polyps     Dyspepsia     Malignant neoplasm of upper-inner quadrant of left female breast ([facility] 08/03/2020       Electronically signed by Victor Hugo Rudd PA-C on 8/31/2022 at 8:37 AM      Electronically signed by Leo Kitchen MD on 8/31/22 at 1:08 PM EDT

## 2022-08-31 NOTE — PROGRESS NOTES
Seen by cardiac rehab status post PTCA. Patient  awake, alert and sitting up in bed. I introduced myself as the cardiac rehab nurse as well as introducing him to the [address].   I explained to her that this program is a customized out patient program of exercise and education. I explained to the patient that Cardiac Rehab is designed to help improve your hearts future. Stressed to her the importance of compliance with antiplatelet therapy. Discussed risk factor identification and modification. Teaching done on cardiac diet. Explained the benefits of regular exercise program and stressed the need for a long term commitment to heart healthy practices in controlling this chronic disease. Patient viewing video of the [facility] program overview at this time.

## 2022-08-31 NOTE — PROGRESS NOTES
Outpatient Pharmacy Progress Note for Meds-to-Beds    Total number of Prescriptions Filled: 6  The following medications were dispensed to the patient during the discharge process:  Antifungal powder  Pantoprazole  Losartan  Clopidogrel  Carvedilol  Ezetimibe     Additional Documentation:  Patient picked-up the medication(s) in the OP Pharmacy      Thank you for letting us serve your patients.   [address] [place]    [address], [address] [facility]    Phone: [phone]    Fax: [phone]

## 2022-08-31 NOTE — PLAN OF CARE
Problem: Discharge Planning  Goal: Discharge to home or other facility with appropriate resources  8/31/2022 1205 by Alessandro Hernández RN  Outcome: Adequate for Discharge  8/31/2022 0959 by Alessandro Hernández RN  Outcome: Progressing  Flowsheets (Taken 8/31/2022 0439 by Deirdre Vieira RN)  Discharge to home or other facility with appropriate resources:   Identify barriers to discharge with patient and caregiver   Arrange for interpreters to assist at discharge as needed   Arrange for needed discharge resources and transportation as appropriate   Refer to discharge planning if patient needs post-hospital services based on physician order or complex needs related to functional status, cognitive ability or social support system   Identify discharge learning needs (meds, wound care, etc)     Problem: Safety - Adult  Goal: Free from fall injury  8/31/2022 1205 by Alessandro Hernández RN  Outcome: Adequate for Discharge  8/31/2022 0959 by Alessandro Hernández RN  Outcome: Progressing     Problem: Pain  Goal: Verbalizes/displays adequate comfort level or baseline comfort level  8/31/2022 1205 by Alessandro Hernández RN  Outcome: Adequate for Discharge  8/31/2022 0959 by Alessandro Hernández RN  Outcome: Progressing     Problem: Chronic Conditions and Co-morbidities  Goal: Patient's chronic conditions and co-morbidity symptoms are monitored and maintained or improved  8/31/2022 1205 by Alessandro Hernández RN  Outcome: Adequate for Discharge  8/31/2022 0959 by Alessandro Hernández RN  Outcome: Progressing

## 2022-08-31 NOTE — PLAN OF CARE
Problem: Discharge Planning  Goal: Discharge to home or other facility with appropriate resources  Outcome: Progressing  Flowsheets (Taken 8/31/2022 0439 by Humberto Chang RN)  Discharge to home or other facility with appropriate resources:   Identify barriers to discharge with patient and caregiver   Arrange for interpreters to assist at discharge as needed   Arrange for needed discharge resources and transportation as appropriate   Refer to discharge planning if patient needs post-hospital services based on physician order or complex needs related to functional status, cognitive ability or social support system   Identify discharge learning needs (meds, wound care, etc)     Problem: Safety - Adult  Goal: Free from fall injury  Outcome: Progressing     Problem: Pain  Goal: Verbalizes/displays adequate comfort level or baseline comfort level  Outcome: Progressing     Problem: Chronic Conditions and Co-morbidities  Goal: Patient's chronic conditions and co-morbidity symptoms are monitored and maintained or improved  Outcome: Progressing

## 2022-08-31 NOTE — PROCEDURES
[address], [address] [place] [address]                            CARDIAC CATHETERIZATION    PATIENT NAME: Brittany Salcido                  :        1960  MED REC NO:   [id number]                          ROOM:       3101  ACCOUNT NO:   [de-identified]                           ADMIT DATE: 2022  PROVIDER:     Garnell Harada, MD    DATE OF PROCEDURE:  2022    INDICATION:  Unstable angina, abnormal stress test.    PROCEDURE:  This is a 73-year-old female patient brought to the cath lab  today. Informed consent was obtained from the patient. The patient was  prepped and draped in the usual sterile fashion. The patient was  injected with 5 mL of 2% lidocaine in the right radial artery region. Using a radial needle, the right radial artery was cannulized and a  5/6-Danish sheath was placed in the right radial artery. The entire  procedure was done using guidewire, sheath was flushed in between  procedure. The patient received significant amount of intraarterial Cardene. Then, using a multipurpose catheter, EDP was measured. EDP was around  15 mmHg present. On the pullback, there was no gradient present across  the aortic valve. The patient has a loop recorder device present. it is not working     Then using a TIG catheter, left coronary angiography was performed. The  left coronary angiogram revealed the left main is patent. It bifurcates  into LAD and circumflex artery. Circ is a medium-sized vessel, it gives off OM1 and OM2 branch, has mild  disease noted. LAD has a proximal 80-90% stenosis noted, followed by  mid 80% stenosis noted. D1 and D2 small vessels and patent. There is a  small ramus branch present, which is also patent. Right coronary artery was engaged with a TIG catheter, right coronary  angiography was performed.   The right coronary angiogram revealed the  right coronary artery has a proximal 99% stenosis noted.  present  with right to right collateral circulation present. Filling PD and PL  branch. IMPRESSION:  1. Left main is patent. 2.  LAD has a proximal 80-90% stenosis noted and mid 80% stenosis. 3.  Circ has mild disease noted. 4.  RCA has a  with right to right collateral circulation noted. 5.  EDP is around 15 mmHg present. The plan is to proceed with intervention of the LAD. The patient was anticoagulated with heparin. ACT was greater than 250. The patient received Plavix 600 mg postprocedure and aspirin 325 mg  postprocedure. Then, using a VL-3 guide, left coronary artery was  engaged. A BMW Elite wire was used to cross into the LAD. The LAD  lesion was stented both of them with a 2.5 x 15 stent in the proximal  and mid segment. Both stents were deployed at high pressure. Lesion  decreased to 0%. Final angiogram showed EDSON-3 flow noted. No  dissection, perforation or distal embolization noted. Then, using a JR-4 guide, right coronary artery was engaged. An attempt  was performed to do PCI of the right coronary artery. A Turnpike  catheter was used. A Runthrough wire and BMW Elite wire was used, what  appeared to be a . Unable to cross through the . After multiple  attempts, it was aborted at this time. IMPRESSION:  1. Left main is patent. 2.  LAD proximal and mid 80-90% stenosis reduced to 0% with drug-eluting  stents Resolute 2.5 x 15 mm stents. 3.  Circ has mild disease noted. 4.  RCA had a proximal  present with right to right collateral  circulation noted. An attempt was done to do the PCI of the right  coronary artery, unable to cross the wire. The patient tolerated the procedure well. No complications were noted. Sheath was removed in the cath lab. The patient will be admitted  overnight. Possibly discharged tomorrow.     If the patient continues to have symptoms, then we will consider PCI of  the right coronary artery. Otherwise, maximize medical treatment. Discussed with the family the need to refer to  center also if needed  for right coronary artery intervention.       Blood loss 20cc  Cardiac rehab consulted       Yancy Everett MD    D: 08/30/2022 14:23:59       T: 08/31/2022 2:53:46     [email or web address]  Job#: [id number]     Doc#: [id number]    CC:

## 2022-08-31 NOTE — DISCHARGE SUMMARY
Instructions: Follow up appointments: cardiology  Primary care physician: Jania Clark MD within 2 weeks  Diet: cardiac diet   Activity: activity as tolerated  Disposition: Discharged to:   [x]Home, []C, []SNF, []Acute Rehab, []Hospice   Condition on discharge: Stable  Labs and Tests to be Followed up as an outpatient by PCP or Specialist: none    Discharge Medications:        Medication List        START taking these medications      carvedilol 6.25 MG tablet  Commonly known as: COREG  Take 1 tablet by mouth 2 times daily (with meals)     clopidogrel 75 MG tablet  Commonly known as: PLAVIX  Take 1 tablet by mouth daily  Start taking on: September 1, 2022     HYDROcodone-acetaminophen 5-325 MG per tablet  Commonly known as: NORCO  Take 1 tablet by mouth every 6 hours as needed for Pain for up to 3 days. losartan 25 MG tablet  Commonly known as: COZAAR  Take 1 tablet by mouth daily  Start taking on: September 1, 2022     miconazole 2 % powder  Commonly known as: MICOTIN  Apply topically 2 times daily. pantoprazole 40 MG tablet  Commonly known as: PROTONIX  Take 1 tablet by mouth every morning (before breakfast)  Start taking on: September 1, 2022  Replaces: omeprazole 40 MG delayed release capsule            CHANGE how you take these medications      * ezetimibe 10 MG tablet  Commonly known as: Silvina Rivas  What changed: Another medication with the same name was added. Make sure you understand how and when to take each. * ezetimibe 10 MG tablet  Commonly known as: ZETIA  Take 1 tablet by mouth nightly  What changed: You were already taking a medication with the same name, and this prescription was added. Make sure you understand how and when to take each. * This list has 2 medication(s) that are the same as other medications prescribed for you. Read the directions carefully, and ask your doctor or other care provider to review them with you.                 CONTINUE taking these medications aspirin 81 MG tablet     b complex vitamins capsule     busPIRone 15 MG tablet  Commonly known as: BUSPAR  Take 15 mg by mouth 3 times daily     butalbital-acetaminophen-caffeine -40 MG per tablet  Commonly known as: Fioricet  Take 1 tablet by mouth every 4 hours as needed for Pain     escitalopram 20 MG tablet  Commonly known as: LEXAPRO     gabapentin 600 MG tablet  Commonly known as: NEURONTIN     Jardiance 25 MG tablet  Generic drug: empagliflozin     Levemir FlexTouch 100 UNIT/ML injection pen  Generic drug: insulin detemir     lidocaine-prilocaine 2.5-2.5 % cream  Commonly known as: EMLA  Apply topically AS NEED     melatonin 3 MG Tabs tablet     metFORMIN 500 MG extended release tablet  Commonly known as: GLUCOPHAGE-XR     NovoFine 32G X 6 MM Misc  Generic drug: Insulin Pen Needle     ondansetron 4 MG tablet  Commonly known as: ZOFRAN  Take 1 tablet by mouth daily as needed for Nausea or Vomiting     ONE TOUCH ULTRA TEST strip  Generic drug: blood glucose test strips     pioglitazone 15 MG tablet  Commonly known as: ACTOS     rosuvastatin 20 MG tablet  Commonly known as: CRESTOR     terbinafine 250 MG tablet  Commonly known as: LAMISIL     VASCEPA PO            STOP taking these medications      metoprolol tartrate 25 MG tablet  Commonly known as: LOPRESSOR     omeprazole 40 MG delayed release capsule  Commonly known as: PRILOSEC  Replaced by: pantoprazole 40 MG tablet     tetracycline 500 MG capsule  Commonly known as: ACHROMYCIN;SUMYCIN               Where to Get Your Medications        These medications were sent to [address] [place] [address] 01, [address]      Phone: [phone]   carvedilol 6.25 MG tablet  clopidogrel 75 MG tablet  ezetimibe 10 MG tablet  losartan 25 MG tablet  miconazole 2 % powder  pantoprazole 40 MG tablet       You can get these medications from any pharmacy    Bring a paper prescription for each of these medications  HYDROcodone-acetaminophen 5-325 MG per tablet        Objective Findings at Discharge:   BP (!) 121/51   Pulse 80   Temp 97.7 °F (36.5 °C) (Axillary)   Resp 16   Ht 5' 10\" (1.778 m)   Wt 258 lb (117 kg)   SpO2 96%   BMI 37.02 kg/m²       Physical Exam:   General: NAD, obese  Eyes: EOMI  ENT: neck supple  Cardiovascular: Regular rate. Respiratory: Clear to auscultation  Gastrointestinal: Soft, non tender  Genitourinary: no suprapubic tenderness  Musculoskeletal: No edema, right wrist dressing (radial artery accessed)  Skin: warm, dry  Neuro: Alert. Psych: Mood appropriate. Labs and Imaging   Echocardiogram complete 2D with doppler with color    Result Date: 8/29/2022  Transthoracic Echocardiography Report (TTE)  Demographics   Patient Name       Marlene Buck  Date of Study       08/29/2022   Date of Birth      1960         Gender              Female   Age                63 year(s)         Race                   Patient Number     [id number]         Room Number         0799   Visit Number       [id number]   Corporate ID       [id number]   Accession Number   [id number]         Primitivo Rosa ,                                                            [facility]   Ordering Physician Kerry Nava    Interpreting        Indra Juan MD                     CNP                Physician  Procedure Type of Study   TTE procedure:ECHOCARDIOGRAM COMPLETE 2D W DOPPLER W COLOR. Procedure Date Date: 08/29/2022 Start: 11:43 AM Study Location: Portable Technical Quality: Fair visualization Indications:Chest pain. Patient Status: Routine Height: 70 inches Weight: 258 pounds BSA: 2.33 m2 BMI: 37.02 kg/m2 HR: 66 bpm BP: 121/59 mmHg  Conclusions   Summary  Technically difficult study; definity used. Left ventricular systolic function is abnormal.  Ejection fraction is visually estimated at 40- 50%.   Septal bounce noted.  Mild left ventricular hypertrophy. No evidence of any pericardial effusion. Pericardial fat pad visualized. Signature   ------------------------------------------------------------------  Electronically signed by Cathy Ying MD (Interpreting  physician) on 08/29/2022 at 02:57 PM  ------------------------------------------------------------------   Findings   Left Ventricle  Left ventricular systolic function is abnormal.  Ejection fraction is visually estimated at 40-50%. Septal bounce noted. Mild left ventricular hypertrophy. Diastolic function unable to be determined due to technically difficult  study. Left Atrium  Essentially normal left atrium. Right Atrium  Right atrium is not clearly visualized. Right Ventricle  The right ventricle was not clearly visualized. Aortic Valve  Individual aortic valve leaflets are not clearly visualized; however,  doppler evaluation does not suggest significant regurgitation or stenosis. Mitral Valve  Structurally normal mitral valve. Tricuspid Valve  Tricuspid valve was not well visualized. Trace tricuspid regurgitation; normal RVSP. Pulmonic Valve  The pulmonic valve was not well visualized. Pericardial Effusion  No evidence of any pericardial effusion. Pleural Effusion  No evidence of pleural effusion. Miscellaneous  Pericardial fat pad visualized.   M-Mode/2D Measurements & Calculations   LV Diastolic Dimension:   LV Systolic Dimension:   LA Dimension: 4.1 cmAO  4.55 cm                   3.55 cm                  Root Dimension: 2.9 cm  LV FS:22 %                LV Volume Diastolic: 723  LV PW Diastolic: 4.67 cm  ml  Septum Diastolic: 0.80 cm LV Volume Systolic: 79  CO: 4.72 l/min            ml  CI: 1.82 l/m*m2           LV EDV/LV EDV Index: 129 LA/Aorta: 1.41                            ml/55 m2LV ESV/LV ESV  LV Area Diastolic: 89.1   Index: 79 ml/34 m2  cm2                       EF Calculated (A4C):  LV Area Systolic: 29 cm2  10.2 % EF Calculated (2D): 44.6                            %                             LV Length: 9.77 cm                             LVOT: 2.2 cm  Doppler Measurements & Calculations   MV Peak E-Wave: 76.5    AV Peak Velocity: 94 cm/s    LVOT Peak Velocity:  cm/s                    AV Peak Gradient: 3.53 mmHg  77.5 cm/s  MV Peak A-Wave: 82.9    AV Mean Velocity: 65.9 cm/s  LVOT Mean Velocity:  cm/s                    AV Mean Gradient: 2 mmHg     49.8 cm/s  MV E/A Ratio: 0.92      AV VTI: 17.7 cm              LVOT Peak Gradient: 3  MV Peak Gradient: 2.34  AV Area (Continuity):3.63    mmHgLVOT Mean Gradient:  mmHg                    cm2                          1 mmHg                           LVOT VTI: 16.9 cm                                                        PV Peak Velocity: 85.5                                                       cm/s                                                       PV Peak Gradient: 2.92                                                       mmHg      XR CHEST PORTABLE    Result Date: 8/28/2022  EXAMINATION: ONE XRAY VIEW OF THE CHEST 8/28/2022 9:16 pm COMPARISON: Chest x-ray November 16, 2016; CT chest November 16, 2016 HISTORY: ORDERING SYSTEM PROVIDED HISTORY: chest pain TECHNOLOGIST PROVIDED HISTORY: Reason for exam:->chest pain Reason for Exam: chest pain Additional signs and symptoms: SOB Relevant Medical/Surgical History: asthma FINDINGS: Cardiac silhouette is stable. No focal consolidation, pleural effusion, or pneumothorax. Cardiac loop recorder in place. Osseous structures are intact. 1. No acute cardiopulmonary process identified. CTA PULMONARY W CONTRAST    Result Date: 8/29/2022  EXAMINATION: CTA OF THE CHEST 8/29/2022 3:48 pm TECHNIQUE: CTA of the chest was performed after the administration of intravenous contrast.  Multiplanar reformatted images are provided for review. MIP images are provided for review.  Automated exposure control, iterative reconstruction, and/or weight based adjustment of the mA/kV was utilized to reduce the radiation dose to as low as reasonably achievable. COMPARISON: November 16, 2016 HISTORY: ORDERING SYSTEM PROVIDED HISTORY: chest pain TECHNOLOGIST PROVIDED HISTORY: Reason for exam:->chest pain Reason for Exam: chest pain FINDINGS: Pulmonary Arteries: Pulmonary arteries are adequately opacified for evaluation. No evidence of intraluminal filling defect to suggest pulmonary embolism. Main pulmonary artery is normal in caliber. Mediastinum: No evidence of mediastinal lymphadenopathy. The heart and pericardium demonstrate no acute abnormality. There is no acute abnormality of the thoracic aorta. Lungs/pleura: The lungs are without acute process. No focal consolidation or pulmonary edema. No evidence of pleural effusion or pneumothorax. Upper Abdomen: Limited images of the upper abdomen are unremarkable. Soft Tissues/Bones: No acute bone or soft tissue abnormality. No evidence of pulmonary embolism or acute pulmonary abnormality. NM MYOCARDIAL SPECT REST EXERCISE OR RX    Result Date: 8/30/2022  Cardiac Perfusion Imaging   Demographics   Patient Name      Callie Lieberman  Date of study        08/30/2022   Date of Birth     1960         Gender               Female   Age               63 year(s)         Race                    Patient Number    [id number]         Room Number          9562   Visit Number      [id number]          Height               70 inches   Corporate ID      [id number]           Weight               258 pounds   Accession Number  [id number]                                        NM Technologist      Carolina Sharp MD   Interpreting         Jamie Banks MD  Physician                            Cardiologist   Conclusions   Summary  No EKG changes suggestive of ischemia with Lexiscan infusion.   Severe adam-apical ischemia noted  normal perfusion in the septal/lateral and inferior wall--noted  clinical correlation needed  gating shows EF 41%   Signatures   ------------------------------------------------------------------  Electronically signed by Serge Justice MD (Interpreting  cardiologist) on 08/30/2022 at 12:26  ------------------------------------------------------------------  Procedure Procedure Type:   Nuclear Stress Test:Pharmacological, Myocardial Perfusion Imaging with  Pharm, NM MYOCARDIAL SPECT REST EXERCISE OR RX  Indications: Chest pain. Risk Factors   The patient risk factors include:hypercholesterolemia, hypertension and  diabetes mellitus. Stress Protocols   Resting ECG  Normal sinus rhythm. Resting HR:67 bpm  Resting BP:141/66 mmHg  Stress Protocol:Pharmacologic - Lexiscan  Peak HR:86 bpm                      HR/BP product:61154  Peak BP:141/66 mmHg  Predicted HR: 160 bpm  % of predicted HR: 54   Exercise duration: 01:02 min  Reason for termination:Completed   Symptoms  No symptoms with Lexiscan infusion. Stress Interpretation  No EKG changes suggestive of ischemia with Lexiscan infusion. Procedure Medications   - Lexiscan I.V. bolus (over 15sec.) 0.4 mg admininstered @ 08/30/2022 10:15. Imaging Protocols   Rest                             Stress   Isotope:Sestamibi 99mTc          Isotope: Sestamibi 99mTc  Isotope dose:10.5 mCi            Isotope dose:30.9 mCi  Administration route: I.V. Administration route: I.V. Injection Date:08/30/2022 08:25  Injection Date:08/30/2022 10:15  Scan Date:08/30/2022 09:25       Scan Date:08/30/2022 11:15   Technique:        SPECT          Technique:        Gated                                                     SPECT   Procedure Description   Upon patient arrival, the patient is identified using two identifiers and  the physician order is verified. An IV is established and 8-11mCi of 99mTc  Sestamibi is intravenously injected and followed with 10mL 0.9% Normal  Saline flush.  A circulation period of 45 minutes occurs prior to resting  SPECT imaging. After imaging is complete the patient is escorted to the  stress lab. The patient is connected to the ECG and blood pressure is  measured. The RN starts the stress portion of the exam and rapidly  intravenously injects Lexiscan (regadenosine) 0.4mg over a period of 10 to15  seconds and follows with 5mL 0.9% Normal Saline flush. Immediately following  the Nuclear Technologist intravenously injects 22-33mCi of 99mTc Sestamibi  and 5mL 0.9% Normal Saline flush. After completion, recovery, and removal of  the IV, the patient rests during the second circulation period of 45  minutes. Final stress SPECT gated imaging is performed. The patient may  return home or to their room after stress imaging. The images are processed  and final charting is completed and sent to the appropriate cardiologist for  interpretation and reporting. Perfusion Interpretation   Severe adam-apical ischemia noted  normal perfusion in the septal/lateral and inferior  wall--noted  clinical correlation needed  gating shows EF 41%  Imaging Results    Summed scores     - Summed stress score: 20     - Summed rest score: 2     - Summed difference score:    18   Rest ejection  Ejection fraction:41 %  EDV :122 ml  ESV :72 ml  Stroke volume :50 ml  Medical History   Accession#:  [id number]  Admission Data Admission date: 08/28/2022 Admission Time: [facility] Status: Inpatient.       CBC:   Recent Labs     08/28/22 2023 08/31/22  0039   WBC 11.2* 8.3   HGB 13.8 11.9*    256     BMP:    Recent Labs     08/28/22 2023 08/31/22  0039    140   K 4.3 4.1   CL 99 105   CO2 24 27   BUN 14 13   CREATININE 0.6 0.5*   GLUCOSE 249* 111*     Hepatic:   Recent Labs     08/28/22 2023   AST 17   ALT 17   BILITOT 0.2   ALKPHOS 124     Lipids: No results found for: CHOL, HDL, TRIG  Hemoglobin A1C:   Lab Results   Component Value Date/Time    LABA1C 9.4 08/29/2022 01:30 AM     TSH: No results found for: TSH  Troponin:   Lab Results   Component Value Date/Time    TROPONINT <0.010 08/29/2022 08:20 AM    TROPONINT 0.012 08/29/2022 12:14 AM    TROPONINT <0.010 08/28/2022 08:23 PM     Lactic Acid: No results for input(s): LACTA in the last 72 hours.   BNP:   Recent Labs     08/28/22 2023   PROBNP 276.4     UA:No results found for: [place], PHUR, LABCAST, WBCUA, RBCUA, MUCUS, TRICHOMONAS, YEAST, BACTERIA, CLARITYU, SPECGRAV, LEUKOCYTESUR, UROBILINOGEN, BILIRUBINUR, BLOODU, GLUCOSEU, KETUA, AMORPHOUS  Urine Cultures: No results found for: LABURIN  Blood Cultures: No results found for: BC  No results found for: BLOODCULT2  Organism: No results found for: ORG    Time Spent Discharging patient 45 minutes    Electronically signed by Lanre Alonso MD on 8/31/2022 at 4:21 PM

## 2023-02-01 ENCOUNTER — HOSPITAL ENCOUNTER (OUTPATIENT)
Dept: CARDIAC REHAB | Age: 63
Setting detail: THERAPIES SERIES
Discharge: HOME | End: 2023-02-01
Payer: COMMERCIAL

## 2023-02-01 PROCEDURE — G0422 INTENS CARDIAC REHAB W/EXERC: HCPCS

## 2023-02-01 PROCEDURE — G0423 INTENS CARDIAC REHAB NO EXER: HCPCS

## 2023-02-06 ENCOUNTER — APPOINTMENT (OUTPATIENT)
Dept: CARDIAC REHAB | Age: 63
End: 2023-02-06
Payer: COMMERCIAL

## 2023-02-07 ENCOUNTER — APPOINTMENT (OUTPATIENT)
Dept: CARDIAC REHAB | Age: 63
End: 2023-02-07
Payer: COMMERCIAL

## 2023-02-09 ENCOUNTER — APPOINTMENT (OUTPATIENT)
Dept: CARDIAC REHAB | Age: 63
End: 2023-02-09
Payer: COMMERCIAL

## 2023-02-13 ENCOUNTER — APPOINTMENT (OUTPATIENT)
Dept: CARDIAC REHAB | Age: 63
End: 2023-02-13
Payer: COMMERCIAL

## 2023-02-14 ENCOUNTER — APPOINTMENT (OUTPATIENT)
Dept: CARDIAC REHAB | Age: 63
End: 2023-02-14
Payer: COMMERCIAL

## 2023-02-16 ENCOUNTER — APPOINTMENT (OUTPATIENT)
Dept: CARDIAC REHAB | Age: 63
End: 2023-02-16
Payer: COMMERCIAL

## 2023-02-20 ENCOUNTER — APPOINTMENT (OUTPATIENT)
Dept: CARDIAC REHAB | Age: 63
End: 2023-02-20
Payer: COMMERCIAL

## 2023-02-21 ENCOUNTER — APPOINTMENT (OUTPATIENT)
Dept: CARDIAC REHAB | Age: 63
End: 2023-02-21
Payer: COMMERCIAL

## 2023-02-23 ENCOUNTER — APPOINTMENT (OUTPATIENT)
Dept: CARDIAC REHAB | Age: 63
End: 2023-02-23
Payer: COMMERCIAL

## 2023-02-27 ENCOUNTER — APPOINTMENT (OUTPATIENT)
Dept: CARDIAC REHAB | Age: 63
End: 2023-02-27
Payer: COMMERCIAL

## 2023-02-28 ENCOUNTER — APPOINTMENT (OUTPATIENT)
Dept: CARDIAC REHAB | Age: 63
End: 2023-02-28
Payer: COMMERCIAL

## 2024-08-10 ENCOUNTER — APPOINTMENT (OUTPATIENT)
Dept: GENERAL RADIOLOGY | Age: 64
End: 2024-08-10
Payer: OTHER MISCELLANEOUS

## 2024-08-10 ENCOUNTER — APPOINTMENT (OUTPATIENT)
Dept: CT IMAGING | Age: 64
End: 2024-08-10
Payer: OTHER MISCELLANEOUS

## 2024-08-10 ENCOUNTER — HOSPITAL ENCOUNTER (EMERGENCY)
Age: 64
Discharge: ANOTHER HOSPITAL | End: 2024-08-11
Attending: EMERGENCY MEDICINE
Payer: OTHER MISCELLANEOUS

## 2024-08-10 DIAGNOSIS — S42.212A CLOSED FRACTURE OF NECK OF LEFT HUMERUS, INITIAL ENCOUNTER: Primary | ICD-10-CM

## 2024-08-10 DIAGNOSIS — S50.01XA CONTUSION OF RIGHT ELBOW, INITIAL ENCOUNTER: ICD-10-CM
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
## 2024-08-10 DIAGNOSIS — S82.831A CLOSED FRACTURE OF PROXIMAL END OF RIGHT FIBULA, UNSPECIFIED FRACTURE MORPHOLOGY, INITIAL ENCOUNTER: ICD-10-CM

## 2024-08-10 LAB
ALBUMIN SERPL-MCNC: 3.7 GM/DL (ref 3.4–5)
ALP BLD-CCNC: 85 IU/L (ref 40–129)
ALT SERPL-CCNC: 17 U/L (ref 10–40)
ANION GAP SERPL CALCULATED.3IONS-SCNC: 16 MMOL/L (ref 7–16)
AST SERPL-CCNC: 28 IU/L (ref 15–37)
BASOPHILS ABSOLUTE: 0.1 K/CU MM
BASOPHILS RELATIVE PERCENT: 0.6 % (ref 0–1)
BILIRUB SERPL-MCNC: 0.2 MG/DL (ref 0–1)
BUN SERPL-MCNC: 21 MG/DL (ref 6–23)
CALCIUM SERPL-MCNC: 8.9 MG/DL (ref 8.3–10.6)
CHLORIDE BLD-SCNC: 102 MMOL/L (ref 99–110)
CO2: 20 MMOL/L (ref 21–32)
CREAT SERPL-MCNC: 0.6 MG/DL (ref 0.6–1.1)
DIFFERENTIAL TYPE: ABNORMAL
EOSINOPHILS ABSOLUTE: 0.3 K/CU MM
EOSINOPHILS RELATIVE PERCENT: 2.4 % (ref 0–3)
GFR, ESTIMATED: >90 ML/MIN/1.73M2
GLUCOSE BLD-MCNC: 228 MG/DL (ref 70–99)
GLUCOSE SERPL-MCNC: 294 MG/DL (ref 70–99)
HCT VFR BLD CALC: 39.5 % (ref 37–47)
HEMOGLOBIN: 12.8 GM/DL (ref 12.5–16)
IMMATURE NEUTROPHIL %: 0.5 % (ref 0–0.43)
LYMPHOCYTES ABSOLUTE: 2.5 K/CU MM
LYMPHOCYTES RELATIVE PERCENT: 22 % (ref 24–44)
MAGNESIUM: 2 MG/DL (ref 1.8–2.4)
MCH RBC QN AUTO: 29 PG (ref 27–31)
MCHC RBC AUTO-ENTMCNC: 32.4 % (ref 32–36)
MCV RBC AUTO: 89.4 FL (ref 78–100)
MONOCYTES ABSOLUTE: 0.8 K/CU MM
MONOCYTES RELATIVE PERCENT: 7.2 % (ref 0–4)
NEUTROPHILS ABSOLUTE: 7.6 K/CU MM
NEUTROPHILS RELATIVE PERCENT: 67.3 % (ref 36–66)
PDW BLD-RTO: 14.6 % (ref 11.7–14.9)
PLATELET # BLD: 331 K/CU MM (ref 140–440)
PMV BLD AUTO: 10 FL (ref 7.5–11.1)
POTASSIUM SERPL-SCNC: 5 MMOL/L (ref 3.5–5.1)
RBC # BLD: 4.42 M/CU MM (ref 4.2–5.4)
SODIUM BLD-SCNC: 138 MMOL/L (ref 135–145)
TOTAL CK: 37 IU/L (ref 26–140)
TOTAL IMMATURE NEUTOROPHIL: 0.06 K/CU MM
TOTAL PROTEIN: 6.9 GM/DL (ref 6.4–8.2)
WBC # BLD: 11.4 K/CU MM (ref 4–10.5)

## 2024-08-10 PROCEDURE — 6360000002 HC RX W HCPCS: Performed by: EMERGENCY MEDICINE

## 2024-08-10 PROCEDURE — 73090 X-RAY EXAM OF FOREARM: CPT

## 2024-08-10 PROCEDURE — 73060 X-RAY EXAM OF HUMERUS: CPT

## 2024-08-10 PROCEDURE — 99285 EMERGENCY DEPT VISIT HI MDM: CPT

## 2024-08-10 PROCEDURE — 6360000004 HC RX CONTRAST MEDICATION: Performed by: EMERGENCY MEDICINE

## 2024-08-10 PROCEDURE — 83735 ASSAY OF MAGNESIUM: CPT

## 2024-08-10 PROCEDURE — 71275 CT ANGIOGRAPHY CHEST: CPT

## 2024-08-10 PROCEDURE — 73070 X-RAY EXAM OF ELBOW: CPT

## 2024-08-10 PROCEDURE — 72125 CT NECK SPINE W/O DYE: CPT

## 2024-08-10 PROCEDURE — 96375 TX/PRO/DX INJ NEW DRUG ADDON: CPT

## 2024-08-10 PROCEDURE — 73590 X-RAY EXAM OF LOWER LEG: CPT

## 2024-08-10 PROCEDURE — 96374 THER/PROPH/DIAG INJ IV PUSH: CPT

## 2024-08-10 PROCEDURE — 82550 ASSAY OF CK (CPK): CPT

## 2024-08-10 PROCEDURE — 85025 COMPLETE CBC W/AUTO DIFF WBC: CPT

## 2024-08-10 PROCEDURE — 73030 X-RAY EXAM OF SHOULDER: CPT

## 2024-08-10 PROCEDURE — 80053 COMPREHEN METABOLIC PANEL: CPT

## 2024-08-10 PROCEDURE — 90715 TDAP VACCINE 7 YRS/> IM: CPT | Performed by: EMERGENCY MEDICINE

## 2024-08-10 PROCEDURE — 82962 GLUCOSE BLOOD TEST: CPT

## 2024-08-10 PROCEDURE — 6370000000 HC RX 637 (ALT 250 FOR IP): Performed by: EMERGENCY MEDICINE

## 2024-08-10 PROCEDURE — 83605 ASSAY OF LACTIC ACID: CPT

## 2024-08-10 PROCEDURE — 71045 X-RAY EXAM CHEST 1 VIEW: CPT

## 2024-08-10 PROCEDURE — 2580000003 HC RX 258: Performed by: EMERGENCY MEDICINE

## 2024-08-10 PROCEDURE — 90471 IMMUNIZATION ADMIN: CPT | Performed by: EMERGENCY MEDICINE

## 2024-08-10 PROCEDURE — 70450 CT HEAD/BRAIN W/O DYE: CPT

## 2024-08-10 PROCEDURE — 96376 TX/PRO/DX INJ SAME DRUG ADON: CPT

## 2024-08-10 RX ORDER — NAPROXEN 500 MG/1
500 TABLET ORAL 2 TIMES DAILY
Qty: 60 TABLET | Refills: 0 | Status: SHIPPED | OUTPATIENT
Start: 2024-08-10

## 2024-08-10 RX ORDER — MORPHINE SULFATE 4 MG/ML
4 INJECTION, SOLUTION INTRAMUSCULAR; INTRAVENOUS EVERY 30 MIN PRN
Status: DISCONTINUED | OUTPATIENT
Start: 2024-08-10 | End: 2024-08-11 | Stop reason: HOSPADM

## 2024-08-10 RX ORDER — LIDOCAINE 4 G/G
1 PATCH TOPICAL DAILY
Qty: 30 PATCH | Refills: 0 | Status: SHIPPED | OUTPATIENT
Start: 2024-08-10 | End: 2024-09-09

## 2024-08-10 RX ORDER — OXYCODONE HYDROCHLORIDE AND ACETAMINOPHEN 5; 325 MG/1; MG/1
1 TABLET ORAL EVERY 6 HOURS PRN
Qty: 12 TABLET | Refills: 0 | Status: SHIPPED | OUTPATIENT
Start: 2024-08-10 | End: 2024-08-13

## 2024-08-10 RX ORDER — OXYCODONE HYDROCHLORIDE AND ACETAMINOPHEN 5; 325 MG/1; MG/1
2 TABLET ORAL ONCE
Status: COMPLETED | OUTPATIENT
Start: 2024-08-10 | End: 2024-08-10

## 2024-08-10 RX ORDER — ONDANSETRON 4 MG/1
4 TABLET, ORALLY DISINTEGRATING ORAL 3 TIMES DAILY PRN
Qty: 21 TABLET | Refills: 0 | Status: SHIPPED | OUTPATIENT
Start: 2024-08-10

## 2024-08-10 RX ORDER — LIDOCAINE 4 G/G
1 PATCH TOPICAL DAILY
Status: DISCONTINUED | OUTPATIENT
Start: 2024-08-10 | End: 2024-08-11 | Stop reason: HOSPADM

## 2024-08-10 RX ORDER — 0.9 % SODIUM CHLORIDE 0.9 %
1000 INTRAVENOUS SOLUTION INTRAVENOUS ONCE
Status: COMPLETED | OUTPATIENT
Start: 2024-08-10 | End: 2024-08-10

## 2024-08-10 RX ORDER — ONDANSETRON 2 MG/ML
4 INJECTION INTRAMUSCULAR; INTRAVENOUS EVERY 30 MIN PRN
Status: DISCONTINUED | OUTPATIENT
Start: 2024-08-10 | End: 2024-08-11 | Stop reason: HOSPADM

## 2024-08-10 RX ADMIN — TETANUS TOXOID, REDUCED DIPHTHERIA TOXOID AND ACELLULAR PERTUSSIS VACCINE, ADSORBED 0.5 ML: 5; 2.5; 8; 8; 2.5 SUSPENSION INTRAMUSCULAR at 19:28

## 2024-08-10 RX ADMIN — IOPAMIDOL 100 ML: 755 INJECTION, SOLUTION INTRAVENOUS at 20:12

## 2024-08-10 RX ADMIN — MORPHINE SULFATE 4 MG: 4 INJECTION, SOLUTION INTRAMUSCULAR; INTRAVENOUS at 17:37

## 2024-08-10 RX ADMIN — SODIUM CHLORIDE 1000 ML: 9 INJECTION, SOLUTION INTRAVENOUS at 17:02

## 2024-08-10 RX ADMIN — MORPHINE SULFATE 4 MG: 4 INJECTION, SOLUTION INTRAMUSCULAR; INTRAVENOUS at 16:49

## 2024-08-10 RX ADMIN — HYDROMORPHONE HYDROCHLORIDE 0.5 MG: 0.5 INJECTION, SOLUTION INTRAMUSCULAR; INTRAVENOUS; SUBCUTANEOUS at 22:13

## 2024-08-10 RX ADMIN — OXYCODONE HYDROCHLORIDE AND ACETAMINOPHEN 2 TABLET: 5; 325 TABLET ORAL at 20:17

## 2024-08-10 RX ADMIN — MORPHINE SULFATE 4 MG: 4 INJECTION, SOLUTION INTRAMUSCULAR; INTRAVENOUS at 19:27

## 2024-08-10 ASSESSMENT — PAIN - FUNCTIONAL ASSESSMENT
PAIN_FUNCTIONAL_ASSESSMENT: ACTIVITIES ARE NOT PREVENTED
PAIN_FUNCTIONAL_ASSESSMENT: PREVENTS OR INTERFERES SOME ACTIVE ACTIVITIES AND ADLS
PAIN_FUNCTIONAL_ASSESSMENT: 0-10
PAIN_FUNCTIONAL_ASSESSMENT: PREVENTS OR INTERFERES SOME ACTIVE ACTIVITIES AND ADLS
PAIN_FUNCTIONAL_ASSESSMENT: PREVENTS OR INTERFERES WITH ALL ACTIVE AND SOME PASSIVE ACTIVITIES
PAIN_FUNCTIONAL_ASSESSMENT: PREVENTS OR INTERFERES SOME ACTIVE ACTIVITIES AND ADLS
PAIN_FUNCTIONAL_ASSESSMENT: 0-10
PAIN_FUNCTIONAL_ASSESSMENT: ACTIVITIES ARE NOT PREVENTED
PAIN_FUNCTIONAL_ASSESSMENT: PREVENTS OR INTERFERES WITH ALL ACTIVE AND SOME PASSIVE ACTIVITIES

## 2024-08-10 ASSESSMENT — PAIN DESCRIPTION - LOCATION
LOCATION: SHOULDER
LOCATION: SHOULDER;LEG
LOCATION: SHOULDER
LOCATION: SHOULDER;LEG
LOCATION: SHOULDER
LOCATION: SHOULDER

## 2024-08-10 ASSESSMENT — PAIN DESCRIPTION - DESCRIPTORS
DESCRIPTORS: SHARP
DESCRIPTORS: SHARP
DESCRIPTORS: ACHING;SHARP
DESCRIPTORS: SHARP
DESCRIPTORS: DISCOMFORT
DESCRIPTORS: ACHING;DISCOMFORT
DESCRIPTORS: ACHING;DISCOMFORT
DESCRIPTORS: SHARP

## 2024-08-10 ASSESSMENT — LIFESTYLE VARIABLES
HOW OFTEN DO YOU HAVE A DRINK CONTAINING ALCOHOL: NEVER
HOW MANY STANDARD DRINKS CONTAINING ALCOHOL DO YOU HAVE ON A TYPICAL DAY: PATIENT DOES NOT DRINK

## 2024-08-10 ASSESSMENT — PAIN SCALES - GENERAL
PAINLEVEL_OUTOF10: 10
PAINLEVEL_OUTOF10: 10
PAINLEVEL_OUTOF10: 7
PAINLEVEL_OUTOF10: 10
PAINLEVEL_OUTOF10: 10
PAINLEVEL_OUTOF10: 8
PAINLEVEL_OUTOF10: 10
PAINLEVEL_OUTOF10: 8
PAINLEVEL_OUTOF10: 8

## 2024-08-10 ASSESSMENT — PAIN DESCRIPTION - ORIENTATION
ORIENTATION: LEFT
ORIENTATION: LOWER
ORIENTATION: LEFT
ORIENTATION: LEFT
ORIENTATION: RIGHT
ORIENTATION: LEFT

## 2024-08-10 ASSESSMENT — PAIN DESCRIPTION - PAIN TYPE
TYPE: ACUTE PAIN
TYPE: ACUTE PAIN

## 2024-08-10 ASSESSMENT — PAIN DESCRIPTION - FREQUENCY: FREQUENCY: CONTINUOUS

## 2024-08-10 ASSESSMENT — ENCOUNTER SYMPTOMS
EYES NEGATIVE: 1
RESPIRATORY NEGATIVE: 1
ALLERGIC/IMMUNOLOGIC NEGATIVE: 1
GASTROINTESTINAL NEGATIVE: 1

## 2024-08-10 ASSESSMENT — PAIN DESCRIPTION - ONSET: ONSET: SUDDEN
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
## 2024-08-11 VITALS
DIASTOLIC BLOOD PRESSURE: 81 MMHG | OXYGEN SATURATION: 94 % | RESPIRATION RATE: 16 BRPM | BODY MASS INDEX: 39.37 KG/M2 | TEMPERATURE: 97.8 F | HEART RATE: 112 BPM | SYSTOLIC BLOOD PRESSURE: 149 MMHG | HEIGHT: 70 IN | WEIGHT: 275 LBS

## 2024-08-11 PROCEDURE — 6360000002 HC RX W HCPCS: Performed by: EMERGENCY MEDICINE

## 2024-08-11 PROCEDURE — 96376 TX/PRO/DX INJ SAME DRUG ADON: CPT

## 2024-08-11 RX ADMIN — HYDROMORPHONE HYDROCHLORIDE 0.5 MG: 0.5 INJECTION, SOLUTION INTRAMUSCULAR; INTRAVENOUS; SUBCUTANEOUS at 00:01

## 2024-08-11 ASSESSMENT — PAIN DESCRIPTION - ORIENTATION: ORIENTATION: LEFT

## 2024-08-11 ASSESSMENT — PAIN SCALES - GENERAL: PAINLEVEL_OUTOF10: 8

## 2024-08-11 ASSESSMENT — PAIN DESCRIPTION - DESCRIPTORS: DESCRIPTORS: SHARP

## 2024-08-11 ASSESSMENT — PAIN - FUNCTIONAL ASSESSMENT: PAIN_FUNCTIONAL_ASSESSMENT: PREVENTS OR INTERFERES SOME ACTIVE ACTIVITIES AND ADLS

## 2024-08-11 ASSESSMENT — PAIN DESCRIPTION - LOCATION: LOCATION: SHOULDER
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
## 2024-08-15 ENCOUNTER — HOSPITAL ENCOUNTER (OUTPATIENT)
Age: 64
Setting detail: SPECIMEN
Discharge: HOME | End: 2024-08-15

## 2024-08-15 LAB
ALBUMIN SERPL-MCNC: 3.7 GM/DL (ref 3.4–5)
ALP BLD-CCNC: 99 IU/L (ref 40–128)
ALT SERPL-CCNC: 14 U/L (ref 10–40)
ANION GAP SERPL CALCULATED.3IONS-SCNC: 13 MMOL/L (ref 7–16)
AST SERPL-CCNC: 19 IU/L (ref 15–37)
BASOPHILS ABSOLUTE: 0.1 K/CU MM
BASOPHILS RELATIVE PERCENT: 0.6 % (ref 0–1)
BILIRUB SERPL-MCNC: 0.3 MG/DL (ref 0–1)
BUN SERPL-MCNC: 16 MG/DL (ref 6–23)
CALCIUM SERPL-MCNC: 9 MG/DL (ref 8.3–10.6)
CHLORIDE BLD-SCNC: 99 MMOL/L (ref 99–110)
CO2: 27 MMOL/L (ref 21–32)
CREAT SERPL-MCNC: 0.4 MG/DL (ref 0.6–1.1)
DIFFERENTIAL TYPE: ABNORMAL
EOSINOPHILS ABSOLUTE: 0.3 K/CU MM
EOSINOPHILS RELATIVE PERCENT: 3.3 % (ref 0–3)
GFR, ESTIMATED: >90 ML/MIN/1.73M2
GLUCOSE SERPL-MCNC: 124 MG/DL (ref 70–99)
HCT VFR BLD CALC: 30.9 % (ref 37–47)
HEMOGLOBIN: 9.9 GM/DL (ref 12.5–16)
IMMATURE NEUTROPHIL %: 0.4 % (ref 0–0.43)
LYMPHOCYTES ABSOLUTE: 2.8 K/CU MM
LYMPHOCYTES RELATIVE PERCENT: 32.1 % (ref 24–44)
MCH RBC QN AUTO: 29.5 PG (ref 27–31)
MCHC RBC AUTO-ENTMCNC: 32 % (ref 32–36)
MCV RBC AUTO: 92 FL (ref 78–100)
MONOCYTES ABSOLUTE: 0.8 K/CU MM
MONOCYTES RELATIVE PERCENT: 9.5 % (ref 0–4)
NEUTROPHILS ABSOLUTE: 4.6 K/CU MM
NEUTROPHILS RELATIVE PERCENT: 54.1 % (ref 36–66)
NUCLEATED RBC %: 0 %
PDW BLD-RTO: 14.6 % (ref 11.7–14.9)
PLATELET # BLD: 291 K/CU MM (ref 140–440)
PMV BLD AUTO: 9.9 FL (ref 7.5–11.1)
POTASSIUM SERPL-SCNC: 4.1 MMOL/L (ref 3.5–5.1)
RBC # BLD: 3.36 M/CU MM (ref 4.2–5.4)
SODIUM BLD-SCNC: 139 MMOL/L (ref 135–145)
TOTAL IMMATURE NEUTOROPHIL: 0.03 K/CU MM
TOTAL NUCLEATED RBC: 0 K/CU MM
TOTAL PROTEIN: 6.8 GM/DL (ref 6.4–8.2)
WBC # BLD: 8.6 K/CU MM (ref 4–10.5)

## 2024-08-15 PROCEDURE — 36415 COLL VENOUS BLD VENIPUNCTURE: CPT

## 2024-08-15 PROCEDURE — 85025 COMPLETE CBC W/AUTO DIFF WBC: CPT

## 2024-08-15 PROCEDURE — 80053 COMPREHEN METABOLIC PANEL: CPT

## 2024-09-05 ENCOUNTER — OFFICE VISIT (OUTPATIENT)
Dept: ORTHOPEDIC SURGERY | Age: 64
End: 2024-09-05

## 2024-09-05 VITALS — HEART RATE: 91 BPM | OXYGEN SATURATION: 95 %

## 2024-09-05 DIAGNOSIS — S82.831A CLOSED FRACTURE OF HEAD OF RIGHT FIBULA, INITIAL ENCOUNTER: ICD-10-CM

## 2024-09-05 DIAGNOSIS — S42.202A CLOSED FRACTURE OF PROXIMAL END OF LEFT HUMERUS, UNSPECIFIED FRACTURE MORPHOLOGY, INITIAL ENCOUNTER: Primary | ICD-10-CM

## 2024-09-05 PROCEDURE — 99204 OFFICE O/P NEW MOD 45 MIN: CPT

## 2024-09-05 RX ORDER — HYDROCODONE BITARTRATE AND ACETAMINOPHEN 5; 325 MG/1; MG/1
1 TABLET ORAL EVERY 8 HOURS PRN
Qty: 21 TABLET | Refills: 0 | Status: SHIPPED | OUTPATIENT
Start: 2024-09-05 | End: 2024-09-12

## 2024-09-05 ASSESSMENT — ENCOUNTER SYMPTOMS
SHORTNESS OF BREATH: 0
RHINORRHEA: 0
COUGH: 0
NAUSEA: 0
FACIAL SWELLING: 0
BACK PAIN: 0

## 2024-09-05 NOTE — PATIENT INSTRUCTIONS
Brace given for right knee   Follow up in 4 weeks   Out patient Physical Therapy has been ordered by your provider. [facility] will call you to set up therapy. If you have not heard from them within 24-48 hours of today's appointment, please reach out to them at [phone].      We are committed to providing you the best care possible.     If you receive a survey after visiting one of our offices, please take time to share your experience concerning your physician office visit.  These surveys are confidential and no health information about you is shared.     We are eager to improve for you and we are counting on your feedback to help make that happen. If you felt my care was outstanding, please mention me by name: Eden
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
## 2024-09-09 ENCOUNTER — HOSPITAL ENCOUNTER (OUTPATIENT)
Dept: PHYSICAL THERAPY | Age: 64
Setting detail: THERAPIES SERIES
Discharge: HOME | End: 2024-09-09
Payer: COMMERCIAL

## 2024-09-09 PROCEDURE — 97016 VASOPNEUMATIC DEVICE THERAPY: CPT

## 2024-09-09 PROCEDURE — 97110 THERAPEUTIC EXERCISES: CPT

## 2024-09-09 PROCEDURE — 97161 PT EVAL LOW COMPLEX 20 MIN: CPT

## 2024-09-10 ENCOUNTER — TELEPHONE (OUTPATIENT)
Dept: ORTHOPEDIC SURGERY | Age: 64
End: 2024-09-10

## 2024-09-11 DIAGNOSIS — S42.202A CLOSED FRACTURE OF PROXIMAL END OF LEFT HUMERUS, UNSPECIFIED FRACTURE MORPHOLOGY, INITIAL ENCOUNTER: Primary | ICD-10-CM

## 2024-09-11 RX ORDER — CYCLOBENZAPRINE HCL 10 MG
10 TABLET ORAL 3 TIMES DAILY PRN
Qty: 21 TABLET | Refills: 0 | Status: SHIPPED | OUTPATIENT
Start: 2024-09-11 | End: 2024-09-21

## 2024-09-12 ENCOUNTER — HOSPITAL ENCOUNTER (OUTPATIENT)
Dept: PHYSICAL THERAPY | Age: 64
Setting detail: THERAPIES SERIES
Discharge: HOME | End: 2024-09-12
Payer: COMMERCIAL

## 2024-09-16 ENCOUNTER — HOSPITAL ENCOUNTER (OUTPATIENT)
Dept: PHYSICAL THERAPY | Age: 64
Setting detail: THERAPIES SERIES
Discharge: HOME | End: 2024-09-16
Payer: COMMERCIAL

## 2024-09-16 PROCEDURE — 97016 VASOPNEUMATIC DEVICE THERAPY: CPT

## 2024-09-16 PROCEDURE — 97140 MANUAL THERAPY 1/> REGIONS: CPT

## 2024-09-19 ENCOUNTER — HOSPITAL ENCOUNTER (OUTPATIENT)
Dept: PHYSICAL THERAPY | Age: 64
Setting detail: THERAPIES SERIES
Discharge: HOME | End: 2024-09-19
Payer: COMMERCIAL

## 2024-09-19 PROCEDURE — 97140 MANUAL THERAPY 1/> REGIONS: CPT

## 2024-09-19 PROCEDURE — 97110 THERAPEUTIC EXERCISES: CPT

## 2024-09-20 DIAGNOSIS — S42.202A CLOSED FRACTURE OF PROXIMAL END OF LEFT HUMERUS, UNSPECIFIED FRACTURE MORPHOLOGY, INITIAL ENCOUNTER: ICD-10-CM

## 2024-09-24 ENCOUNTER — HOSPITAL ENCOUNTER (OUTPATIENT)
Dept: PHYSICAL THERAPY | Age: 64
Setting detail: THERAPIES SERIES
Discharge: HOME | End: 2024-09-24
Payer: COMMERCIAL

## 2024-09-24 DIAGNOSIS — S42.202A CLOSED FRACTURE OF PROXIMAL END OF LEFT HUMERUS, UNSPECIFIED FRACTURE MORPHOLOGY, INITIAL ENCOUNTER: Primary | ICD-10-CM

## 2024-09-24 PROCEDURE — 97110 THERAPEUTIC EXERCISES: CPT

## 2024-09-24 PROCEDURE — 97016 VASOPNEUMATIC DEVICE THERAPY: CPT

## 2024-09-24 PROCEDURE — 97140 MANUAL THERAPY 1/> REGIONS: CPT

## 2024-09-24 RX ORDER — CYCLOBENZAPRINE HCL 10 MG
10 TABLET ORAL NIGHTLY PRN
Qty: 10 TABLET | Refills: 0 | Status: SHIPPED | OUTPATIENT
Start: 2024-09-24 | End: 2024-10-04

## 2024-09-26 ENCOUNTER — HOSPITAL ENCOUNTER (OUTPATIENT)
Dept: PHYSICAL THERAPY | Age: 64
Setting detail: THERAPIES SERIES
Discharge: HOME | End: 2024-09-26
Payer: COMMERCIAL

## 2024-09-30 ENCOUNTER — HOSPITAL ENCOUNTER (OUTPATIENT)
Dept: PHYSICAL THERAPY | Age: 64
Setting detail: THERAPIES SERIES
Discharge: HOME | End: 2024-09-30
Payer: COMMERCIAL

## 2024-09-30 PROCEDURE — 97140 MANUAL THERAPY 1/> REGIONS: CPT

## 2024-09-30 RX ORDER — CYCLOBENZAPRINE HCL 10 MG
10 TABLET ORAL 3 TIMES DAILY PRN
Qty: 21 TABLET | Refills: 0 | OUTPATIENT
Start: 2024-09-30
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
## 2024-10-03 ENCOUNTER — OFFICE VISIT (OUTPATIENT)
Dept: ORTHOPEDIC SURGERY | Age: 64
End: 2024-10-03
Payer: COMMERCIAL

## 2024-10-03 VITALS — TEMPERATURE: 97.8 F | HEART RATE: 84 BPM | OXYGEN SATURATION: 96 %

## 2024-10-03 DIAGNOSIS — S42.202A CLOSED FRACTURE OF PROXIMAL END OF LEFT HUMERUS, UNSPECIFIED FRACTURE MORPHOLOGY, INITIAL ENCOUNTER: Primary | ICD-10-CM

## 2024-10-03 PROCEDURE — 99214 OFFICE O/P EST MOD 30 MIN: CPT

## 2024-10-03 ASSESSMENT — ENCOUNTER SYMPTOMS
RHINORRHEA: 0
SHORTNESS OF BREATH: 0
BACK PAIN: 0
FACIAL SWELLING: 0
COUGH: 0
NAUSEA: 0

## 2024-10-03 NOTE — PATIENT INSTRUCTIONS
Central scheduling [phone] will be calling you to schedule your CT scan   If you do not hear from them with in a week give them a call.   Follow up with Dr. Douglas in 2 weeks to discuss the results of the CT scan ordered.

## 2024-10-03 NOTE — PROGRESS NOTES
Patient seen in office today for: _ 4 week f/u-Left shoulder fx and Right proximal fibula fx  Leg - she is able to walk around without the brace on - but on the side of her leg it is sore  knee is fine - shoulder can't lift her arm in PT 8/10 pain she knows something is wrong with it     Patient reports 8/10 pain.  RICE and medication are effective to alleviate pain and reduce swelling.   Pain also alleviated by: OTC meds as needed  Pain worsened by: Patient reports painful ROM & weight bearing.       Xrays performed in office today.   
shoulder exam: Patient left shoulder appears with ecchymosis resolved in the upper arm.  Robust shoulder exam not performed due to fracture status.  Patient able to perform gentle flexion extension of the elbow joint.  Patient able to perform supination pronation of the forearm.  Radial, ulnar, median nerve motor function intact through hand testing radial pulse 2+, brisk capillary fill.  Sensation light touch intact throughout all surfaces of the left upper extremity.    Left knee exam: Patient left knee has mild tenderness to palpation at the lateral knee near the fibular head.  Patient able to perform full knee extension.  No joint laxity with varus or valgus stress.  Anterior and posterior drawer sign negative for laxity.  Popliteal pulse 2+, Homans' sign negative.   Skin:     General: Skin is warm.      Coloration: Skin is not jaundiced or pale.   Neurological:      General: No focal deficit present.      Mental Status: She is alert and oriented to person, place, and time.   Psychiatric:         Mood and Affect: Mood normal.         Behavior: Behavior normal.        Diagnostic testing:  X-ray images were reviewed by myself and discussed with the patient:    Imaging results from today's visit:  In my opinion, images of the proximal fibular shaft appear to show obvious bony callus formation and a well aligned fracture line and a continued state of healing.  As for the proximal humerus bony callus formation is less evident.  There is still comminution at the proximal humerus.  Articular surface of the humeral head appears slightly subluxed in relation to the glenoid.    Office Procedures:  Orders Placed This Encounter   Procedures    CT SHOULDER LEFT WO CONTRAST     Standing Status:   Future     Standing Expiration Date:   10/3/2025       Assessment and Plan  1.  Proximal humerus fracture, left    2.  Proximal fibula fracture, right    -I reassured the patient that her fibula fracture appears well aligned and

## 2024-10-04 ENCOUNTER — HOSPITAL ENCOUNTER (OUTPATIENT)
Dept: CT IMAGING | Age: 64
Discharge: HOME | End: 2024-10-04
Payer: COMMERCIAL

## 2024-10-04 DIAGNOSIS — S42.202A CLOSED FRACTURE OF PROXIMAL END OF LEFT HUMERUS, UNSPECIFIED FRACTURE MORPHOLOGY, INITIAL ENCOUNTER: ICD-10-CM

## 2024-10-04 PROCEDURE — 73200 CT UPPER EXTREMITY W/O DYE: CPT

## 2024-10-10 ENCOUNTER — OFFICE VISIT (OUTPATIENT)
Dept: ORTHOPEDIC SURGERY | Age: 64
End: 2024-10-10
Payer: COMMERCIAL

## 2024-10-10 VITALS
RESPIRATION RATE: 13 BRPM | BODY MASS INDEX: 39.37 KG/M2 | OXYGEN SATURATION: 95 % | WEIGHT: 275 LBS | HEART RATE: 105 BPM | HEIGHT: 70 IN

## 2024-10-10 DIAGNOSIS — Z98.890 H/O REPAIR OF LEFT ROTATOR CUFF: ICD-10-CM

## 2024-10-10 DIAGNOSIS — S42.202A CLOSED FRACTURE OF PROXIMAL END OF LEFT HUMERUS, UNSPECIFIED FRACTURE MORPHOLOGY, INITIAL ENCOUNTER: Primary | ICD-10-CM

## 2024-10-10 DIAGNOSIS — M19.012 OSTEOARTHRITIS, LOCALIZED, SHOULDER, LEFT: ICD-10-CM

## 2024-10-10 PROCEDURE — 99203 OFFICE O/P NEW LOW 30 MIN: CPT | Performed by: ORTHOPAEDIC SURGERY

## 2024-10-10 RX ORDER — ACETAMINOPHEN 500 MG
500 TABLET ORAL EVERY 6 HOURS PRN
COMMUNITY

## 2024-10-10 RX ORDER — FAMOTIDINE 40 MG/1
TABLET, FILM COATED ORAL
COMMUNITY
Start: 2024-10-06

## 2024-10-10 RX ORDER — ATORVASTATIN CALCIUM 40 MG/1
TABLET, FILM COATED ORAL
COMMUNITY
Start: 2024-08-25

## 2024-10-10 RX ORDER — TICAGRELOR 90 MG/1
TABLET ORAL
COMMUNITY

## 2024-10-10 RX ORDER — SEMAGLUTIDE 0.68 MG/ML
INJECTION, SOLUTION SUBCUTANEOUS
COMMUNITY
Start: 2024-09-21

## 2024-10-10 RX ORDER — HYDROCODONE BITARTRATE AND ACETAMINOPHEN 5; 325 MG/1; MG/1
1 TABLET ORAL EVERY 8 HOURS PRN
Qty: 20 TABLET | Refills: 0 | Status: SHIPPED | OUTPATIENT
Start: 2024-10-10 | End: 2024-10-17

## 2024-10-10 ASSESSMENT — ENCOUNTER SYMPTOMS
EYE PAIN: 0
COLOR CHANGE: 0
SHORTNESS OF BREATH: 0
WHEEZING: 0
EYE REDNESS: 0
VOMITING: 0
CHEST TIGHTNESS: 0

## 2024-10-10 NOTE — PATIENT INSTRUCTIONS
We will schedule surgery at soonest convenience. If you have any questions regarding your surgery please call our office and ask to speak with Shannan [phone]
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
## 2024-10-15 ENCOUNTER — TELEPHONE (OUTPATIENT)
Dept: ORTHOPEDIC SURGERY | Age: 64
End: 2024-10-15

## 2024-10-15 ENCOUNTER — PREP FOR PROCEDURE (OUTPATIENT)
Dept: ORTHOPEDIC SURGERY | Age: 64
End: 2024-10-15

## 2024-10-15 DIAGNOSIS — M19.012 PRIMARY OSTEOARTHRITIS OF LEFT SHOULDER: ICD-10-CM

## 2024-10-15 DIAGNOSIS — S42.292A OTHER CLOSED DISPLACED FRACTURE OF PROXIMAL END OF LEFT HUMERUS, INITIAL ENCOUNTER: Primary | ICD-10-CM

## 2024-10-15 DIAGNOSIS — Z01.818 PRE-OP TESTING: Primary | ICD-10-CM

## 2024-10-15 DIAGNOSIS — Z98.890 HX OF REPAIR OF LEFT ROTATOR CUFF: ICD-10-CM

## 2024-10-15 NOTE — TELEPHONE ENCOUNTER
Scheduled patient for:    Left Reverse Total Shoulder Arthroplasty  CPT: 94322  ICD 10: S42.202A; M19.012; Z98.890  Surgery Date: 11/20/2024  Surgeon: Misael  Facility: [facility]  Anesthesia: General/Nerve Block  Product: Edilma PSI    Physical Therapy: [place], order created 10/15/2024  CT of Lt Shoulder for Edilma PSI Protocol, order created 10/15/2024.    Clearances sent to:  PCP: Alysia  Cardiac: Tim    Insurance: Jared  Prior auth started on 10/15/2024 via [employer] Portal, clinicals uploaded  Approved  #: [id number]  11/20/24-1/18/25

## 2024-10-16 RX ORDER — SODIUM CHLORIDE 9 MG/ML
INJECTION, SOLUTION INTRAVENOUS PRN
Status: CANCELLED | OUTPATIENT
Start: 2024-10-16

## 2024-10-16 RX ORDER — SODIUM CHLORIDE 0.9 % (FLUSH) 0.9 %
5-40 SYRINGE (ML) INJECTION PRN
Status: CANCELLED | OUTPATIENT
Start: 2024-10-16

## 2024-10-16 RX ORDER — SODIUM CHLORIDE 0.9 % (FLUSH) 0.9 %
5-40 SYRINGE (ML) INJECTION EVERY 12 HOURS SCHEDULED
Status: CANCELLED | OUTPATIENT
Start: 2024-10-16

## 2024-10-16 RX ORDER — ACETAMINOPHEN 325 MG/1
1000 TABLET ORAL ONCE
Status: CANCELLED | OUTPATIENT
Start: 2024-10-16 | End: 2024-10-16

## 2024-10-29 ENCOUNTER — TELEPHONE (OUTPATIENT)
Dept: ORTHOPEDIC SURGERY | Age: 64
End: 2024-10-29

## 2024-10-29 DIAGNOSIS — S42.202A CLOSED FRACTURE OF PROXIMAL END OF LEFT HUMERUS, UNSPECIFIED FRACTURE MORPHOLOGY, INITIAL ENCOUNTER: Primary | ICD-10-CM

## 2024-10-29 RX ORDER — HYDROCODONE BITARTRATE AND ACETAMINOPHEN 5; 325 MG/1; MG/1
1 TABLET ORAL 2 TIMES DAILY PRN
Qty: 14 TABLET | Refills: 0 | Status: SHIPPED | OUTPATIENT
Start: 2024-10-29 | End: 2024-11-05

## 2024-10-29 NOTE — TELEPHONE ENCOUNTER
Patient is supposed to have surgery on 11/20/2024 for a left shoulder RTSA. Patient stated that she is currently out of pain medication is requesting more medication sent to pharmacy.

## 2024-10-31 NOTE — PROGRESS NOTES
10/31/24 - .Reminded of pre-testing on 11/6/24 between [id number].   Bring insurance card and picture ID. Check in at the information desk in the lobby upon your arrival. You can eat and take morning medications. Please call and verify you received this message.  11/5/24 - Update: patient is aware of testing and will arrive between [id number].

## 2024-11-02 ASSESSMENT — PROMIS GLOBAL HEALTH SCALE
TO WHAT EXTENT ARE YOU ABLE TO CARRY OUT YOUR EVERYDAY PHYSICAL ACTIVITIES SUCH AS WALKING, CLIMBING STAIRS, CARRYING GROCERIES, OR MOVING A CHAIR [ON A SCALE OF 1 (NOT AT ALL) TO 5 (COMPLETELY)]?: A LITTLE
IN GENERAL, HOW WOULD YOU RATE YOUR MENTAL HEALTH, INCLUDING YOUR MOOD AND YOUR ABILITY TO THINK [ON A SCALE OF 1 (POOR) TO 5 (EXCELLENT)]?: GOOD
IN THE PAST 7 DAYS, HOW OFTEN HAVE YOU BEEN BOTHERED BY EMOTIONAL PROBLEMS, SUCH AS FEELING ANXIOUS, DEPRESSED, OR IRRITABLE [ON A SCALE FROM 1 (NEVER) TO 5 (ALWAYS)]?: RARELY
IN THE PAST 7 DAYS, HOW WOULD YOU RATE YOUR FATIGUE ON AVERAGE [ON A SCALE FROM 1 (NONE) TO 5 (VERY SEVERE)]?: SEVERE
IN THE PAST 7 DAYS, HOW WOULD YOU RATE YOUR FATIGUE ON AVERAGE [ON A SCALE FROM 1 (NONE) TO 5 (VERY SEVERE)]?: SEVERE
IN GENERAL, HOW WOULD YOU RATE YOUR PHYSICAL HEALTH [ON A SCALE OF 1 (POOR) TO 5 (EXCELLENT)]?: FAIR
IN GENERAL, PLEASE RATE HOW WELL YOU CARRY OUT YOUR USUAL SOCIAL ACTIVITIES (INCLUDES ACTIVITIES AT HOME, AT WORK, AND IN YOUR COMMUNITY, AND RESPONSIBILITIES AS A PARENT, CHILD, SPOUSE, EMPLOYEE, FRIEND, ETC) [ON A SCALE OF 1 (POOR) TO 5 (EXCELLENT)]?: GOOD
IN GENERAL, WOULD YOU SAY YOUR QUALITY OF LIFE IS...[ON A SCALE OF 1 (POOR) TO 5 (EXCELLENT)]: GOOD
IN THE PAST 7 DAYS, HOW WOULD YOU RATE YOUR PAIN ON AVERAGE [ON A SCALE FROM 0 (NO PAIN) TO 10 (WORST IMAGINABLE PAIN)]?: 6
IN GENERAL, HOW WOULD YOU RATE YOUR SATISFACTION WITH YOUR SOCIAL ACTIVITIES AND RELATIONSHIPS [ON A SCALE OF 1 (POOR) TO 5 (EXCELLENT)]?: GOOD
SUM OF RESPONSES TO QUESTIONS 3, 6, 7, & 8: 12
IN GENERAL, PLEASE RATE HOW WELL YOU CARRY OUT YOUR USUAL SOCIAL ACTIVITIES (INCLUDES ACTIVITIES AT HOME, AT WORK, AND IN YOUR COMMUNITY, AND RESPONSIBILITIES AS A PARENT, CHILD, SPOUSE, EMPLOYEE, FRIEND, ETC) [ON A SCALE OF 1 (POOR) TO 5 (EXCELLENT)]?: GOOD
WHO IS THE PERSON COMPLETING THE PROMIS V1.1 SURVEY?: SELF
HOW IS THE PROMIS V1.1 BEING ADMINISTERED?: ELECTRONIC
IN THE PAST 7 DAYS, HOW WOULD YOU RATE YOUR PAIN ON AVERAGE [ON A SCALE FROM 0 (NO PAIN) TO 10 (WORST IMAGINABLE PAIN)]?: 6
IN GENERAL, WOULD YOU SAY YOUR HEALTH IS...[ON A SCALE OF 1 (POOR) TO 5 (EXCELLENT)]: FAIR
TO WHAT EXTENT ARE YOU ABLE TO CARRY OUT YOUR EVERYDAY PHYSICAL ACTIVITIES SUCH AS WALKING, CLIMBING STAIRS, CARRYING GROCERIES, OR MOVING A CHAIR [ON A SCALE OF 1 (NOT AT ALL) TO 5 (COMPLETELY)]?: A LITTLE
IN GENERAL, HOW WOULD YOU RATE YOUR SATISFACTION WITH YOUR SOCIAL ACTIVITIES AND RELATIONSHIPS [ON A SCALE OF 1 (POOR) TO 5 (EXCELLENT)]?: GOOD
WHO IS THE PERSON COMPLETING THE PROMIS V1.1 SURVEY?: SELF
HOW IS THE PROMIS V1.1 BEING ADMINISTERED?: ELECTRONIC
IN THE PAST 7 DAYS, HOW OFTEN HAVE YOU BEEN BOTHERED BY EMOTIONAL PROBLEMS, SUCH AS FEELING ANXIOUS, DEPRESSED, OR IRRITABLE [ON A SCALE FROM 1 (NEVER) TO 5 (ALWAYS)]?: RARELY
SUM OF RESPONSES TO QUESTIONS 2, 4, 5, & 10: 13

## 2024-11-04 ENCOUNTER — NURSE ONLY (OUTPATIENT)
Dept: ORTHOPEDIC SURGERY | Age: 64
End: 2024-11-04

## 2024-11-04 NOTE — PROGRESS NOTES
MARLEY, Left TSA, DOS 11/20/2024    Patient would like to discharge from [facility] with orders to outpatient PT.     All questions and concerns of the patient's have been answered at this time to the best of my ability. Patient is aware they may contact this nurse at her direct number given to them at anytime with questions or concerns moving forward.     CHG bathing soap given to patient with instructions.     Present in appt with patient today is: , Jimenez

## 2024-11-06 ENCOUNTER — HOSPITAL ENCOUNTER (OUTPATIENT)
Dept: PREADMISSION TESTING | Age: 64
Discharge: HOME | End: 2024-11-10
Payer: COMMERCIAL

## 2024-11-06 ENCOUNTER — HOSPITAL ENCOUNTER (OUTPATIENT)
Dept: GENERAL RADIOLOGY | Age: 64
Discharge: HOME | End: 2024-11-06
Payer: COMMERCIAL

## 2024-11-06 ENCOUNTER — HOSPITAL ENCOUNTER (OUTPATIENT)
Age: 64
Discharge: HOME | End: 2024-11-08
Payer: COMMERCIAL

## 2024-11-06 ENCOUNTER — HOSPITAL ENCOUNTER (OUTPATIENT)
Age: 64
Discharge: HOME | End: 2024-11-06
Payer: COMMERCIAL

## 2024-11-06 DIAGNOSIS — Z01.818 PRE-OP TESTING: ICD-10-CM

## 2024-11-06 LAB
ALBUMIN SERPL-MCNC: 3.8 G/DL (ref 3.4–5)
ALBUMIN/GLOB SERPL: 1.1 {RATIO} (ref 1.1–2.2)
ALP SERPL-CCNC: 131 U/L (ref 40–129)
ALT SERPL-CCNC: 14 U/L (ref 10–40)
ANION GAP SERPL CALCULATED.3IONS-SCNC: 13 MMOL/L (ref 9–17)
AST SERPL-CCNC: 20 U/L (ref 15–37)
BASOPHILS # BLD: 0.06 K/UL
BASOPHILS NFR BLD: 1 % (ref 0–1)
BILIRUB SERPL-MCNC: <0.2 MG/DL (ref 0–1)
BILIRUB UR QL STRIP: NEGATIVE
BUN SERPL-MCNC: 13 MG/DL (ref 7–20)
CALCIUM SERPL-MCNC: 9.4 MG/DL (ref 8.3–10.6)
CHLORIDE SERPL-SCNC: 102 MMOL/L (ref 99–110)
CLARITY UR: CLEAR
CO2 SERPL-SCNC: 25 MMOL/L (ref 21–32)
COLOR UR: YELLOW
COMMENT: ABNORMAL
CREAT SERPL-MCNC: 0.5 MG/DL (ref 0.6–1.2)
EKG ATRIAL RATE: 90 BPM
EKG DIAGNOSIS: NORMAL
EKG P AXIS: 70 DEGREES
EKG P-R INTERVAL: 146 MS
EKG Q-T INTERVAL: 396 MS
EKG QRS DURATION: 120 MS
EKG QTC CALCULATION (BAZETT): 484 MS
EKG R AXIS: 7 DEGREES
EKG T AXIS: 12 DEGREES
EKG VENTRICULAR RATE: 90 BPM
EOSINOPHIL # BLD: 0.22 K/UL
EOSINOPHILS RELATIVE PERCENT: 3 % (ref 0–3)
ERYTHROCYTE [DISTWIDTH] IN BLOOD BY AUTOMATED COUNT: 13.6 % (ref 11.7–14.9)
GFR, ESTIMATED: >90 ML/MIN/1.73M2
GLUCOSE SERPL-MCNC: 92 MG/DL (ref 74–99)
GLUCOSE UR STRIP-MCNC: >=1000 MG/DL
HCT VFR BLD AUTO: 44.2 % (ref 37–47)
HGB BLD-MCNC: 14.1 G/DL (ref 12.5–16)
HGB UR QL STRIP.AUTO: NEGATIVE
IMM GRANULOCYTES # BLD AUTO: 0.02 K/UL
IMM GRANULOCYTES NFR BLD: 0 %
KETONES UR STRIP-MCNC: NEGATIVE MG/DL
LEUKOCYTE ESTERASE UR QL STRIP: NEGATIVE
LYMPHOCYTES NFR BLD: 3.16 K/UL
LYMPHOCYTES RELATIVE PERCENT: 42 % (ref 24–44)
MCH RBC QN AUTO: 28.6 PG (ref 27–31)
MCHC RBC AUTO-ENTMCNC: 31.9 G/DL (ref 32–36)
MCV RBC AUTO: 89.7 FL (ref 78–100)
MONOCYTES NFR BLD: 0.53 K/UL
MONOCYTES NFR BLD: 7 % (ref 0–4)
NEUTROPHILS NFR BLD: 47 % (ref 36–66)
NEUTS SEG NFR BLD: 3.51 K/UL
NITRITE UR QL STRIP: NEGATIVE
PH UR STRIP: 5.5 [PH] (ref 5–8)
PLATELET # BLD AUTO: 334 K/UL (ref 140–440)
PMV BLD AUTO: 9.3 FL (ref 7.5–11.1)
POTASSIUM SERPL-SCNC: 4.1 MMOL/L (ref 3.5–5.1)
PROT SERPL-MCNC: 7.2 G/DL (ref 6.4–8.2)
PROT UR STRIP-MCNC: NEGATIVE MG/DL
RBC # BLD AUTO: 4.93 M/UL (ref 4.2–5.4)
SODIUM SERPL-SCNC: 140 MMOL/L (ref 136–145)
SP GR UR STRIP: 1.02 (ref 1–1.03)
UROBILINOGEN UR STRIP-ACNC: 0.2 EU/DL (ref 0–1)
WBC OTHER # BLD: 7.5 K/UL (ref 4–10.5)

## 2024-11-06 PROCEDURE — 71046 X-RAY EXAM CHEST 2 VIEWS: CPT

## 2024-11-06 PROCEDURE — 80053 COMPREHEN METABOLIC PANEL: CPT

## 2024-11-06 PROCEDURE — 85025 COMPLETE CBC W/AUTO DIFF WBC: CPT

## 2024-11-06 PROCEDURE — 81003 URINALYSIS AUTO W/O SCOPE: CPT

## 2024-11-06 PROCEDURE — 93005 ELECTROCARDIOGRAM TRACING: CPT

## 2024-11-06 PROCEDURE — 87086 URINE CULTURE/COLONY COUNT: CPT

## 2024-11-06 PROCEDURE — 36415 COLL VENOUS BLD VENIPUNCTURE: CPT

## 2024-11-07 LAB
MICROORGANISM SPEC CULT: NORMAL
SERVICE CMNT-IMP: NORMAL
SPECIMEN DESCRIPTION: NORMAL

## 2024-11-14 NOTE — PROGRESS NOTES
Patient will be called with an arrival time on 11/19/2024 for her procedure at [facility] on 11/20/2024.    NOTHING TO EAT OR DRINK AFTER MIDNIGHT DAY OF SURGERY    1. Enter thru the hospital main entrance on day of surgery, check in at the Information Desk. If you arrive prior to 6:00am, enter thru the ER entrance.    2. Follow the directions as prescribed by the doctor for your procedure and medications.         Morning of surgery take:coreg, gabapentin, bupar, lexapro, .pepcid and protonix         Stop vitamins, supplements and NSAIDS:  brillinta last dose 11/14/2024 in the AM. Aspirin last dose 11/13/2024 , patient said she does not take plavix.    3. Check with your Doctor regarding stopping blood thinners and follow their instructions.    4. Do not smoke, vape or use chewing tobacco morning of surgery. Do not drink any alcoholic beverages 24 hours prior to surgery.       This includes NA Beer. No street drugs 7 days prior to surgery.    5. If you have dentures, contacts of glasses they will be removed before going to the OR; please bring a case.    6. Please bring picture ID, insurance card, paperwork from the doctor’s office (H & P, Consent, & card for implantable devices).    7. Take a shower with an antibacterial soap the night before surgery and the morning of surgery. Do not put anything on your skin      After your morning shower.    8. You will need a responsible adult to drive you home and check on you after surgery.

## 2024-11-19 ENCOUNTER — ANESTHESIA EVENT (OUTPATIENT)
Dept: OPERATING ROOM | Age: 64
End: 2024-11-19
Payer: COMMERCIAL

## 2024-11-19 NOTE — PROGRESS NOTES
Notified patient surgery @ [facility] on  11/20/24 @ 0600, arrival 0800. NPO status and morning medications reviewed. Understanding verbalized.

## 2024-11-20 ENCOUNTER — APPOINTMENT (OUTPATIENT)
Dept: GENERAL RADIOLOGY | Age: 64
End: 2024-11-20
Attending: ORTHOPAEDIC SURGERY
Payer: COMMERCIAL

## 2024-11-20 ENCOUNTER — ANESTHESIA (OUTPATIENT)
Dept: OPERATING ROOM | Age: 64
End: 2024-11-20
Payer: COMMERCIAL

## 2024-11-20 ENCOUNTER — HOSPITAL ENCOUNTER (OUTPATIENT)
Age: 64
Setting detail: OBSERVATION
Discharge: HOME HEALTH | End: 2024-11-21
Attending: ORTHOPAEDIC SURGERY | Admitting: ORTHOPAEDIC SURGERY
Payer: COMMERCIAL

## 2024-11-20 DIAGNOSIS — Z96.612 STATUS POST REVERSE TOTAL SHOULDER REPLACEMENT, LEFT: Primary | ICD-10-CM

## 2024-11-20 PROBLEM — S42.202A: Status: ACTIVE | Noted: 2024-11-20

## 2024-11-20 LAB
GLUCOSE BLD-MCNC: 138 MG/DL (ref 74–99)
GLUCOSE BLD-MCNC: 240 MG/DL (ref 74–99)
GLUCOSE BLD-MCNC: 242 MG/DL (ref 74–99)
GLUCOSE BLD-MCNC: 280 MG/DL (ref 74–99)

## 2024-11-20 PROCEDURE — 2700000000 HC OXYGEN THERAPY PER DAY

## 2024-11-20 PROCEDURE — 3600000005 HC SURGERY LEVEL 5 BASE: Performed by: ORTHOPAEDIC SURGERY

## 2024-11-20 PROCEDURE — 2580000003 HC RX 258: Performed by: ORTHOPAEDIC SURGERY

## 2024-11-20 PROCEDURE — 6370000000 HC RX 637 (ALT 250 FOR IP): Performed by: ANESTHESIOLOGY

## 2024-11-20 PROCEDURE — 3600000015 HC SURGERY LEVEL 5 ADDTL 15MIN: Performed by: ORTHOPAEDIC SURGERY

## 2024-11-20 PROCEDURE — 2580000003 HC RX 258

## 2024-11-20 PROCEDURE — P9045 ALBUMIN (HUMAN), 5%, 250 ML: HCPCS

## 2024-11-20 PROCEDURE — C1713 ANCHOR/SCREW BN/BN,TIS/BN: HCPCS | Performed by: ORTHOPAEDIC SURGERY

## 2024-11-20 PROCEDURE — 3700000000 HC ANESTHESIA ATTENDED CARE: Performed by: ORTHOPAEDIC SURGERY

## 2024-11-20 PROCEDURE — 6370000000 HC RX 637 (ALT 250 FOR IP): Performed by: ORTHOPAEDIC SURGERY

## 2024-11-20 PROCEDURE — 6360000002 HC RX W HCPCS: Performed by: ANESTHESIOLOGY

## 2024-11-20 PROCEDURE — 2500000003 HC RX 250 WO HCPCS

## 2024-11-20 PROCEDURE — 3700000001 HC ADD 15 MINUTES (ANESTHESIA): Performed by: ORTHOPAEDIC SURGERY

## 2024-11-20 PROCEDURE — 2720000010 HC SURG SUPPLY STERILE: Performed by: ORTHOPAEDIC SURGERY

## 2024-11-20 PROCEDURE — 6360000002 HC RX W HCPCS: Performed by: ORTHOPAEDIC SURGERY

## 2024-11-20 PROCEDURE — 2500000003 HC RX 250 WO HCPCS: Performed by: ORTHOPAEDIC SURGERY

## 2024-11-20 PROCEDURE — C1776 JOINT DEVICE (IMPLANTABLE): HCPCS | Performed by: ORTHOPAEDIC SURGERY

## 2024-11-20 PROCEDURE — 7100000000 HC PACU RECOVERY - FIRST 15 MIN: Performed by: ORTHOPAEDIC SURGERY

## 2024-11-20 PROCEDURE — 64415 NJX AA&/STRD BRCH PLXS IMG: CPT | Performed by: ANESTHESIOLOGY

## 2024-11-20 PROCEDURE — 73030 X-RAY EXAM OF SHOULDER: CPT

## 2024-11-20 PROCEDURE — 7100000001 HC PACU RECOVERY - ADDTL 15 MIN: Performed by: ORTHOPAEDIC SURGERY

## 2024-11-20 PROCEDURE — L3650 SO 8 ABD RESTRAINT PRE OTS: HCPCS | Performed by: ORTHOPAEDIC SURGERY

## 2024-11-20 PROCEDURE — 71045 X-RAY EXAM CHEST 1 VIEW: CPT

## 2024-11-20 PROCEDURE — 94761 N-INVAS EAR/PLS OXIMETRY MLT: CPT

## 2024-11-20 PROCEDURE — 6360000002 HC RX W HCPCS

## 2024-11-20 PROCEDURE — 82947 ASSAY GLUCOSE BLOOD QUANT: CPT

## 2024-11-20 PROCEDURE — 2709999900 HC NON-CHARGEABLE SUPPLY: Performed by: ORTHOPAEDIC SURGERY

## 2024-11-20 PROCEDURE — 2780000010 HC IMPLANT OTHER: Performed by: ORTHOPAEDIC SURGERY

## 2024-11-20 PROCEDURE — G0378 HOSPITAL OBSERVATION PER HR: HCPCS

## 2024-11-20 RX ORDER — INSULIN GLARGINE 100 [IU]/ML
100 INJECTION, SOLUTION SUBCUTANEOUS DAILY
Status: DISCONTINUED | OUTPATIENT
Start: 2024-11-21 | End: 2024-11-21 | Stop reason: HOSPADM

## 2024-11-20 RX ORDER — GLUCAGON 1 MG/ML
1 KIT INJECTION PRN
Status: DISCONTINUED | OUTPATIENT
Start: 2024-11-20 | End: 2024-11-21 | Stop reason: HOSPADM

## 2024-11-20 RX ORDER — GABAPENTIN 300 MG/1
600 CAPSULE ORAL DAILY
Status: DISCONTINUED | OUTPATIENT
Start: 2024-11-20 | End: 2024-11-21 | Stop reason: HOSPADM

## 2024-11-20 RX ORDER — ASPIRIN 81 MG/1
81 TABLET, CHEWABLE ORAL DAILY
Status: DISCONTINUED | OUTPATIENT
Start: 2024-11-20 | End: 2024-11-20 | Stop reason: SDUPTHER

## 2024-11-20 RX ORDER — ONDANSETRON 2 MG/ML
INJECTION INTRAMUSCULAR; INTRAVENOUS
Status: DISCONTINUED | OUTPATIENT
Start: 2024-11-20 | End: 2024-11-20 | Stop reason: SDUPTHER

## 2024-11-20 RX ORDER — INSULIN LISPRO 100 [IU]/ML
0-8 INJECTION, SOLUTION INTRAVENOUS; SUBCUTANEOUS
Status: DISCONTINUED | OUTPATIENT
Start: 2024-11-20 | End: 2024-11-21 | Stop reason: HOSPADM

## 2024-11-20 RX ORDER — FENTANYL CITRATE 50 UG/ML
25 INJECTION, SOLUTION INTRAMUSCULAR; INTRAVENOUS EVERY 5 MIN PRN
Status: DISCONTINUED | OUTPATIENT
Start: 2024-11-20 | End: 2024-11-20 | Stop reason: HOSPADM

## 2024-11-20 RX ORDER — PANTOPRAZOLE SODIUM 40 MG/1
40 TABLET, DELAYED RELEASE ORAL
Status: DISCONTINUED | OUTPATIENT
Start: 2024-11-21 | End: 2024-11-21 | Stop reason: HOSPADM

## 2024-11-20 RX ORDER — DEXAMETHASONE SODIUM PHOSPHATE 4 MG/ML
INJECTION, SOLUTION INTRA-ARTICULAR; INTRALESIONAL; INTRAMUSCULAR; INTRAVENOUS; SOFT TISSUE
Status: DISCONTINUED | OUTPATIENT
Start: 2024-11-20 | End: 2024-11-20 | Stop reason: SDUPTHER

## 2024-11-20 RX ORDER — LIDOCAINE HYDROCHLORIDE 20 MG/ML
INJECTION, SOLUTION EPIDURAL; INFILTRATION; INTRACAUDAL; PERINEURAL
Status: DISCONTINUED | OUTPATIENT
Start: 2024-11-20 | End: 2024-11-20 | Stop reason: SDUPTHER

## 2024-11-20 RX ORDER — HYDROMORPHONE HYDROCHLORIDE 1 MG/ML
0.5 INJECTION, SOLUTION INTRAMUSCULAR; INTRAVENOUS; SUBCUTANEOUS
Status: DISCONTINUED | OUTPATIENT
Start: 2024-11-20 | End: 2024-11-21 | Stop reason: HOSPADM

## 2024-11-20 RX ORDER — FAMOTIDINE 20 MG/1
40 TABLET, FILM COATED ORAL DAILY
Status: DISCONTINUED | OUTPATIENT
Start: 2024-11-21 | End: 2024-11-21 | Stop reason: HOSPADM

## 2024-11-20 RX ORDER — CALCIUM CHLORIDE 100 MG/ML
INJECTION INTRAVENOUS; INTRAVENTRICULAR
Status: DISCONTINUED | OUTPATIENT
Start: 2024-11-20 | End: 2024-11-20 | Stop reason: SDUPTHER

## 2024-11-20 RX ORDER — ONDANSETRON 2 MG/ML
4 INJECTION INTRAMUSCULAR; INTRAVENOUS EVERY 6 HOURS PRN
Status: DISCONTINUED | OUTPATIENT
Start: 2024-11-20 | End: 2024-11-21 | Stop reason: HOSPADM

## 2024-11-20 RX ORDER — DEXTROSE MONOHYDRATE 100 MG/ML
INJECTION, SOLUTION INTRAVENOUS CONTINUOUS PRN
Status: DISCONTINUED | OUTPATIENT
Start: 2024-11-20 | End: 2024-11-21 | Stop reason: HOSPADM

## 2024-11-20 RX ORDER — PROPOFOL 10 MG/ML
INJECTION, EMULSION INTRAVENOUS
Status: DISCONTINUED | OUTPATIENT
Start: 2024-11-20 | End: 2024-11-20 | Stop reason: SDUPTHER

## 2024-11-20 RX ORDER — EZETIMIBE 10 MG/1
10 TABLET ORAL NIGHTLY
Status: DISCONTINUED | OUTPATIENT
Start: 2024-11-20 | End: 2024-11-21 | Stop reason: HOSPADM

## 2024-11-20 RX ORDER — VASOPRESSIN 20 U/ML
INJECTION PARENTERAL
Status: DISCONTINUED | OUTPATIENT
Start: 2024-11-20 | End: 2024-11-20 | Stop reason: SDUPTHER

## 2024-11-20 RX ORDER — HYDROMORPHONE HYDROCHLORIDE 1 MG/ML
0.25 INJECTION, SOLUTION INTRAMUSCULAR; INTRAVENOUS; SUBCUTANEOUS
Status: DISCONTINUED | OUTPATIENT
Start: 2024-11-20 | End: 2024-11-21 | Stop reason: HOSPADM

## 2024-11-20 RX ORDER — ONDANSETRON 2 MG/ML
4 INJECTION INTRAMUSCULAR; INTRAVENOUS
Status: DISCONTINUED | OUTPATIENT
Start: 2024-11-20 | End: 2024-11-20 | Stop reason: HOSPADM

## 2024-11-20 RX ORDER — SODIUM CHLORIDE 0.9 % (FLUSH) 0.9 %
5-40 SYRINGE (ML) INJECTION PRN
Status: DISCONTINUED | OUTPATIENT
Start: 2024-11-20 | End: 2024-11-20 | Stop reason: HOSPADM

## 2024-11-20 RX ORDER — METFORMIN HYDROCHLORIDE 500 MG/1
500 TABLET, EXTENDED RELEASE ORAL NIGHTLY
Status: DISCONTINUED | OUTPATIENT
Start: 2024-11-20 | End: 2024-11-21 | Stop reason: HOSPADM

## 2024-11-20 RX ORDER — EPHEDRINE SULFATE 50 MG/ML
INJECTION INTRAVENOUS
Status: DISCONTINUED | OUTPATIENT
Start: 2024-11-20 | End: 2024-11-20 | Stop reason: SDUPTHER

## 2024-11-20 RX ORDER — ONDANSETRON 4 MG/1
4 TABLET, ORALLY DISINTEGRATING ORAL EVERY 8 HOURS PRN
Status: DISCONTINUED | OUTPATIENT
Start: 2024-11-20 | End: 2024-11-21 | Stop reason: HOSPADM

## 2024-11-20 RX ORDER — ROCURONIUM BROMIDE 10 MG/ML
INJECTION, SOLUTION INTRAVENOUS
Status: DISCONTINUED | OUTPATIENT
Start: 2024-11-20 | End: 2024-11-20 | Stop reason: SDUPTHER

## 2024-11-20 RX ORDER — MIDAZOLAM HYDROCHLORIDE 1 MG/ML
INJECTION, SOLUTION INTRAMUSCULAR; INTRAVENOUS
Status: DISCONTINUED | OUTPATIENT
Start: 2024-11-20 | End: 2024-11-20 | Stop reason: SDUPTHER

## 2024-11-20 RX ORDER — TRANEXAMIC ACID 10 MG/ML
1000 INJECTION, SOLUTION INTRAVENOUS
Status: COMPLETED | OUTPATIENT
Start: 2024-11-20 | End: 2024-11-20

## 2024-11-20 RX ORDER — KETOROLAC TROMETHAMINE 30 MG/ML
30 INJECTION, SOLUTION INTRAMUSCULAR; INTRAVENOUS EVERY 6 HOURS PRN
Status: DISCONTINUED | OUTPATIENT
Start: 2024-11-20 | End: 2024-11-21 | Stop reason: HOSPADM

## 2024-11-20 RX ORDER — ALBUMIN HUMAN 50 G/1000ML
SOLUTION INTRAVENOUS
Status: DISCONTINUED | OUTPATIENT
Start: 2024-11-20 | End: 2024-11-20 | Stop reason: SDUPTHER

## 2024-11-20 RX ORDER — ESCITALOPRAM OXALATE 10 MG/1
20 TABLET ORAL DAILY
Status: DISCONTINUED | OUTPATIENT
Start: 2024-11-21 | End: 2024-11-21 | Stop reason: HOSPADM

## 2024-11-20 RX ORDER — SODIUM CHLORIDE 9 MG/ML
INJECTION, SOLUTION INTRAVENOUS PRN
Status: DISCONTINUED | OUTPATIENT
Start: 2024-11-20 | End: 2024-11-20 | Stop reason: HOSPADM

## 2024-11-20 RX ORDER — GLYCOPYRROLATE 0.2 MG/ML
INJECTION INTRAMUSCULAR; INTRAVENOUS
Status: DISCONTINUED | OUTPATIENT
Start: 2024-11-20 | End: 2024-11-20 | Stop reason: SDUPTHER

## 2024-11-20 RX ORDER — PROCHLORPERAZINE EDISYLATE 5 MG/ML
5 INJECTION INTRAMUSCULAR; INTRAVENOUS
Status: DISCONTINUED | OUTPATIENT
Start: 2024-11-20 | End: 2024-11-20 | Stop reason: HOSPADM

## 2024-11-20 RX ORDER — SODIUM CHLORIDE 0.9 % (FLUSH) 0.9 %
5-40 SYRINGE (ML) INJECTION EVERY 12 HOURS SCHEDULED
Status: DISCONTINUED | OUTPATIENT
Start: 2024-11-20 | End: 2024-11-21 | Stop reason: HOSPADM

## 2024-11-20 RX ORDER — ACETAMINOPHEN 325 MG/1
650 TABLET ORAL EVERY 4 HOURS PRN
Status: DISCONTINUED | OUTPATIENT
Start: 2024-11-20 | End: 2024-11-21 | Stop reason: HOSPADM

## 2024-11-20 RX ORDER — SODIUM CHLORIDE 0.9 % (FLUSH) 0.9 %
5-40 SYRINGE (ML) INJECTION EVERY 12 HOURS SCHEDULED
Status: DISCONTINUED | OUTPATIENT
Start: 2024-11-20 | End: 2024-11-20 | Stop reason: HOSPADM

## 2024-11-20 RX ORDER — BUTALBITAL, ACETAMINOPHEN AND CAFFEINE 50; 325; 40 MG/1; MG/1; MG/1
1 TABLET ORAL EVERY 4 HOURS PRN
Status: DISCONTINUED | OUTPATIENT
Start: 2024-11-20 | End: 2024-11-21 | Stop reason: HOSPADM

## 2024-11-20 RX ORDER — HYDRALAZINE HYDROCHLORIDE 20 MG/ML
10 INJECTION INTRAMUSCULAR; INTRAVENOUS
Status: DISCONTINUED | OUTPATIENT
Start: 2024-11-20 | End: 2024-11-20 | Stop reason: HOSPADM

## 2024-11-20 RX ORDER — BUTALBITAL, ACETAMINOPHEN AND CAFFEINE 50; 325; 40 MG/1; MG/1; MG/1
1 TABLET ORAL ONCE
Status: COMPLETED | OUTPATIENT
Start: 2024-11-20 | End: 2024-11-20

## 2024-11-20 RX ORDER — OXYCODONE AND ACETAMINOPHEN 5; 325 MG/1; MG/1
1 TABLET ORAL EVERY 4 HOURS PRN
Status: DISCONTINUED | OUTPATIENT
Start: 2024-11-20 | End: 2024-11-21 | Stop reason: HOSPADM

## 2024-11-20 RX ORDER — OXYCODONE AND ACETAMINOPHEN 5; 325 MG/1; MG/1
2 TABLET ORAL EVERY 4 HOURS PRN
Status: DISCONTINUED | OUTPATIENT
Start: 2024-11-20 | End: 2024-11-21 | Stop reason: HOSPADM

## 2024-11-20 RX ORDER — ROPIVACAINE HYDROCHLORIDE 5 MG/ML
INJECTION, SOLUTION EPIDURAL; INFILTRATION; PERINEURAL
Status: COMPLETED | OUTPATIENT
Start: 2024-11-20 | End: 2024-11-20

## 2024-11-20 RX ORDER — BUSPIRONE HYDROCHLORIDE 15 MG/1
15 TABLET ORAL ONCE
Status: DISCONTINUED | OUTPATIENT
Start: 2024-11-20 | End: 2024-11-20 | Stop reason: SDUPTHER

## 2024-11-20 RX ORDER — BUSPIRONE HYDROCHLORIDE 15 MG/1
15 TABLET ORAL 3 TIMES DAILY
Status: DISCONTINUED | OUTPATIENT
Start: 2024-11-20 | End: 2024-11-21 | Stop reason: HOSPADM

## 2024-11-20 RX ORDER — ROSUVASTATIN CALCIUM 5 MG/1
10 TABLET, COATED ORAL NIGHTLY
Status: DISCONTINUED | OUTPATIENT
Start: 2024-11-20 | End: 2024-11-21 | Stop reason: HOSPADM

## 2024-11-20 RX ORDER — SODIUM CHLORIDE 9 MG/ML
INJECTION, SOLUTION INTRAVENOUS PRN
Status: DISCONTINUED | OUTPATIENT
Start: 2024-11-20 | End: 2024-11-21 | Stop reason: HOSPADM

## 2024-11-20 RX ORDER — SODIUM CHLORIDE 0.9 % (FLUSH) 0.9 %
5-40 SYRINGE (ML) INJECTION PRN
Status: DISCONTINUED | OUTPATIENT
Start: 2024-11-20 | End: 2024-11-21 | Stop reason: HOSPADM

## 2024-11-20 RX ORDER — PIOGLITAZONE 15 MG/1
15 TABLET ORAL DAILY
Status: DISCONTINUED | OUTPATIENT
Start: 2024-11-21 | End: 2024-11-21 | Stop reason: HOSPADM

## 2024-11-20 RX ORDER — ASPIRIN 325 MG
325 TABLET ORAL 2 TIMES DAILY
Status: DISCONTINUED | OUTPATIENT
Start: 2024-11-21 | End: 2024-11-21 | Stop reason: HOSPADM

## 2024-11-20 RX ORDER — CARVEDILOL 6.25 MG/1
6.25 TABLET ORAL 2 TIMES DAILY WITH MEALS
Status: DISCONTINUED | OUTPATIENT
Start: 2024-11-20 | End: 2024-11-21 | Stop reason: HOSPADM

## 2024-11-20 RX ORDER — NALOXONE HYDROCHLORIDE 0.4 MG/ML
INJECTION, SOLUTION INTRAMUSCULAR; INTRAVENOUS; SUBCUTANEOUS PRN
Status: DISCONTINUED | OUTPATIENT
Start: 2024-11-20 | End: 2024-11-20 | Stop reason: HOSPADM

## 2024-11-20 RX ORDER — ACETAMINOPHEN 500 MG
1000 TABLET ORAL ONCE
Status: COMPLETED | OUTPATIENT
Start: 2024-11-20 | End: 2024-11-20

## 2024-11-20 RX ORDER — SODIUM CHLORIDE 9 MG/ML
INJECTION, SOLUTION INTRAVENOUS CONTINUOUS
Status: DISPENSED | OUTPATIENT
Start: 2024-11-20 | End: 2024-11-21

## 2024-11-20 RX ORDER — ACETAMINOPHEN 325 MG/1
650 TABLET ORAL
Status: DISCONTINUED | OUTPATIENT
Start: 2024-11-20 | End: 2024-11-20 | Stop reason: HOSPADM

## 2024-11-20 RX ADMIN — VASOPRESSIN 3 UNITS: 20 INJECTION INTRAVENOUS at 09:08

## 2024-11-20 RX ADMIN — NOREPINEPHRINE BITARTRATE 32 MCG: 1 INJECTION, SOLUTION, CONCENTRATE INTRAVENOUS at 09:12

## 2024-11-20 RX ADMIN — BUSPIRONE HYDROCHLORIDE 15 MG: 15 TABLET ORAL at 21:16

## 2024-11-20 RX ADMIN — ASPIRIN 81 MG: 81 TABLET, CHEWABLE ORAL at 07:27

## 2024-11-20 RX ADMIN — TRANEXAMIC ACID 1000 MG: 10 INJECTION, SOLUTION INTRAVENOUS at 08:24

## 2024-11-20 RX ADMIN — GABAPENTIN 600 MG: 300 CAPSULE ORAL at 15:28

## 2024-11-20 RX ADMIN — INSULIN LISPRO 4 UNITS: 100 INJECTION, SOLUTION INTRAVENOUS; SUBCUTANEOUS at 17:53

## 2024-11-20 RX ADMIN — PHENYLEPHRINE HYDROCHLORIDE 200 MCG: 10 INJECTION INTRAVENOUS at 08:29

## 2024-11-20 RX ADMIN — BUSPIRONE HYDROCHLORIDE 15 MG: 15 TABLET ORAL at 15:28

## 2024-11-20 RX ADMIN — INSULIN LISPRO 2 UNITS: 100 INJECTION, SOLUTION INTRAVENOUS; SUBCUTANEOUS at 21:16

## 2024-11-20 RX ADMIN — NOREPINEPHRINE BITARTRATE 32 MCG: 1 INJECTION, SOLUTION, CONCENTRATE INTRAVENOUS at 09:47

## 2024-11-20 RX ADMIN — VASOPRESSIN 2 UNITS: 20 INJECTION INTRAVENOUS at 09:45

## 2024-11-20 RX ADMIN — METFORMIN HYDROCHLORIDE 500 MG: 500 TABLET, EXTENDED RELEASE ORAL at 21:16

## 2024-11-20 RX ADMIN — PHENYLEPHRINE HYDROCHLORIDE 200 MCG: 10 INJECTION INTRAVENOUS at 08:11

## 2024-11-20 RX ADMIN — VASOPRESSIN 2 UNITS: 20 INJECTION INTRAVENOUS at 09:39

## 2024-11-20 RX ADMIN — NOREPINEPHRINE BITARTRATE 32 MCG: 1 INJECTION, SOLUTION, CONCENTRATE INTRAVENOUS at 09:56

## 2024-11-20 RX ADMIN — ALBUMIN (HUMAN) 12.5 G: 12.5 INJECTION, SOLUTION INTRAVENOUS at 09:02

## 2024-11-20 RX ADMIN — SODIUM CHLORIDE 3000 MG: 900 INJECTION INTRAVENOUS at 18:31

## 2024-11-20 RX ADMIN — NOREPINEPHRINE BITARTRATE 32 MCG: 1 INJECTION, SOLUTION, CONCENTRATE INTRAVENOUS at 09:26

## 2024-11-20 RX ADMIN — SODIUM CHLORIDE 3000 MG: 900 INJECTION INTRAVENOUS at 08:22

## 2024-11-20 RX ADMIN — LIDOCAINE HYDROCHLORIDE 100 MG: 20 INJECTION, SOLUTION EPIDURAL; INFILTRATION; INTRACAUDAL; PERINEURAL at 08:11

## 2024-11-20 RX ADMIN — PHENYLEPHRINE HYDROCHLORIDE 200 MCG: 10 INJECTION INTRAVENOUS at 08:20

## 2024-11-20 RX ADMIN — OXYCODONE HYDROCHLORIDE AND ACETAMINOPHEN 1 TABLET: 5; 325 TABLET ORAL at 17:52

## 2024-11-20 RX ADMIN — CALCIUM CHLORIDE 1 G: 100 INJECTION, SOLUTION INTRAVENOUS; INTRAVENTRICULAR at 09:34

## 2024-11-20 RX ADMIN — VASOPRESSIN 2 UNITS: 20 INJECTION INTRAVENOUS at 08:44

## 2024-11-20 RX ADMIN — VASOPRESSIN 1 UNITS: 20 INJECTION INTRAVENOUS at 08:56

## 2024-11-20 RX ADMIN — ROCURONIUM BROMIDE 10 MG: 50 INJECTION INTRAVENOUS at 09:53

## 2024-11-20 RX ADMIN — DEXAMETHASONE SODIUM PHOSPHATE 8 MG: 4 INJECTION, SOLUTION INTRAMUSCULAR; INTRAVENOUS at 08:22

## 2024-11-20 RX ADMIN — EPHEDRINE SULFATE 10 MG: 50 INJECTION INTRAVENOUS at 08:44

## 2024-11-20 RX ADMIN — ROCURONIUM BROMIDE 50 MG: 50 INJECTION INTRAVENOUS at 08:11

## 2024-11-20 RX ADMIN — VASOPRESSIN 1 UNITS: 20 INJECTION INTRAVENOUS at 09:05

## 2024-11-20 RX ADMIN — VASOPRESSIN 2 UNITS: 20 INJECTION INTRAVENOUS at 09:29

## 2024-11-20 RX ADMIN — BUTALBITAL, ACETAMINOPHEN, AND CAFFEINE 1 TABLET: 325; 50; 40 TABLET ORAL at 07:31

## 2024-11-20 RX ADMIN — VASOPRESSIN 1 UNITS: 20 INJECTION INTRAVENOUS at 08:39

## 2024-11-20 RX ADMIN — CARVEDILOL 6.25 MG: 6.25 TABLET, FILM COATED ORAL at 17:52

## 2024-11-20 RX ADMIN — NOREPINEPHRINE BITARTRATE 32 MCG: 1 INJECTION, SOLUTION, CONCENTRATE INTRAVENOUS at 09:20

## 2024-11-20 RX ADMIN — EZETIMIBE 10 MG: 10 TABLET ORAL at 21:16

## 2024-11-20 RX ADMIN — ROSUVASTATIN CALCIUM 10 MG: 5 TABLET, FILM COATED ORAL at 21:16

## 2024-11-20 RX ADMIN — ROCURONIUM BROMIDE 20 MG: 50 INJECTION INTRAVENOUS at 09:06

## 2024-11-20 RX ADMIN — MELATONIN TAB 3 MG 3 MG: 3 TAB at 21:16

## 2024-11-20 RX ADMIN — MIDAZOLAM 1 MG: 1 INJECTION INTRAMUSCULAR; INTRAVENOUS at 07:41

## 2024-11-20 RX ADMIN — NOREPINEPHRINE BITARTRATE 32 MCG: 1 INJECTION, SOLUTION, CONCENTRATE INTRAVENOUS at 09:50

## 2024-11-20 RX ADMIN — VASOPRESSIN 2 UNITS: 20 INJECTION INTRAVENOUS at 09:23

## 2024-11-20 RX ADMIN — PROPOFOL 150 MG: 10 INJECTION, EMULSION INTRAVENOUS at 08:11

## 2024-11-20 RX ADMIN — HYDROMORPHONE HYDROCHLORIDE 0.25 MG: 1 INJECTION, SOLUTION INTRAMUSCULAR; INTRAVENOUS; SUBCUTANEOUS at 20:52

## 2024-11-20 RX ADMIN — EPHEDRINE SULFATE 15 MG: 50 INJECTION INTRAVENOUS at 08:47

## 2024-11-20 RX ADMIN — ROPIVACAINE HYDROCHLORIDE 20 ML: 5 INJECTION, SOLUTION EPIDURAL; INFILTRATION; PERINEURAL at 07:48

## 2024-11-20 RX ADMIN — GLYCOPYRROLATE 0.2 MG: 0.2 INJECTION INTRAMUSCULAR; INTRAVENOUS at 08:48

## 2024-11-20 RX ADMIN — SODIUM CHLORIDE: 9 INJECTION, SOLUTION INTRAVENOUS at 15:16

## 2024-11-20 RX ADMIN — VASOPRESSIN 2 UNITS: 20 INJECTION INTRAVENOUS at 08:47

## 2024-11-20 RX ADMIN — SUGAMMADEX 200 MG: 100 INJECTION, SOLUTION INTRAVENOUS at 10:15

## 2024-11-20 RX ADMIN — EPHEDRINE SULFATE 25 MG: 50 INJECTION INTRAVENOUS at 08:29

## 2024-11-20 RX ADMIN — PHENYLEPHRINE HYDROCHLORIDE 40 MCG/MIN: 10 INJECTION INTRAVENOUS at 08:24

## 2024-11-20 RX ADMIN — SODIUM CHLORIDE: 9 INJECTION, SOLUTION INTRAVENOUS at 07:05

## 2024-11-20 RX ADMIN — VASOPRESSIN 2 UNITS: 20 INJECTION INTRAVENOUS at 09:02

## 2024-11-20 RX ADMIN — PHENYLEPHRINE HYDROCHLORIDE 300 MCG: 10 INJECTION INTRAVENOUS at 09:59

## 2024-11-20 RX ADMIN — OXYCODONE HYDROCHLORIDE AND ACETAMINOPHEN 2 TABLET: 5; 325 TABLET ORAL at 22:50

## 2024-11-20 RX ADMIN — MIDAZOLAM 1 MG: 1 INJECTION INTRAMUSCULAR; INTRAVENOUS at 07:43

## 2024-11-20 RX ADMIN — ACETAMINOPHEN 500 MG: 500 TABLET ORAL at 07:28

## 2024-11-20 RX ADMIN — NOREPINEPHRINE BITARTRATE 32 MCG: 1 INJECTION, SOLUTION, CONCENTRATE INTRAVENOUS at 09:37

## 2024-11-20 RX ADMIN — EMPAGLIFLOZIN 10 MG: 10 TABLET, FILM COATED ORAL at 15:28

## 2024-11-20 RX ADMIN — NOREPINEPHRINE BITARTRATE 32 MCG: 1 INJECTION, SOLUTION, CONCENTRATE INTRAVENOUS at 09:39

## 2024-11-20 RX ADMIN — ONDANSETRON 4 MG: 2 INJECTION INTRAMUSCULAR; INTRAVENOUS at 09:51

## 2024-11-20 RX ADMIN — NOREPINEPHRINE BITARTRATE 5 MCG/MIN: 1 INJECTION, SOLUTION, CONCENTRATE INTRAVENOUS at 09:11

## 2024-11-20 RX ADMIN — ONDANSETRON 4 MG: 2 INJECTION INTRAMUSCULAR; INTRAVENOUS at 08:22

## 2024-11-20 ASSESSMENT — PAIN DESCRIPTION - FREQUENCY
FREQUENCY: CONTINUOUS
FREQUENCY: CONTINUOUS

## 2024-11-20 ASSESSMENT — PAIN DESCRIPTION - DESCRIPTORS
DESCRIPTORS: ACHING
DESCRIPTORS: ACHING
DESCRIPTORS_2: SHARP
DESCRIPTORS: ACHING
DESCRIPTORS: ACHING

## 2024-11-20 ASSESSMENT — PAIN SCALES - GENERAL
PAINLEVEL_OUTOF10: 6
PAINLEVEL_OUTOF10: 3
PAINLEVEL_OUTOF10: 5
PAINLEVEL_OUTOF10: 5
PAINLEVEL_OUTOF10: 4
PAINLEVEL_OUTOF10: 4
PAINLEVEL_OUTOF10: 5
PAINLEVEL_OUTOF10: 6
PAINLEVEL_OUTOF10: 3
PAINLEVEL_OUTOF10: 3

## 2024-11-20 ASSESSMENT — PAIN DESCRIPTION - ORIENTATION
ORIENTATION_2: LEFT
ORIENTATION: MID

## 2024-11-20 ASSESSMENT — PAIN - FUNCTIONAL ASSESSMENT
PAIN_FUNCTIONAL_ASSESSMENT: ACTIVITIES ARE NOT PREVENTED
PAIN_FUNCTIONAL_ASSESSMENT: PREVENTS OR INTERFERES SOME ACTIVE ACTIVITIES AND ADLS
PAIN_FUNCTIONAL_ASSESSMENT_SITE2: PREVENTS OR INTERFERES SOME ACTIVE ACTIVITIES AND ADLS
PAIN_FUNCTIONAL_ASSESSMENT: ACTIVITIES ARE NOT PREVENTED
PAIN_FUNCTIONAL_ASSESSMENT: PREVENTS OR INTERFERES SOME ACTIVE ACTIVITIES AND ADLS

## 2024-11-20 ASSESSMENT — PAIN DESCRIPTION - LOCATION
LOCATION: SHOULDER
LOCATION: HEAD
LOCATION: SHOULDER
LOCATION_2: SHOULDER
LOCATION: SHOULDER

## 2024-11-20 ASSESSMENT — PAIN DESCRIPTION - PAIN TYPE
TYPE: ACUTE PAIN
TYPE: SURGICAL PAIN

## 2024-11-20 ASSESSMENT — PAIN DESCRIPTION - ONSET
ONSET: GRADUAL
ONSET: GRADUAL

## 2024-11-20 ASSESSMENT — PAIN DESCRIPTION - INTENSITY: RATING_2: 9

## 2024-11-20 NOTE — ANESTHESIA PRE PROCEDURE
Department of Anesthesiology  Preprocedure Note       Name:  Ariana Panchal   Age:  64 y.o.  :  1960                                          MRN:  [id number]         Date:  2024      Surgeon: Surgeon(s):  Jimenez Douglas MD    Procedure: Procedure(s):  LEFT SHOULDER TOTAL ARTHROPLASTY REVERSE    Medications prior to admission:   Prior to Admission medications    Medication Sig Start Date End Date Taking? Authorizing Provider   BRILINTA 90 MG TABS tablet TAKE 1 TABLET BY MOUTH IN THE MORNING AND 1 IN THE EVENING    Pramod Rhoades MD   OZEMPIC, 0.25 OR 0.5 MG/DOSE, 2 MG/3ML SOPN INJECT 0.25MG SUBCUTANEOUSLY ONCE WEEKLY FOR 4 WEEKS, THEN INJECT 0.5MG ONCE WEEKLY 24   Pramod Rhoades MD   famotidine (PEPCID) 40 MG tablet  10/6/24   Pramod Rhoades MD   atorvastatin (LIPITOR) 40 MG tablet  24   Pramod Rhoades MD   acetaminophen (TYLENOL) 500 MG tablet Take 1 tablet by mouth every 6 hours as needed    Pramod Rhoades MD   naproxen (NAPROSYN) 500 MG tablet Take 1 tablet by mouth 2 times daily 8/10/24   Justin Ni DO   losartan (COZAAR) 25 MG tablet Take 1 tablet by mouth daily 22   Meir Mcdermott MD   ezetimibe (ZETIA) 10 MG tablet Take 1 tablet by mouth nightly 22   Meir Mcdermott MD   carvedilol (COREG) 6.25 MG tablet Take 1 tablet by mouth 2 times daily (with meals) 22   Meir Mcdermott MD   miconazole (MICOTIN) 2 % powder Apply topically 2 times daily. 22   Meir Mcdermott MD   clopidogrel (PLAVIX) 75 MG tablet Take 1 tablet by mouth daily 22   Meir Mcdermott MD   pantoprazole (PROTONIX) 40 MG tablet Take 1 tablet by mouth every morning (before breakfast) 22   Meir Mcdermott MD   terbinafine (LAMISIL) 250 MG tablet  22   Pramod Rhoades MD   metoprolol tartrate (LOPRESSOR) 25 MG tablet  22  Pramod Rhoades MD   busPIRone (BUSPAR) 15 MG tablet Take 15 mg by mouth 3 times daily 22   Afsaneh Dickinson, 
mention of complication, not stated as uncontrolled        Past Surgical History:        Procedure Laterality Date   • BREAST SURGERY Left     lumpectomy, right breast biopsy. lymph nodes removed   • CARDIAC CATHETERIZATION      no stents.   •  SECTION     • CHOLECYSTECTOMY     • COLONOSCOPY     • COLONOSCOPY N/A 3/29/2022    COLONOSCOPY POLYPECTOMY SNARE/COLD BIOPSY performed by Daljit Ansari MD at [facility] OR   • ELBOW SURGERY Right     biceps   • ENDOSCOPY, COLON, DIAGNOSTIC     • INSERTABLE CARDIAC MONITOR     • KNEE ARTHROSCOPY Bilateral    • PORT SURGERY      port placed and removed.   • ROTATOR CUFF REPAIR Right    • UPPER GASTROINTESTINAL ENDOSCOPY N/A 3/29/2022    EGD BIOPSY performed by Daljit Ansari MD at [facility] OR       Social History:    Social History     Tobacco Use   • Smoking status: Never   • Smokeless tobacco: Never   Substance Use Topics   • Alcohol use: No                                Counseling given: Not Answered      Vital Signs (Current):   Vitals:    24 1448 24 1449 24 0630   BP:   131/76   Pulse:   94   Resp:   18   Temp:   98 °F (36.7 °C)   TempSrc:   Temporal   SpO2:   95%   Weight: 124.7 kg (275 lb) 122 kg (269 lb)    Height: 1.778 m (5' 10\")                                                BP Readings from Last 3 Encounters:   24 131/76   08/10/24 (!) 149/81   22 (!) 121/51       NPO Status:                                                                                 BMI:   Wt Readings from Last 3 Encounters:   24 122 kg (269 lb)   10/10/24 124.7 kg (275 lb)   08/10/24 124.7 kg (275 lb)     Body mass index is 38.6 kg/m².    CBC:   Lab Results   Component Value Date/Time    WBC 7.5 2024 09:30 AM    RBC 4.93 2024 09:30 AM    HGB 14.1 2024 09:30 AM    HCT 44.2 2024 09:30 AM    MCV 89.7 2024 09:30 AM    RDW 13.6 2024 09:30 AM     2024 09:30 AM       CMP:   Lab Results   Component Value 
needs device and assist

## 2024-11-20 NOTE — ANESTHESIA POSTPROCEDURE EVALUATION
Department of Anesthesiology  Postprocedure Note    Patient: Ariana Panchal  MRN: [id number]  YOB: 1960  Date of evaluation: 11/20/2024    Procedure Summary       Date: 11/20/24 Room / Location: [address]    Anesthesia Start: 0800 Anesthesia Stop: 1035    Procedure: LEFT SHOULDER TOTAL ARTHROPLASTY REVERSE (Left: Shoulder) Diagnosis:       Closed fracture of left proximal humerus      Primary osteoarthritis of left shoulder      (Closed fracture of left proximal humerus [S42.202A])      (Primary osteoarthritis of left shoulder [M19.012])    Surgeons: Jimenez Douglas MD Responsible Provider: Lissa Andrew MD    Anesthesia Type: General, Regional ASA Status: 4            Anesthesia Type: General, Regional    Caden Phase I: Caden Score: 10    Caden Phase II:      Anesthesia Post Evaluation    Patient location during evaluation: PACU  Patient participation: complete - patient participated  Level of consciousness: sleepy but conscious  Airway patency: patent  Nausea & Vomiting: no nausea and no vomiting  Cardiovascular status: hemodynamically stable  Respiratory status: acceptable, face mask and spontaneous ventilation  Hydration status: stable  Pain management: adequate    No notable events documented.

## 2024-11-20 NOTE — PROGRESS NOTES
1027 Pt arrived to PACU.  Monitors applied and alarms set.  Report received.  Pt awake and alert.  BP low (see flow sheet)  Dr Andrew and Paulino LAGUERRE at bedside.   1045 Pt awake and conversing. Pleasant.  Denies pain.  BP remains low.    1050 Pt states having difficulty catching her breath. Sats 98 on 2 lnc. Dr. Andrew notified and arrived to bedside. CXR ordered.  1115 BP getting better. Pt drinking coffee and conversing.  Voices wanting to get up in a chair.  1135 Dr. Andrew at bedside.  Reviewed CXR.  [facility]

## 2024-11-20 NOTE — ANESTHESIA PROCEDURE NOTES
Peripheral Block    Patient location during procedure: pre-op  Reason for block: post-op pain management  Start time: 11/20/2024 7:48 AM  End time: 11/20/2024 7:48 AM  Staffing  Performed: anesthesiologist   Anesthesiologist: Lissa Andrew MD  Resident/CRNA: Swapnil Quintero APRN - CRNA  Performed by: Lissa Andrew MD  Authorized by: Lissa Andrew MD    Preanesthetic Checklist  Completed: patient identified, IV checked, site marked, risks and benefits discussed, surgical/procedural consents, equipment checked, pre-op evaluation, timeout performed, anesthesia consent given, oxygen available, monitors applied/VS acknowledged, fire risk safety assessment completed and verbalized and blood product R/B/A discussed and consented  Peripheral Block   Patient position: sitting  Prep: ChloraPrep  Provider prep: mask and sterile gloves  Patient monitoring: cardiac monitor, continuous pulse ox, frequent blood pressure checks, IV access and responsive to questions  Block type: Brachial plexus  Interscalene  Laterality: left  Injection technique: single-shot  Guidance: ultrasound guided    Needle   Needle type: pencil-tip   Needle gauge: 20 G  Needle localization: ultrasound guidance  Needle length: 5 cm  Assessment   Injection assessment: negative aspiration for heme, no paresthesia on injection and local visualized surrounding nerve on ultrasound  Slow fractionated injection: yes  Outcomes: uncomplicated    Medications Administered  ropivacaine (NAROPIN) injection 0.5% - Perineural   20 mL - 11/20/2024 7:48:00 AM

## 2024-11-20 NOTE — PROGRESS NOTES
4 Eyes Skin Assessment     NAME:  Ariana Panchal  YOB: 1960  MEDICAL RECORD NUMBER:  [id number]    The patient is being assessed for  Admission    I agree that at least one RN has performed a thorough Head to Toe Skin Assessment on the patient. ALL assessment sites listed below have been assessed.      Areas assessed by both nurses:    Head, Face, Ears, Shoulders, Back, Chest, Arms, Elbows, Hands, Sacrum. Buttock, Coccyx, Ischium, Legs. Feet and Heels, and Under Medical Devices         Does the Patient have a Wound? No noted wound(s)       Toney Prevention initiated by RN: No  Wound Care Orders initiated by RN: No    Pressure Injury (Stage 3,4, Unstageable, DTI, NWPT, and Complex wounds) if present, place Wound referral order by RN under : No    New Ostomies, if present place, Ostomy referral order under : No     Nurse 1 eSignature: Electronically signed by Emerald Monteiro RN on 11/20/24 at 1:19 PM EST    **SHARE this note so that the co-signing nurse can place an eSignature**    Nurse 2 eSignature: Electronically signed by Stacey Neely LPN on 11/20/24 at 1:37 PM EST

## 2024-11-20 NOTE — OP NOTE
Operative Note      Patient: Ariana Panchal  YOB: 1960  MRN: [id number]    Date of Procedure: 11/20/2024    Pre-Op Diagnosis Codes:      * Closed fracture of left proximal humerus [S42.202A]     * Primary osteoarthritis of left shoulder [M19.012]    Post-Op Diagnosis: Same       Procedure(s):  LEFT SHOULDER TOTAL ARTHROPLASTY REVERSE    Surgeon(s):  Jimenez Douglas MD    Assistant:   Physician Assistant: Jaron Reid PA-C Greg Mann was present for the entirety of the procedure and vital for the performance of the procedure. Danie Reid assisted with positioning, prepping, draping of the patient before the procedure and instrument manipulation, extremity repositioning during the procedure as well as wound closure, dressing application and brace application after the procedure. Please note that no intern, resident, or other hospital staff was available to assist during the surgery    Anesthesia: General, regional nerve block    Estimated Blood Loss (mL): 200     Complications: None    Specimens:   * No specimens in log *    Implants:  Implant Name Type Inv. Item Serial No.  Lot No. LRB No. Used Action   BASEPLATE BERNIE [id number] MINI SHLDR REV TAPR COMPHSVE - [id number]  BASEPLATE BERNIE [id number] MINI SHLDR REV TAPR COMPHSVE  [employer] [facility] [id number] Left 1 Implanted   SCREW BNE L25MM DIA6.5MM HEX HD DIA3.5MM FOR COMPHSVE CONV - [id number]  SCREW BNE L25MM DIA6.5MM HEX HD DIA3.5MM FOR COMPHSVE CONV  [employer] [facility] [id number] Left 1 Implanted   SCREW BNE L15MM DIA4.75MM HD DIA3.5MM MAME FIX ANG - [id number]  SCREW BNE L15MM DIA4.75MM HD DIA3.5MM MAME FIX ANG  [employer] [id number] Left 1 Implanted   SCREW BNE L35MM DIA4.75MM HD DIA3.5MM MAME FIX ANG - [id number]  SCREW BNE L35MM DIA4.75MM HD DIA3.5MM MAME FIX ANG  [employer] [facility] [id number] Left 1 Implanted   SCREW BNE L15MM DIA4.75MM HD DIA3.5MM MAME FIX ANG - [id number]  SCREW BNE L15MM

## 2024-11-20 NOTE — H&P
severe impaction and displacement through the surgical neck with evidence of comminution and disruption of the articular surface of the glenohumeral joint.  Additionally she has underlying arthritic changes in the shoulder as described from her previous shoulder arthroscopies and rotator cuff repair.  She has chronic rotator cuff pathology which was recently repaired but she has noticed severe progression of her disease process related to her recent injury.     We discussed treatment options in detail.  Conservative measures have failed and the patient is not interested in any injections at this time.  The patient is having severe symptoms that are affecting her quality of life and impacting her ability to function even with simple activities of daily living.     I have recommended that we proceed with reconstructive surgery to the right shoulder.  This will consist of a reverse total shoulder replacement.  We discussed the goals of surgery including improved range of motion and function and strength and pain relief.     We discussed the need for physical therapy and rehabilitation following surgery in order to rebuild the function and mobility of the shoulder.     We discussed pertinent risks including wound healing problems, infection, bleeding, blood clots, stiffness, fracture, dislocation, and other surgical issues.  She understands and would like to proceed with surgery.  She is familiar with joint replacement and has functioned well following her previous knee replacement.     All of the questions were answered and the patient would like to proceed with a right reverse total shoulder replacement.      History and Physical exam note from the office visit is copied above from epic.     I have reviewed the note and examined the patient and find no relevant changes.     I have reviewed with the patient and/or family the risks, benefits, and alternatives to the procedure as well as expected postoperative

## 2024-11-21 VITALS
DIASTOLIC BLOOD PRESSURE: 58 MMHG | HEIGHT: 70 IN | SYSTOLIC BLOOD PRESSURE: 125 MMHG | WEIGHT: 275.57 LBS | BODY MASS INDEX: 39.45 KG/M2 | TEMPERATURE: 98.3 F | OXYGEN SATURATION: 92 % | HEART RATE: 83 BPM | RESPIRATION RATE: 18 BRPM

## 2024-11-21 PROBLEM — Z96.612 STATUS POST REVERSE TOTAL SHOULDER REPLACEMENT, LEFT: Status: ACTIVE | Noted: 2024-11-21

## 2024-11-21 LAB
BASOPHILS # BLD: 0.05 K/UL
BASOPHILS NFR BLD: 0 % (ref 0–1)
EOSINOPHIL # BLD: 0.01 K/UL
EOSINOPHILS RELATIVE PERCENT: 0 % (ref 0–3)
ERYTHROCYTE [DISTWIDTH] IN BLOOD BY AUTOMATED COUNT: 14.3 % (ref 11.7–14.9)
GLUCOSE BLD-MCNC: 196 MG/DL (ref 74–99)
GLUCOSE BLD-MCNC: 221 MG/DL (ref 74–99)
HCT VFR BLD AUTO: 36.6 % (ref 37–47)
HGB BLD-MCNC: 11.6 G/DL (ref 12.5–16)
IMM GRANULOCYTES # BLD AUTO: 0.04 K/UL
IMM GRANULOCYTES NFR BLD: 0 %
LYMPHOCYTES NFR BLD: 3.17 K/UL
LYMPHOCYTES RELATIVE PERCENT: 24 % (ref 24–44)
MCH RBC QN AUTO: 28.2 PG (ref 27–31)
MCHC RBC AUTO-ENTMCNC: 31.7 G/DL (ref 32–36)
MCV RBC AUTO: 89.1 FL (ref 78–100)
MONOCYTES NFR BLD: 1.27 K/UL
MONOCYTES NFR BLD: 10 % (ref 0–4)
NEUTROPHILS NFR BLD: 66 % (ref 36–66)
NEUTS SEG NFR BLD: 8.82 K/UL
PLATELET # BLD AUTO: 292 K/UL (ref 140–440)
PMV BLD AUTO: 9.9 FL (ref 7.5–11.1)
RBC # BLD AUTO: 4.11 M/UL (ref 4.2–5.4)
WBC OTHER # BLD: 13.4 K/UL (ref 4–10.5)

## 2024-11-21 PROCEDURE — 6360000002 HC RX W HCPCS: Performed by: ORTHOPAEDIC SURGERY

## 2024-11-21 PROCEDURE — 82947 ASSAY GLUCOSE BLOOD QUANT: CPT

## 2024-11-21 PROCEDURE — 6370000000 HC RX 637 (ALT 250 FOR IP): Performed by: ORTHOPAEDIC SURGERY

## 2024-11-21 PROCEDURE — 85025 COMPLETE CBC W/AUTO DIFF WBC: CPT

## 2024-11-21 PROCEDURE — G0378 HOSPITAL OBSERVATION PER HR: HCPCS

## 2024-11-21 PROCEDURE — 36415 COLL VENOUS BLD VENIPUNCTURE: CPT

## 2024-11-21 PROCEDURE — 97162 PT EVAL MOD COMPLEX 30 MIN: CPT

## 2024-11-21 PROCEDURE — 97116 GAIT TRAINING THERAPY: CPT

## 2024-11-21 PROCEDURE — 94761 N-INVAS EAR/PLS OXIMETRY MLT: CPT

## 2024-11-21 PROCEDURE — 96375 TX/PRO/DX INJ NEW DRUG ADDON: CPT

## 2024-11-21 PROCEDURE — 94150 VITAL CAPACITY TEST: CPT

## 2024-11-21 PROCEDURE — 96376 TX/PRO/DX INJ SAME DRUG ADON: CPT

## 2024-11-21 PROCEDURE — 96374 THER/PROPH/DIAG INJ IV PUSH: CPT

## 2024-11-21 PROCEDURE — 2580000003 HC RX 258: Performed by: ORTHOPAEDIC SURGERY

## 2024-11-21 RX ORDER — OXYCODONE AND ACETAMINOPHEN 5; 325 MG/1; MG/1
2 TABLET ORAL EVERY 6 HOURS PRN
Qty: 40 TABLET | Refills: 0 | Status: SHIPPED | OUTPATIENT
Start: 2024-11-21 | End: 2024-11-28

## 2024-11-21 RX ORDER — DOCUSATE SODIUM 100 MG/1
100 CAPSULE, LIQUID FILLED ORAL DAILY PRN
Qty: 30 CAPSULE | Refills: 0 | Status: SHIPPED | OUTPATIENT
Start: 2024-11-21

## 2024-11-21 RX ADMIN — BUSPIRONE HYDROCHLORIDE 15 MG: 15 TABLET ORAL at 08:16

## 2024-11-21 RX ADMIN — INSULIN GLARGINE 100 UNITS: 100 INJECTION, SOLUTION SUBCUTANEOUS at 08:32

## 2024-11-21 RX ADMIN — OXYCODONE HYDROCHLORIDE AND ACETAMINOPHEN 1 TABLET: 5; 325 TABLET ORAL at 14:22

## 2024-11-21 RX ADMIN — OXYCODONE HYDROCHLORIDE AND ACETAMINOPHEN 2 TABLET: 5; 325 TABLET ORAL at 04:02

## 2024-11-21 RX ADMIN — GABAPENTIN 600 MG: 300 CAPSULE ORAL at 08:15

## 2024-11-21 RX ADMIN — CARVEDILOL 6.25 MG: 6.25 TABLET, FILM COATED ORAL at 08:16

## 2024-11-21 RX ADMIN — PIOGLITAZONE 15 MG: 15 TABLET ORAL at 08:31

## 2024-11-21 RX ADMIN — INSULIN LISPRO 2 UNITS: 100 INJECTION, SOLUTION INTRAVENOUS; SUBCUTANEOUS at 06:56

## 2024-11-21 RX ADMIN — OXYCODONE HYDROCHLORIDE AND ACETAMINOPHEN 2 TABLET: 5; 325 TABLET ORAL at 10:08

## 2024-11-21 RX ADMIN — KETOROLAC TROMETHAMINE 30 MG: 30 INJECTION, SOLUTION INTRAMUSCULAR; INTRAVENOUS at 06:04

## 2024-11-21 RX ADMIN — HYDROMORPHONE HYDROCHLORIDE 0.5 MG: 1 INJECTION, SOLUTION INTRAMUSCULAR; INTRAVENOUS; SUBCUTANEOUS at 00:30

## 2024-11-21 RX ADMIN — BUSPIRONE HYDROCHLORIDE 15 MG: 15 TABLET ORAL at 12:47

## 2024-11-21 RX ADMIN — SODIUM CHLORIDE 3000 MG: 900 INJECTION INTRAVENOUS at 00:35

## 2024-11-21 RX ADMIN — INSULIN LISPRO 2 UNITS: 100 INJECTION, SOLUTION INTRAVENOUS; SUBCUTANEOUS at 12:48

## 2024-11-21 RX ADMIN — ESCITALOPRAM OXALATE 20 MG: 10 TABLET ORAL at 08:16

## 2024-11-21 RX ADMIN — HYDROMORPHONE HYDROCHLORIDE 0.5 MG: 1 INJECTION, SOLUTION INTRAMUSCULAR; INTRAVENOUS; SUBCUTANEOUS at 08:16

## 2024-11-21 RX ADMIN — HYDROMORPHONE HYDROCHLORIDE 0.5 MG: 1 INJECTION, SOLUTION INTRAMUSCULAR; INTRAVENOUS; SUBCUTANEOUS at 05:04

## 2024-11-21 RX ADMIN — SODIUM CHLORIDE, PRESERVATIVE FREE 10 ML: 5 INJECTION INTRAVENOUS at 08:18

## 2024-11-21 RX ADMIN — ASPIRIN 325 MG: 325 TABLET ORAL at 08:16

## 2024-11-21 RX ADMIN — SODIUM CHLORIDE 3000 MG: 900 INJECTION INTRAVENOUS at 08:38

## 2024-11-21 RX ADMIN — PANTOPRAZOLE SODIUM 40 MG: 40 TABLET, DELAYED RELEASE ORAL at 06:05

## 2024-11-21 RX ADMIN — TICAGRELOR 90 MG: 90 TABLET ORAL at 08:31

## 2024-11-21 RX ADMIN — FAMOTIDINE 40 MG: 20 TABLET ORAL at 08:16

## 2024-11-21 RX ADMIN — EMPAGLIFLOZIN 10 MG: 10 TABLET, FILM COATED ORAL at 08:16

## 2024-11-21 ASSESSMENT — PAIN SCALES - GENERAL
PAINLEVEL_OUTOF10: 5
PAINLEVEL_OUTOF10: 5
PAINLEVEL_OUTOF10: 4
PAINLEVEL_OUTOF10: 7
PAINLEVEL_OUTOF10: 0
PAINLEVEL_OUTOF10: 9
PAINLEVEL_OUTOF10: 0
PAINLEVEL_OUTOF10: 7
PAINLEVEL_OUTOF10: 9
PAINLEVEL_OUTOF10: 4
PAINLEVEL_OUTOF10: 10
PAINLEVEL_OUTOF10: 7
PAINLEVEL_OUTOF10: 7
PAINLEVEL_OUTOF10: 0
PAINLEVEL_OUTOF10: 4
PAINLEVEL_OUTOF10: 9

## 2024-11-21 ASSESSMENT — PAIN DESCRIPTION - LOCATION
LOCATION: SHOULDER
LOCATION: ARM
LOCATION: ARM
LOCATION: SHOULDER

## 2024-11-21 ASSESSMENT — PAIN - FUNCTIONAL ASSESSMENT
PAIN_FUNCTIONAL_ASSESSMENT: ACTIVITIES ARE NOT PREVENTED
PAIN_FUNCTIONAL_ASSESSMENT: ACTIVITIES ARE NOT PREVENTED
PAIN_FUNCTIONAL_ASSESSMENT: PREVENTS OR INTERFERES SOME ACTIVE ACTIVITIES AND ADLS
PAIN_FUNCTIONAL_ASSESSMENT: ACTIVITIES ARE NOT PREVENTED
PAIN_FUNCTIONAL_ASSESSMENT: PREVENTS OR INTERFERES SOME ACTIVE ACTIVITIES AND ADLS
PAIN_FUNCTIONAL_ASSESSMENT: ACTIVITIES ARE NOT PREVENTED

## 2024-11-21 ASSESSMENT — PAIN DESCRIPTION - DESCRIPTORS
DESCRIPTORS: ACHING

## 2024-11-21 ASSESSMENT — PAIN DESCRIPTION - ORIENTATION
ORIENTATION: LEFT

## 2024-11-21 NOTE — DISCHARGE INSTRUCTIONS
You may shower.  Change bandage after shower to try gauze dressing over the shoulder.  Incision site may be left open to air after 5 days if there is no evidence of drainage.  There is a clear tape and glue over the incision site which should remain in place for 2 weeks.    Use your sling when moving about and sleeping.  You may loosen it it at rest for comfort while sitting.  You may also remove the sling for bathing and gentle movement as pain allows.  Do not elevate the arm over 90 degrees or rotate the arm away from the body.    Remove the sling for brief periods as needed for comfort and to perform gentle range of motion activities.  You may practice using circular motions of the shoulder with the arm hanging by your side.  Practice doing gentle range of motion of the elbow, wrist and hand to prevent stiffness.  Okay to gently assist and elevate the arm parallel to the ground as pain allows.    Call the office if you have any concerns for redness, swelling, drainage, or other medical issues.

## 2024-11-21 NOTE — PROGRESS NOTES
Ariana Panchal (1960)    Daily Progress Note-  Jimenez Douglas MD, MD                     Today's Date:   11/21/2024          Subjective:     Doing well postoperatively.    Pain level is high and she is required IV pain medication overnight    Objective:   Patient Vitals for the past 4 hrs:   Resp   11/21/24 0534 19   11/21/24 0504 19   11/21/24 0432 16   11/21/24 0402 18     Afebrile without signs of    Physical Exam:     The patient is awake and alert  Resting comfortably in bed    Operative extremity:    The dressing is clean dry and intact.  Dressings removed.  Incisions intact with Dermabond mesh dressing.  Appropriate passive motion present at the shoulder with pain during movement  Sensation and motor function intact distally      LABS   CBC:   Recent Labs     11/21/24  0445   WBC 13.4*   HGB 11.6*          Postoperative x-rays show well aligned reverse total shoulder replacement    Assessment and Plan     1.  POD # 1 left reverse total shoulder replacement    1:  Physical therapy consult for mobilization   -Sling for protection.  Nonweightbearing left upper extremity.  Okay for gentle range of motion intermittent CMS recovery  2:  DVT prophylaxis   -Aspirin and anticoagulation will be started  3:  Continue Pain Control   -wean to PO meds  4.  Medical management per hospitalist service  5:  D/C Planning:     -Plan discharge to home this afternoon    Mobilization and pain is controlled.         Jimenez Douglas MD

## 2024-11-21 NOTE — PROGRESS NOTES
Outpatient Pharmacy Progress Note for Meds-to-Beds    Total number of Prescriptions Filled: 3    Additional Documentation:  Medication(s) were delivered to the patient's room prior to discharge      Thank you for letting us serve your patients.  [address] [address]    Phone: [phone]    Fax: [phone]

## 2024-11-21 NOTE — PLAN OF CARE
Problem: Discharge Planning  Goal: Discharge to home or other facility with appropriate resources  11/21/2024 0906 by Agnes Masters RN  Outcome: Progressing  11/20/2024 2112 by Rand Francois LPN  Outcome: Progressing  11/20/2024 2112 by Rand Francois LPN  Outcome: Progressing  Flowsheets (Taken 11/20/2024 2109)  Discharge to home or other facility with appropriate resources: Identify barriers to discharge with patient and caregiver

## 2024-11-21 NOTE — CONSULTS
[facility] ACUTE CARE PHYSICAL THERAPY EVALUATION  Ariana Panchal, 1960, 1116/1116-A, 2024    History  [place]:  The encounter diagnosis was Status post reverse total shoulder replacement, left.  Patient  has a past medical history of Asthma, Cancer (HCC), Diabetes mellitus (HCC), Fibromyalgia, History of loop recorder, Hyperlipidemia, Hypertension, Neurocardiogenic syncope, Osteoarthritis, localized, shoulder, left, Syncope, and Type II or unspecified type diabetes mellitus without mention of complication, not stated as uncontrolled.  Patient  has a past surgical history that includes Cholecystectomy; Rotator cuff repair (Right); Insertable Cardiac Monitor; Colonoscopy; Elbow surgery (Right); Knee arthroscopy (Bilateral); Endoscopy, colon, diagnostic; Cardiac catheterization; Breast surgery (Left);  section; Port Surgery; Colonoscopy (N/A, 3/29/2022); and Upper gastrointestinal endoscopy (N/A, 3/29/2022).    Discharge Recommendation: OP PT    Equipment: none    Subjective:    Patient states:  \"this is my fourth shoulder surgery so I know what to do.\"      Pain:  8/10 pain L shoulder at sx site, RN in room medicating pt.      Communication with other providers:  Handoff to RN    Restrictions: NWB L UE, no ER >30, PROM okay, sling; fall risk, general precautions    Home Setup/Prior level of function  Social/Functional History  Lives With: Spouse, Daughter  Type of Home: House  Home Layout: One level  Home Access: Stairs to enter without rails  Entrance Stairs - Number of Steps: 1  Home Equipment: Rollator, Walker - Rolling, Cane  ADL Assistance: Independent  Homemaking Assistance: Independent  Homemaking Responsibilities: Yes  Ambulation Assistance: Independent (does not use AD at baseline)  Transfer Assistance: Independent    Examination of body systems (includes body structures/functions, activity/participation limitations):  Observation:  pt supine in bed with RN and RN student

## 2024-11-21 NOTE — DISCHARGE SUMMARY
DISCHARGE SUMMARY:  Jimenez Douglas MD    Date of Admission:      11/20/2024  Date of Discharge:    11/21/2024     Admission Diagnosis   Closed fracture of left proximal humerus [S42.202A]  Primary osteoarthritis of left shoulder [M19.012]  Traumatic closed displaced fracture of upper end of humerus, left, initial encounter [S42.202A]  Osteoarthritis, localized, shoulder, left [M19.012]   Discharge Diagnosis   Same    Procedure:    Left Total shoulder replacement 11/20/2024    Consultations:  IP CONSULT TO HOSPITALIST    Brief history and hospital stay.    Ariana Panchal is a 64 y.o. female who underwent total shoulder replacement procedure without complication.  Ariana Panchal was admitted to the floor following surgery.    Appropriate consults were obtained as outlined in progress notes. The patient’s diet was advanced as tolerated.  The patient remained hemodynamically stable and neurovascularly intact in the upper extremity throughout the hospital course.    On the day of discharge the patient's arm remained soft and showed no evidence of DVT.      Physical therapy and occupational therapy were consulted.  The patient progressed well with PT/OT throughout the stay.      DVT prophylaxis was initiated and will continue for 2 weeks.    The patients pain was controlled initially with IV pain medications and then was transitioned to oral pain medications prior to discharge    The patient was discharged to Home and will continue to follow the precautions outlined to them by us and PT/OT.     Condition on Discharge: Stable    Medications       Medication List        START taking these medications      docusate sodium 100 MG capsule  Commonly known as: COLACE  Take 1 capsule by mouth daily as needed for Constipation     oxyCODONE-acetaminophen 5-325 MG per tablet  Commonly known as: Percocet  Take 2 tablets by mouth every 6 hours as needed for Pain for up to 7 days. Intended

## 2024-11-21 NOTE — CARE COORDINATION
11/21/24 1309   Service Assessment   Patient Orientation Alert and Oriented   Cognition Alert   History Provided By Patient   Primary Caregiver Self   Support Systems Spouse/Significant Other   Patient's Healthcare Decision Maker is: Legal Next of Kin   PCP Verified by CM Yes   Last Visit to PCP Within last 3 months   Prior Functional Level Independent in ADLs/IADLs   Current Functional Level Assistance with the following:;Bathing;Toileting   Can patient return to prior living arrangement Yes   Ability to make needs known: Good   Family able to assist with home care needs: Yes   Would you like for me to discuss the discharge plan with any other family members/significant others, and if so, who? No   Financial Resources Other (Comment)   Community Resources None     Discharge planning initiated. Patient is from home, independent prior to admission. Patient has PCP and insurance. She plans on returning home and participating in OP therapy. Patient denies any discharge needs at this time. CM following for any new needs that may arise.

## 2024-11-25 ENCOUNTER — HOSPITAL ENCOUNTER (OUTPATIENT)
Dept: PHYSICAL THERAPY | Age: 64
Setting detail: THERAPIES SERIES
Discharge: HOME | End: 2024-11-25
Attending: ORTHOPAEDIC SURGERY
Payer: COMMERCIAL

## 2024-11-25 PROCEDURE — 97110 THERAPEUTIC EXERCISES: CPT

## 2024-11-25 PROCEDURE — 97161 PT EVAL LOW COMPLEX 20 MIN: CPT

## 2024-11-25 ASSESSMENT — PAIN DESCRIPTION - PAIN TYPE: TYPE: SURGICAL PAIN

## 2024-11-25 ASSESSMENT — PAIN DESCRIPTION - LOCATION: LOCATION: ARM;SHOULDER;ELBOW

## 2024-11-25 ASSESSMENT — PAIN DESCRIPTION - DESCRIPTORS: DESCRIPTORS: SHARP

## 2024-11-25 ASSESSMENT — PAIN SCALES - GENERAL: PAINLEVEL_OUTOF10: 0
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
## 2024-11-25 NOTE — PROGRESS NOTES
Physical Therapy: Initial Evaluation    Patient: Ariana Panchal (64 y.o. female)   Examination Date: 2024  Plan of Care Certification Period: 2024 to        :  1960 ;    Confirmed: Yes MRN: [id number]  CSN: [id number]   Insurance: Payor: [place] BCBS / Plan: ANTHEM PATHWAY X HMO JAMAAL / Product Type: *No Product type* /   Insurance ID: [id number] - ([place] BCBS) Secondary Insurance (if applicable):    Referring Physician: Jimenez Douglas MD Dr. Kevin Zartman   PCP: Gus Hatfield MD Visits to Date/Visits Approved:   /      No Show/Cancelled Appts:   /       Medical Diagnosis: Other closed displaced fracture of proximal end of left humerus, initial encounter [S42.292A]  Primary osteoarthritis of left shoulder [M19.012]  Hx of repair of left rotator cuff [Z98.890] L RTSA  Treatment Diagnosis: Decreased L ROM, strength, and endurance. Increased pain.     PERTINENT MEDICAL HISTORY   Patient Assessed for Rehabilitation Services: Yes       Medical History: Chart Reviewed: Yes   Past Medical History:   Diagnosis Date    Asthma     Cancer (HCC)     left breast, precancerous on right breast.    Diabetes mellitus (HCC)     Fibromyalgia     History of loop recorder     Hyperlipidemia     Hypertension     Neurocardiogenic syncope     Osteoarthritis, localized, shoulder, left 10/10/2024    Syncope     Type II or unspecified type diabetes mellitus without mention of complication, not stated as uncontrolled      Surgical History:   Past Surgical History:   Procedure Laterality Date    BREAST SURGERY Left     lumpectomy, right breast biopsy. lymph nodes removed    CARDIAC CATHETERIZATION      no stents.     SECTION      CHOLECYSTECTOMY      COLONOSCOPY      COLONOSCOPY N/A 3/29/2022    COLONOSCOPY POLYPECTOMY SNARE/COLD BIOPSY performed by Daljit Ansari MD at [facility] OR    ELBOW SURGERY Right     biceps    ENDOSCOPY, COLON, DIAGNOSTIC      INSERTABLE CARDIAC MONITOR      KNEE ARTHROSCOPY Bilateral
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
## 2024-11-26 ENCOUNTER — TELEPHONE (OUTPATIENT)
Dept: ORTHOPEDIC SURGERY | Age: 64
End: 2024-11-26

## 2024-11-26 DIAGNOSIS — Z96.612 STATUS POST REVERSE TOTAL SHOULDER REPLACEMENT, LEFT: Primary | ICD-10-CM

## 2024-11-26 RX ORDER — OXYCODONE AND ACETAMINOPHEN 5; 325 MG/1; MG/1
1 TABLET ORAL EVERY 6 HOURS PRN
Qty: 28 TABLET | Refills: 0 | Status: SHIPPED | OUTPATIENT
Start: 2024-11-26 | End: 2024-12-03

## 2024-11-26 NOTE — PRE-CERTIFICATION NOTE
Pt approved for 12 visits includes day of eval     [id number]    11/25/24-2/22/25     Auth# [id number]

## 2024-11-26 NOTE — TELEPHONE ENCOUNTER
Patient was told to call today to ask about a refill before the holiday on her pain medication. She is requesting her medication to be sent to the [facility] on [address].

## 2024-12-03 ENCOUNTER — HOSPITAL ENCOUNTER (OUTPATIENT)
Dept: PHYSICAL THERAPY | Age: 64
Setting detail: THERAPIES SERIES
Discharge: HOME | End: 2024-12-03
Attending: ORTHOPAEDIC SURGERY
Payer: COMMERCIAL

## 2024-12-03 PROCEDURE — 97016 VASOPNEUMATIC DEVICE THERAPY: CPT

## 2024-12-03 PROCEDURE — 97110 THERAPEUTIC EXERCISES: CPT

## 2024-12-03 PROCEDURE — 97140 MANUAL THERAPY 1/> REGIONS: CPT

## 2024-12-03 NOTE — FLOWSHEET NOTE
MODALITIES      Vaso 10'  10'              Other Therapeutic Activities/Education:  Patient received education on their current pathology and how their condition effects them with their functional activities. Patient understood discussion and questions were answered. Patient understands their activity limitations and understands rational for treatment progression.       Home Exercise Program:  *HO issued, reviewed and discussed with patient. All questions answered. Pt agreed to comply.        Manual Treatments:  PROM within protocol limits and to pt tolerance       Modalities: Vasopneumatic device applied for 10 minutes. Skin check prior to and after application, negative skin reaction.        Communication with other providers:  POC sent 11/25/24       Assessment:  Pt tolerates tx session well without adverse reactions or complications. Pt responds well to manual with decreased pain and tightness. Pt would benefit from skilled therapy to address weakness, ROM deficits, endurance deficits, and pain.    Pain after: 2/10 after vaso     Plan for Next Session:        Time In / Time Out:  1515 / 1605    Timed Code/Total Treatment Minutes:   40'/50'   1 TE   2 Man   1 Vaso     Next Progress Note due:  10th visit       Plan of Care Interventions:  [x] Therapeutic Exercise  [x] Modalities:  [x] Therapeutic Activity     [] Ultrasound  [] Estim  [] Gait Training      [] Cervical Traction [] Lumbar Traction  [x] Neuromuscular Re-education    [] Cold/hotpack [] Iontophoresis   [x] Instruction in HEP      [x] Vasopneumatic   [] Dry Needling    [x] Manual Therapy               [] Aquatic Therapy              Electronically signed by:  Prabha Darden PTA, 12/3/2024, 3:14 PM

## 2024-12-05 ENCOUNTER — HOSPITAL ENCOUNTER (OUTPATIENT)
Dept: PHYSICAL THERAPY | Age: 64
Setting detail: THERAPIES SERIES
Discharge: HOME | End: 2024-12-05
Attending: ORTHOPAEDIC SURGERY
Payer: COMMERCIAL

## 2024-12-05 PROCEDURE — 97016 VASOPNEUMATIC DEVICE THERAPY: CPT

## 2024-12-05 PROCEDURE — 97140 MANUAL THERAPY 1/> REGIONS: CPT

## 2024-12-05 PROCEDURE — 97110 THERAPEUTIC EXERCISES: CPT

## 2024-12-05 NOTE — FLOWSHEET NOTE
MODALITIES      Vaso 10'  10' 10'             Other Therapeutic Activities/Education:  Patient received education on their current pathology and how their condition effects them with their functional activities. Patient understood discussion and questions were answered. Patient understands their activity limitations and understands rational for treatment progression.       Home Exercise Program:  *HO issued, reviewed and discussed with patient. All questions answered. Pt agreed to comply.        Manual Treatments:  PROM within protocol limits and to pt tolerance       Modalities: Vasopneumatic device applied for 10 minutes. Skin check prior to and after application, negative skin reaction.        Communication with other providers:  POC sent 11/25/24       Assessment: Pt continues with PROM within protocol limits and pain tolerance and tolerates well without any adverse reaction. Pt feels stretching near end range and occasionally soreness. Pt continues with distal joint ROM as well. Pt was letting shoulder hand when sitting upright while performing distal joint exercises and felt pain, so she rested elbow on knee and was able to continue without pain. Pt receives vasocompression w/ cryo to address pain and inflammation at end of session. Pt would benefit from continued skilled therapy to progress towards goals listed above.  End Pain: 2/10    Plan for Next Session:        Time In / Time Out:  7237 - 8942    Timed Code/Total Treatment Minutes:   43'/53' 2 MAN 1 TE 1 Vaso    Next Progress Note due:  10th visit       Plan of Care Interventions:  [x] Therapeutic Exercise  [x] Modalities:  [x] Therapeutic Activity     [] Ultrasound  [] Estim  [] Gait Training      [] Cervical Traction [] Lumbar Traction  [x] Neuromuscular Re-education    [] Cold/hotpack [] Iontophoresis   [x] Instruction in HEP      [x] Vasopneumatic   [] Dry Needling    [x] Manual Therapy               [] Aquatic Therapy              Electronically

## 2024-12-10 ENCOUNTER — HOSPITAL ENCOUNTER (OUTPATIENT)
Dept: PHYSICAL THERAPY | Age: 64
Setting detail: THERAPIES SERIES
Discharge: HOME | End: 2024-12-10
Attending: ORTHOPAEDIC SURGERY
Payer: COMMERCIAL

## 2024-12-10 PROCEDURE — 97016 VASOPNEUMATIC DEVICE THERAPY: CPT

## 2024-12-10 PROCEDURE — 97140 MANUAL THERAPY 1/> REGIONS: CPT

## 2024-12-10 PROCEDURE — 97110 THERAPEUTIC EXERCISES: CPT

## 2024-12-10 NOTE — FLOWSHEET NOTE
Outpatient Physical Therapy  [place]           [x] Phone: [phone]   Fax: [phone]  [place]           [] Phone: [phone]   Fax: [phone]        Physical Therapy Daily Treatment Note  Date:  12/10/2024    Patient Name:  Ariana Panchal               :  1960                     MRN: [id number]    Restrictions/Precautions: Restrictions/Precautions: Surgical Protocols (Following Dr. Eldon Griffith post-op RTSA precautions as guideline.)  Diagnosis:   Other closed displaced fracture of proximal end of left humerus, initial encounter [S42.292A]  Primary osteoarthritis of left shoulder [M19.012]  Hx of repair of left rotator cuff [Z98.890] Diagnosis: L RTSA  Date of Injury/Surgery: surgery 24   Treatment Diagnosis:  Decreased L ROM, strength, and endurance. Increased pain.  Insurance/Certification information:  PRE-CERT   Referring Physician:  Jimenez Douglas MD Dr. 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  Short term goals  Time Frame for Short term goals: 6 weeks  Pt to achieve 90 degrees of shoulder flexion PROM with pain <3/10 to improve functiona mobility.  Pt to report a 25% improvement in symptoms to improve quality of life.  Pt to achieve 30 degrees of ER PROM with pain <3/10 to improve functional mobility.  Pt to achieve 75 degrees of scaption PROM with pain <3/10 to improve functional mobility.  Pt to demonstrate 4-/5 MMT of L biceps while maintaining pain level <3/10 to improve functional strength.  Long Term Goals  Time Frame for Long Term Goals: 12 weeks  Pt to demonstrate L UE MMTs of 4-/5 with pain <3/10 to improve functional strength.  Pt to demonstrate 90 degrees of AROM shoulder flexion with pain

## 2024-12-12 ENCOUNTER — HOSPITAL ENCOUNTER (OUTPATIENT)
Dept: PHYSICAL THERAPY | Age: 64
Setting detail: THERAPIES SERIES
Discharge: HOME | End: 2024-12-12
Attending: ORTHOPAEDIC SURGERY
Payer: COMMERCIAL

## 2024-12-12 ENCOUNTER — OFFICE VISIT (OUTPATIENT)
Dept: ORTHOPEDIC SURGERY | Age: 64
End: 2024-12-12

## 2024-12-12 VITALS — OXYGEN SATURATION: 97 % | HEIGHT: 70 IN | BODY MASS INDEX: 39.54 KG/M2 | HEART RATE: 83 BPM

## 2024-12-12 DIAGNOSIS — Z96.612 STATUS POST REVERSE TOTAL SHOULDER REPLACEMENT, LEFT: Primary | ICD-10-CM

## 2024-12-12 PROCEDURE — 99024 POSTOP FOLLOW-UP VISIT: CPT

## 2024-12-12 PROCEDURE — 97140 MANUAL THERAPY 1/> REGIONS: CPT

## 2024-12-12 PROCEDURE — 97110 THERAPEUTIC EXERCISES: CPT

## 2024-12-12 RX ORDER — SACUBITRIL AND VALSARTAN 24; 26 MG/1; MG/1
TABLET, FILM COATED ORAL
COMMUNITY
Start: 2024-11-26

## 2024-12-12 RX ORDER — INSULIN DEGLUDEC 200 U/ML
INJECTION, SOLUTION SUBCUTANEOUS
COMMUNITY
Start: 2024-12-03

## 2024-12-12 NOTE — FLOWSHEET NOTE
Outpatient Physical Therapy  [place]           [x] Phone: [phone]   Fax: [phone]  [place]           [] Phone: [phone]   Fax: [phone]        Physical Therapy Daily Treatment Note  Date:  2024    Patient Name:  Ariana Panchal               :  1960                     MRN: [id number]    Restrictions/Precautions: Restrictions/Precautions: Surgical Protocols (Following Dr. Eldon Griffith post-op RTSA precautions as guideline.)  Diagnosis:   Other closed displaced fracture of proximal end of left humerus, initial encounter [S42.292A]  Primary osteoarthritis of left shoulder [M19.012]  Hx of repair of left rotator cuff [Z98.890] Diagnosis: L RTSA  Date of Injury/Surgery: surgery 24   Treatment Diagnosis:  Decreased L ROM, strength, and endurance. Increased pain.  Insurance/Certification information:  PRE-CERT     Pt approved for 12 visits includes day of eval      TE NR Man TA     24-25      Auth# [id number]        Referring Physician:  Jimenez Douglas MD Dr. 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  Short term goals  Time Frame for Short term goals: 6 weeks  Pt to achieve 90 degrees of shoulder flexion PROM with pain <3/10 to improve functiona mobility.  Pt to report a 25% improvement in symptoms to improve quality of life.  Pt to achieve 30 degrees of ER PROM with pain <3/10 to improve functional mobility.  Pt to achieve 75 degrees of scaption PROM with pain <3/10 to improve functional mobility.  Pt to demonstrate 4-/5 MMT of L biceps while maintaining pain level <3/10 to improve functional strength.  Long Term Goals  Time Frame for Long Term Goals: 12 weeks  Pt to demonstrate L UE MMTs of 4-/5 with

## 2024-12-12 NOTE — PROGRESS NOTES
Patient returns to the office with a 3 week post op of a left RTSA. Pt stated that her prineo has remained intact. Pt has remained in the sling with no issues. She has been doing light activities with physical therapy. Pt stated she takes the pain medication PRN.

## 2024-12-12 NOTE — PATIENT INSTRUCTIONS
Continue with physical therapy.  Ice and medications as needed for pain.  Prineo bandage removed today.  May clean incision with soap and water.  Follow up in 3 weeks.

## 2024-12-13 NOTE — PROGRESS NOTES
12/12/2024   Chief Complaint   Patient presents with    Post-Op Check     Left RTSA        History of Present Illness:                             Ariana Panchal is a 64 y.o. female returning to the office today for continued postoperative management of her left reverse total shoulder arthroplasty.  Patient states she is doing well with her healing process.  She notes soreness and stiffness around the incision area.  She states her swelling and bruising has improved over the last week.  She denies any signs of infection from the incision area.  She has been performing light activities as instructed by physical therapy.  Patient denies any distal paresthesias or temperature changes into the lower aspects of the left arm.    Patient returns to the office with a 3 week post op of a left RTSA. Pt stated that her prineo has remained intact. Pt has remained in the sling with no issues. She has been doing light activities with physical therapy. Pt stated she takes the pain medication PRN.         Medical History  Patient's medications, allergies, past medical, surgical, social and family histories were reviewed and updated as appropriate.      Review of Systems                                            Examination:  General Exam:  Vitals: Pulse 83   Ht 1.778 m (5' 10\")   SpO2 97%   BMI 39.54 kg/m²    Physical Exam  Musculoskeletal:      Comments: Left shoulder exam: Patient left shoulder appears with a well-approximated anterior incision that shows no signs of infection such as erythema, fluctuance, drainage, or surrounding temperature changes.  Patient able to perform limited forward flexion and abduction motions to almost 90 degrees of flexion.  Robust physical exam of the shoulder not performed due to postsurgical status.  Patient maintains full range of motion of the elbow, forearm, hand, and fingers.  Radial, ulnar, and median nerve motor function intact through hand testing.  Radial pulse 2+, brisk

## 2024-12-18 ENCOUNTER — HOSPITAL ENCOUNTER (OUTPATIENT)
Dept: PHYSICAL THERAPY | Age: 64
Setting detail: THERAPIES SERIES
Discharge: HOME | End: 2024-12-18
Attending: ORTHOPAEDIC SURGERY
Payer: COMMERCIAL

## 2024-12-18 PROCEDURE — 97110 THERAPEUTIC EXERCISES: CPT

## 2024-12-18 PROCEDURE — 97140 MANUAL THERAPY 1/> REGIONS: CPT
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
## 2024-12-26 ENCOUNTER — HOSPITAL ENCOUNTER (OUTPATIENT)
Dept: PHYSICAL THERAPY | Age: 64
Setting detail: THERAPIES SERIES
Discharge: HOME | End: 2024-12-26
Attending: ORTHOPAEDIC SURGERY
Payer: COMMERCIAL

## 2024-12-26 NOTE — FLOWSHEET NOTE
Physical Therapy  Cancellation/No-show Note  Patient Name:  Ariana Panchal  :  1960   Date:  2024  Cancelled visits to date: 1  No-shows to date: 0    For today's appointment patient:  [x]  Cancelled  []  Rescheduled appointment  []  No-show     Reason given by patient:  []  Patient ill  []  Conflicting appointment  []  No transportation    []  Conflict with work  []  No reason given  [x]  Other:     Comments:  Fibromyalgia acting up    Electronically signed by:  Sabrina Dodge PTA    11:50 AM  2024

## 2024-12-30 ASSESSMENT — PROMIS GLOBAL HEALTH SCALE
IN THE PAST 7 DAYS, HOW WOULD YOU RATE YOUR PAIN ON AVERAGE [ON A SCALE FROM 0 (NO PAIN) TO 10 (WORST IMAGINABLE PAIN)]?: 3
TO WHAT EXTENT ARE YOU ABLE TO CARRY OUT YOUR EVERYDAY PHYSICAL ACTIVITIES SUCH AS WALKING, CLIMBING STAIRS, CARRYING GROCERIES, OR MOVING A CHAIR [ON A SCALE OF 1 (NOT AT ALL) TO 5 (COMPLETELY)]?: MODERATELY
IN THE PAST 7 DAYS, HOW OFTEN HAVE YOU BEEN BOTHERED BY EMOTIONAL PROBLEMS, SUCH AS FEELING ANXIOUS, DEPRESSED, OR IRRITABLE [ON A SCALE FROM 1 (NEVER) TO 5 (ALWAYS)]?: SOMETIMES
IN GENERAL, HOW WOULD YOU RATE YOUR MENTAL HEALTH, INCLUDING YOUR MOOD AND YOUR ABILITY TO THINK [ON A SCALE OF 1 (POOR) TO 5 (EXCELLENT)]?: VERY GOOD
IN GENERAL, HOW WOULD YOU RATE YOUR SATISFACTION WITH YOUR SOCIAL ACTIVITIES AND RELATIONSHIPS [ON A SCALE OF 1 (POOR) TO 5 (EXCELLENT)]?: FAIR
WHO IS THE PERSON COMPLETING THE PROMIS V1.1 SURVEY?: SELF
TO WHAT EXTENT ARE YOU ABLE TO CARRY OUT YOUR EVERYDAY PHYSICAL ACTIVITIES SUCH AS WALKING, CLIMBING STAIRS, CARRYING GROCERIES, OR MOVING A CHAIR [ON A SCALE OF 1 (NOT AT ALL) TO 5 (COMPLETELY)]?: MODERATELY
IN THE PAST 7 DAYS, HOW WOULD YOU RATE YOUR FATIGUE ON AVERAGE [ON A SCALE FROM 1 (NONE) TO 5 (VERY SEVERE)]?: MODERATE
IN GENERAL, HOW WOULD YOU RATE YOUR PHYSICAL HEALTH [ON A SCALE OF 1 (POOR) TO 5 (EXCELLENT)]?: FAIR
SUM OF RESPONSES TO QUESTIONS 3, 6, 7, & 8: 11
SUM OF RESPONSES TO QUESTIONS 2, 4, 5, & 10: 12
IN GENERAL, PLEASE RATE HOW WELL YOU CARRY OUT YOUR USUAL SOCIAL ACTIVITIES (INCLUDES ACTIVITIES AT HOME, AT WORK, AND IN YOUR COMMUNITY, AND RESPONSIBILITIES AS A PARENT, CHILD, SPOUSE, EMPLOYEE, FRIEND, ETC) [ON A SCALE OF 1 (POOR) TO 5 (EXCELLENT)]?: GOOD
IN GENERAL, WOULD YOU SAY YOUR QUALITY OF LIFE IS...[ON A SCALE OF 1 (POOR) TO 5 (EXCELLENT)]: GOOD
HOW IS THE PROMIS V1.1 BEING ADMINISTERED?: ELECTRONIC
HOW IS THE PROMIS V1.1 BEING ADMINISTERED?: ELECTRONIC
IN GENERAL, PLEASE RATE HOW WELL YOU CARRY OUT YOUR USUAL SOCIAL ACTIVITIES (INCLUDES ACTIVITIES AT HOME, AT WORK, AND IN YOUR COMMUNITY, AND RESPONSIBILITIES AS A PARENT, CHILD, SPOUSE, EMPLOYEE, FRIEND, ETC) [ON A SCALE OF 1 (POOR) TO 5 (EXCELLENT)]?: GOOD
IN THE PAST 7 DAYS, HOW WOULD YOU RATE YOUR FATIGUE ON AVERAGE [ON A SCALE FROM 1 (NONE) TO 5 (VERY SEVERE)]?: MODERATE
IN GENERAL, HOW WOULD YOU RATE YOUR SATISFACTION WITH YOUR SOCIAL ACTIVITIES AND RELATIONSHIPS [ON A SCALE OF 1 (POOR) TO 5 (EXCELLENT)]?: FAIR
IN GENERAL, WOULD YOU SAY YOUR HEALTH IS...[ON A SCALE OF 1 (POOR) TO 5 (EXCELLENT)]: FAIR
IN THE PAST 7 DAYS, HOW WOULD YOU RATE YOUR PAIN ON AVERAGE [ON A SCALE FROM 0 (NO PAIN) TO 10 (WORST IMAGINABLE PAIN)]?: 3
IN THE PAST 7 DAYS, HOW OFTEN HAVE YOU BEEN BOTHERED BY EMOTIONAL PROBLEMS, SUCH AS FEELING ANXIOUS, DEPRESSED, OR IRRITABLE [ON A SCALE FROM 1 (NEVER) TO 5 (ALWAYS)]?: SOMETIMES
WHO IS THE PERSON COMPLETING THE PROMIS V1.1 SURVEY?: SELF

## 2024-12-31 ENCOUNTER — HOSPITAL ENCOUNTER (OUTPATIENT)
Dept: PHYSICAL THERAPY | Age: 64
Setting detail: THERAPIES SERIES
Discharge: HOME | End: 2024-12-31
Attending: ORTHOPAEDIC SURGERY
Payer: COMMERCIAL

## 2024-12-31 PROCEDURE — 97140 MANUAL THERAPY 1/> REGIONS: CPT

## 2024-12-31 PROCEDURE — 97110 THERAPEUTIC EXERCISES: CPT

## 2024-12-31 NOTE — FLOWSHEET NOTE
post op    WARM UP                     TABLE      *AROM wrist flex/ext 30* 1# 30* 0# 30*   *AROM supination/pronation 30* 1# 25* 0# 30*   *AROM elbow flex/ext 30* 1# 20* 20*   *Ball-squeezes  30* Red digi-flex 20* Red digi-flex 20*   PROM flexion/scaption/ER Man  Man  As below    Iso flex/ext/abd  5*5\" ea  5*5\" ea    AAROM cane ER    10*3\"          STANDING                                                     PROPRIOCEPTION                                    MODALITIES      Vaso Held today  Ice at home  Ice at home             Other Therapeutic Activities/Education:  none      Home Exercise Program:  *HO issued, reviewed and discussed with patient. All questions answered. Pt agreed to comply.        Manual Treatments:  PROM within protocol limits and to pt tolerance (Flex *scap plane* to 120°, ER to tolerance). Gentle STM to bicep muscle. Passive elbow flexion/extension.      Modalities: Will ice at home.      Communication with other providers:  POC sent 11/25/24                           Assessment: Ariana was in more pain today with regression of ER ROM compared to last session. Bicep muscle is very tender and painful today. Due to increased pain we held on exercise progression. End session pain: 0/10        Plan for Next Session: Continue to progress per protocol.                      Time In / Time Out: 1340/1426      Timed Code/Total Treatment Minutes:  46'  2 man 1 TE   VASO NOT COVERED    Next Progress Note due:  10th visit                                                         Plan of Care Interventions:  [x] Therapeutic Exercise  [x] Modalities:  [x] Therapeutic Activity     [] Ultrasound  [] Estim  [] Gait Training      [] Cervical Traction [] Lumbar Traction  [x] Neuromuscular Re-education    [] Cold/hotpack [] Iontophoresis   [x] Instruction in HEP      [x] Vasopneumatic   [] Dry Needling    [x] Manual Therapy               [] Aquatic Therapy              Electronically signed by:  Sabrina Dodge PTA

## 2025-01-02 ENCOUNTER — HOSPITAL ENCOUNTER (OUTPATIENT)
Dept: PHYSICAL THERAPY | Age: 65
Setting detail: THERAPIES SERIES
Discharge: HOME | End: 2025-01-02
Attending: ORTHOPAEDIC SURGERY
Payer: COMMERCIAL

## 2025-01-02 ENCOUNTER — OFFICE VISIT (OUTPATIENT)
Dept: ORTHOPEDIC SURGERY | Age: 65
End: 2025-01-02

## 2025-01-02 VITALS — HEART RATE: 88 BPM | TEMPERATURE: 98.1 F | OXYGEN SATURATION: 95 %

## 2025-01-02 DIAGNOSIS — Z96.612 STATUS POST REVERSE TOTAL SHOULDER REPLACEMENT, LEFT: Primary | ICD-10-CM

## 2025-01-02 PROCEDURE — 97110 THERAPEUTIC EXERCISES: CPT

## 2025-01-02 PROCEDURE — 97140 MANUAL THERAPY 1/> REGIONS: CPT

## 2025-01-02 NOTE — PROGRESS NOTES
Patient seen in office today for: 6wk PO Left RTSA, DOS 11/20/2024    Patient reports 2/10 pain after leaving PT and is no longer taking narcotic pain management  RICE and medication are effective to alleviate pain and reduce swelling. Pain worsened by: Patient reports painful ROM & weight bearing.     Patient continues physical therapy.    Right handed

## 2025-01-02 NOTE — PATIENT INSTRUCTIONS
Continue weight-bearing as tolerated.  Continue range of motion exercises as instructed.  Ice and elevate as needed.  Tylenol or Motrin for pain.  Follow up as scheduled.     We are committed to providing you the best care possible.     If you receive a survey after visiting one of our offices, please take time to share your experience concerning your physician office visit.  These surveys are confidential and no health information about you is shared.     We are eager to improve for you and we are counting on your feedback to help make that happen. If you felt my care was outstanding, please mention me by name: Maribel VALDEZ
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
## 2025-01-07 ENCOUNTER — HOSPITAL ENCOUNTER (OUTPATIENT)
Dept: PHYSICAL THERAPY | Age: 65
Setting detail: THERAPIES SERIES
Discharge: HOME | End: 2025-01-07
Attending: ORTHOPAEDIC SURGERY
Payer: COMMERCIAL

## 2025-01-07 PROCEDURE — 97140 MANUAL THERAPY 1/> REGIONS: CPT

## 2025-01-07 PROCEDURE — 97110 THERAPEUTIC EXERCISES: CPT

## 2025-01-07 NOTE — FLOWSHEET NOTE
Outpatient Physical Therapy  [place]           [x] Phone: [phone]   Fax: [phone]  [place]           [] Phone: [phone]   Fax: [phone]        Physical Therapy Daily Treatment Note  Date:  2025    Patient Name:  Ariana Panchal               :  1960                     MRN: [id number]    Restrictions/Precautions: Restrictions/Precautions: Surgical Protocols (Following Dr. Eldon Griffith post-op RTSA precautions as guideline.)  Diagnosis:   Other closed displaced fracture of proximal end of left humerus, initial encounter [S42.292A]  Primary osteoarthritis of left shoulder [M19.012]  Hx of repair of left rotator cuff [Z98.890] Diagnosis: L RTSA  Date of Injury/Surgery: surgery 24   Treatment Diagnosis:  Decreased L ROM, strength, and endurance. Increased pain.  Insurance/Certification information:  PRE-CERT     Pt approved for 12 visits includes day of eval      TE NR Man TA     24-25      Auth# [id number]      Referring Physician:  Jimenez Douglas MD Dr. 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  Short term goals  Time Frame for Short term goals: 6 weeks  Pt to achieve 90 degrees of shoulder flexion PROM with pain <3/10 to improve functiona mobility. PROM flexion 127° ()  Pt to report a 25% improvement in symptoms to improve quality of life.  Pt to achieve 30 degrees of ER PROM with pain <3/10 to improve functional mobility. 15° ()  Pt to achieve 75 degrees of scaption PROM with pain <3/10 to improve functional mobility.  Pt to demonstrate 4-/5 MMT of L biceps while maintaining pain level <3/10 to improve functional strength.  Long Term Goals  Time Frame for Long Term Goals: 12 weeks  Pt to

## 2025-01-09 ENCOUNTER — HOSPITAL ENCOUNTER (OUTPATIENT)
Dept: PHYSICAL THERAPY | Age: 65
Setting detail: THERAPIES SERIES
Discharge: HOME | End: 2025-01-09
Attending: ORTHOPAEDIC SURGERY
Payer: COMMERCIAL

## 2025-01-09 PROCEDURE — 97140 MANUAL THERAPY 1/> REGIONS: CPT

## 2025-01-09 PROCEDURE — 97110 THERAPEUTIC EXERCISES: CPT

## 2025-01-09 NOTE — FLOWSHEET NOTE
Exercise/Equipment 1/2/2025 #8 1/7/25 #9 1/9/2025 #10      6 weeks post op 1/1/2025 1/8=7wks    WARM UP       Pulleys    X10 scaption 10* scaption          TABLE      *AROM wrist flex/ext - HEP -   *AROM supination/pronation - HEP -   *AROM elbow flex/ext 30* 1# seated 2x10 1# 2*10 seated    *Ball-squeezes  - HEP -   PROM flexion/scaption/ER As below  See manual See manual    Iso flex/ext/abd 10*5\" ea  HEP -   AAROM cane ER  10*3\" X10 5\" 10*5\"    AAROM cane scaption in supine  2*5 X10 3\" Standing 10*3\"    SB on table Scap Squeeze  NEXT 10*3\"         STANDING      Triceps Extension TB  NEXT YTB 10*                                            PROPRIOCEPTION                                    MODALITIES      Vaso  Ice at home  Ice at home              Other Therapeutic Activities/Education:  none      Home Exercise Program:  *HO issued, reviewed and discussed with patient. All questions answered. Pt agreed to comply.    12/31: Added isometrics and cane ER.       Manual Treatments:  ER/IR/ABD/FLEX PROM within protocol limits and to pt tolerance. Gentle STM to bicep muscle. Passive elbow flexion/extension x10'      Modalities: Will ice at home      Communication with other providers:  POC sent 11/25/24                           Assessment: Ariana tolerated today's session fair. She does still have increased pain in the bicep muscle with most activities. End session pain: same     Plan for Next Session: --                   Time In / Time Out: 1300/1340      Timed Code/Total Treatment Minutes: 40' 1 man 2 TE     VASO NOT COVERED      Next Progress Note due:  10th visit                                                         Plan of Care Interventions:  [x] Therapeutic Exercise  [x] Modalities:  [x] Therapeutic Activity     [] Ultrasound  [] Estim  [] Gait Training      [] Cervical Traction [] Lumbar Traction  [x] Neuromuscular Re-education    [] Cold/hotpack [] Iontophoresis   [x] Instruction in HEP      [x] Vasopneumatic

## 2025-01-14 ENCOUNTER — HOSPITAL ENCOUNTER (OUTPATIENT)
Dept: PHYSICAL THERAPY | Age: 65
Setting detail: THERAPIES SERIES
Discharge: HOME | End: 2025-01-14
Attending: ORTHOPAEDIC SURGERY
Payer: COMMERCIAL

## 2025-01-14 PROCEDURE — 97110 THERAPEUTIC EXERCISES: CPT

## 2025-01-14 PROCEDURE — 97112 NEUROMUSCULAR REEDUCATION: CPT

## 2025-01-14 NOTE — FLOWSHEET NOTE
Outpatient Physical Therapy  [place]           [x] Phone: [phone]   Fax: [phone]  [place]           [] Phone: [phone]   Fax: [phone]        Physical Therapy Daily Treatment Note  Date:  2025    Patient Name:  Ariana Panchal               :  1960                     MRN: [id number]    Restrictions/Precautions: Restrictions/Precautions: Surgical Protocols (Following Dr. Eldon Griffith post-op RTSA precautions as guideline.)  Diagnosis:   Other closed displaced fracture of proximal end of left humerus, initial encounter [S42.292A]  Primary osteoarthritis of left shoulder [M19.012]  Hx of repair of left rotator cuff [Z98.890] Diagnosis: L RTSA  Date of Injury/Surgery: surgery 24   Treatment Diagnosis:  Decreased L ROM, strength, and endurance. Increased pain.  Insurance/Certification information:  PRE-CERT     Pt approved for 12 visits includes day of eval      TE NR Man TA     24-25      Auth# [id number]      Referring Physician:  Jimenez Douglas MD Dr. 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  Short term goals  Time Frame for Short term goals: 6 weeks  Pt to achieve 90 degrees of shoulder flexion PROM with pain <3/10 to improve functiona mobility. PROM flexion 127° ()  Pt to report a 25% improvement in symptoms to improve quality of life.  Pt to achieve 30 degrees of ER PROM with pain <3/10 to improve functional mobility. 15° ()  Pt to achieve 75 degrees of scaption PROM with pain <3/10 to improve functional mobility.  Pt to demonstrate 4-/5 MMT of L biceps while maintaining pain level <3/10 to improve functional strength.  Long Term Goals  Time Frame for Long Term Goals: 12 weeks  Pt to

## 2025-01-17 ENCOUNTER — HOSPITAL ENCOUNTER (OUTPATIENT)
Dept: PHYSICAL THERAPY | Age: 65
Setting detail: THERAPIES SERIES
Discharge: HOME | End: 2025-01-17
Attending: ORTHOPAEDIC SURGERY
Payer: COMMERCIAL

## 2025-01-17 PROCEDURE — 97110 THERAPEUTIC EXERCISES: CPT

## 2025-01-17 NOTE — PRE-PROCEDURE INSTRUCTIONS
Pt approved for 4 more visits     1/20/25-2/18/25    [id number] only     Auth# [id number]

## 2025-01-17 NOTE — PROGRESS NOTES
Outpatient Physical Therapy           [place]           [] Phone: [phone]   Fax: [phone]  [place]           [] Phone: [phone]   Fax: [phone]      To: Jimenez Douglas MD     From: Esthela Cota PT, PT     Patient: Ariana Panchal                    : 1960  Diagnosis:  Other closed displaced fracture of proximal end of left humerus, initial encounter [S42.292A]  Primary osteoarthritis of left shoulder [M19.012]  Hx of repair of left rotator cuff [Z98.890]        Treatment Diagnosis:       Date: 2025  [x]  Progress Note                []  Discharge Note    Evaluation Date:   25  Total Visits to date:   12 Cancels/No-shows to date:  0    Subjective:  Ariana reports her pain is a 2/10, but moderate in the biceps area. She states everything has improved except for the biceps pain. She was sore for a day after her last session and this elevated her pain. She wants to continue working on getting her arm up overhead, play her bass, and improve her pain.      Plan of Care/Treatment to date:  [x] Therapeutic Exercise    [] Modalities:  [x] Therapeutic Activity     [] Ultrasound  [] Electrical Stimulation  [] Gait Training      [] Cervical Traction   [] Lumbar Traction  [x] Neuromuscular Re-education  [] Cold/hotpack [] Iontophoresis  [x] Instruction in HEP      Other:  [x] Manual Therapy       [x]  Vasopneumatic  [] Aquatic Therapy       []   Dry Needle Therapy                      Objective/Significant Findings At Last Visit/Comments:         Left PROM  Right PROM      Scaption: 140 degrees in reclined position    L Shoulder Flex  (0-180): 140 degrees in reclined position    L Shoulder ER  (0-90): 40 degrees in reclined position     L Shoulder IR: 30 deg       PROM Elbow: full ROM    LUE MMT: WNL except 3+/5 flexion, 3+/5 abduction, 3-/5 ER, 3+/5 IR  RUE MMT: WNL    Palpations: tenderness severe over AC joint, no significant separation     Quick DASH: 43%

## 2025-01-17 NOTE — FLOWSHEET NOTE
Outpatient Physical Therapy  [place]           [x] Phone: [phone]   Fax: [phone]  [place]           [] Phone: [phone]   Fax: [phone]        Physical Therapy Daily Treatment Note  Date:  2025    Patient Name:  Ariana Panchal               :  1960                     MRN: [id number]    Restrictions/Precautions: Restrictions/Precautions: Surgical Protocols (Following Dr. Eldon Griffith post-op RTSA precautions as guideline.)  Diagnosis:   Other closed displaced fracture of proximal end of left humerus, initial encounter [S42.292A]  Primary osteoarthritis of left shoulder [M19.012]  Hx of repair of left rotator cuff [Z98.890] Diagnosis: L RTSA  Date of Injury/Surgery: surgery 24   Treatment Diagnosis:  Decreased L ROM, strength, and endurance. Increased pain.  Insurance/Certification information:  PRE-CERT     Pt approved for 12 visits includes day of eval      TE NR Man TA     24-25      Auth# [id number]      Referring Physician:  Jimenez Douglas MD Dr. 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  Short term goals  Time Frame for Short term goals: 6 weeks  Pt to achieve 90 degrees of shoulder flexion PROM with pain <3/10 to improve functiona mobility. MET except painful 7/10  Pt to report a 25% improvement in symptoms to improve quality of life 20-25% improvement   Pt to achieve 30 degrees of ER PROM with pain <3/10 to improve functional mobility. MET except painful 8/10  Pt to achieve 75 degrees of scaption PROM with pain <3/10 to improve functional mobility MET  Pt to demonstrate 4-/5 MMT of L biceps while maintaining pain level <3/10 to improve functional strength MET  Long Term Goals  Time Frame

## 2025-01-22 ENCOUNTER — HOSPITAL ENCOUNTER (OUTPATIENT)
Dept: PHYSICAL THERAPY | Age: 65
Setting detail: THERAPIES SERIES
Discharge: HOME | End: 2025-01-22
Attending: ORTHOPAEDIC SURGERY
Payer: COMMERCIAL

## 2025-01-23 ENCOUNTER — HOSPITAL ENCOUNTER (OUTPATIENT)
Dept: PHYSICAL THERAPY | Age: 65
Discharge: HOME | End: 2025-01-23
Attending: ORTHOPAEDIC SURGERY

## 2025-01-29 ENCOUNTER — HOSPITAL ENCOUNTER (OUTPATIENT)
Dept: PHYSICAL THERAPY | Age: 65
Setting detail: THERAPIES SERIES
Discharge: HOME | End: 2025-01-29
Attending: ORTHOPAEDIC SURGERY
Payer: COMMERCIAL

## 2025-01-29 PROCEDURE — 97110 THERAPEUTIC EXERCISES: CPT

## 2025-01-29 NOTE — FLOWSHEET NOTE
Outpatient Physical Therapy  [place]           [x] Phone: [phone]   Fax: [phone]  [place]           [] Phone: [phone]   Fax: [phone]        Physical Therapy Daily Treatment Note  Date:  2025    Patient Name:  Ariana Panchal               :  1960                     MRN: [id number]    Restrictions/Precautions: Restrictions/Precautions: Surgical Protocols (Following Dr. Eldon Griffith post-op RTSA precautions as guideline.)  Diagnosis:   Other closed displaced fracture of proximal end of left humerus, initial encounter [S42.292A]  Primary osteoarthritis of left shoulder [M19.012]  Hx of repair of left rotator cuff [Z98.890] Diagnosis: L RTSA  Date of Injury/Surgery: surgery 24   Treatment Diagnosis:  Decreased L ROM, strength, and endurance. Increased pain.  Insurance/Certification information:  PRE-CERT     Pt approved for 12 visits includes day of eval      TE NR Man TA     24-25      Auth# [id number]      Pt approved for 4 more visits      25-25     [id number] only      Auth# [id number]  Referring Physician:  Jimenez Douglas MD Dr. 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  Short term goals  Time Frame for Short term goals: 6 weeks  Pt to achieve 90 degrees of shoulder flexion PROM with pain <3/10 to improve functiona mobility. MET except painful 7/10  Pt to report a 25% improvement in symptoms to improve quality of life 20-25% improvement   Pt to achieve 30 degrees of ER PROM with pain <3/10 to improve functional mobility. MET except painful 8/10  Pt to achieve 75 degrees of scaption PROM with pain <3/10 to improve functional mobility MET  Pt to demonstrate 4-/5

## 2025-02-05 ENCOUNTER — APPOINTMENT (OUTPATIENT)
Dept: PHYSICAL THERAPY | Age: 65
End: 2025-02-05
Attending: ORTHOPAEDIC SURGERY
Payer: COMMERCIAL

## 2025-02-12 ENCOUNTER — HOSPITAL ENCOUNTER (OUTPATIENT)
Dept: PHYSICAL THERAPY | Age: 65
Setting detail: THERAPIES SERIES
Discharge: HOME | End: 2025-02-12
Attending: ORTHOPAEDIC SURGERY
Payer: COMMERCIAL

## 2025-02-12 PROCEDURE — 97110 THERAPEUTIC EXERCISES: CPT

## 2025-02-12 NOTE — FLOWSHEET NOTE
Outpatient Physical Therapy  [place]           [x] Phone: [phone]   Fax: [phone]  [place]           [] Phone: [phone]   Fax: [phone]        Physical Therapy Daily Treatment Note  Date:  2025    Patient Name:  Ariana Panchal               :  1960                     MRN: [id number]    Restrictions/Precautions: Restrictions/Precautions: Surgical Protocols (Following Dr. Eldon Griffith post-op RTSA precautions as guideline.)  Diagnosis:   Other closed displaced fracture of proximal end of left humerus, initial encounter [S42.292A]  Primary osteoarthritis of left shoulder [M19.012]  Hx of repair of left rotator cuff [Z98.890] Diagnosis: L RTSA  Date of Injury/Surgery: surgery 24   Treatment Diagnosis:  Decreased L ROM, strength, and endurance. Increased pain.  Insurance/Certification information:  PRE-CERT     Pt approved for 12 visits includes day of eval      TE NR Man TA     24-25      Auth# [id number]      Pt approved for 4 more visits      25-25     [id number] only      Auth# [id number]  Referring Physician:  Jimenez Douglas MD Dr. 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  Short term goals  Time Frame for Short term goals: 6 weeks  Pt to achieve 90 degrees of shoulder flexion PROM with pain <3/10 to improve functiona mobility. MET except painful 7/10  Pt to report a 25% improvement in symptoms to improve quality of life 20-25% improvement   Pt to achieve 30 degrees of ER PROM with pain <3/10 to improve functional mobility. MET except painful 8/10  Pt to achieve 75 degrees of scaption PROM with pain <3/10 to improve functional mobility MET  Pt to demonstrate

## 2025-02-13 ENCOUNTER — OFFICE VISIT (OUTPATIENT)
Dept: ORTHOPEDIC SURGERY | Age: 65
End: 2025-02-13

## 2025-02-13 VITALS — HEIGHT: 70 IN | OXYGEN SATURATION: 98 % | RESPIRATION RATE: 15 BRPM | BODY MASS INDEX: 39.54 KG/M2 | HEART RATE: 88 BPM

## 2025-02-13 DIAGNOSIS — Z96.612 STATUS POST REVERSE TOTAL SHOULDER REPLACEMENT, LEFT: Primary | ICD-10-CM

## 2025-02-13 RX ORDER — LISINOPRIL 10 MG/1
10 TABLET ORAL DAILY
COMMUNITY
Start: 2025-01-30

## 2025-02-13 NOTE — PROGRESS NOTES
Patient returns to the office with a 3 month post op of a left RTSA. Pt stated that her shoulder is continuing to have sharp pains in the shoulder with any movement. Pt stated that she is also having issues with the bicep and elbow with tightness and weakness. Pt stated that she has continued with physical therapy.

## 2025-02-13 NOTE — PATIENT INSTRUCTIONS
Continue weight-bearing as tolerated.  Continue range of motion exercises as instructed.  Ice and elevate as needed.  Tylenol or Motrin for pain.  Follow up in 6 weeks.

## 2025-02-18 ENCOUNTER — HOSPITAL ENCOUNTER (OUTPATIENT)
Dept: PHYSICAL THERAPY | Age: 65
Setting detail: THERAPIES SERIES
Discharge: HOME | End: 2025-02-18
Attending: ORTHOPAEDIC SURGERY
Payer: COMMERCIAL

## 2025-02-18 PROCEDURE — 97110 THERAPEUTIC EXERCISES: CPT

## 2025-02-18 NOTE — DISCHARGE SUMMARY
Outpatient Physical Therapy           [place]           [] Phone: [phone]   Fax: [phone]  [place]           [] Phone: [phone]   Fax: [phone]      To: Jimenez Douglas MD     From: Esthela Cota PT, PT     Patient: Ariana Panchal                    : 1960  Diagnosis:  Other closed displaced fracture of proximal end of left humerus, initial encounter [S42.292A]  Primary osteoarthritis of left shoulder [M19.012]  Hx of repair of left rotator cuff [Z98.890]        Treatment Diagnosis:       Date: 2025  []  Progress Note                [x]  Discharge Note    Evaluation Date:   25  Total Visits to date:   15 Cancels/No-shows to date:  0    Subjective:  Ariana reports she say ortho and they told her to trial massage roller over the biceps and shoulder. She asked for an updated home program.     Plan of Care/Treatment to date:  [x] Therapeutic Exercise    [] Modalities:  [x] Therapeutic Activity     [] Ultrasound  [] Electrical Stimulation  [] Gait Training      [] Cervical Traction   [] Lumbar Traction  [x] Neuromuscular Re-education  [] Cold/hotpack [] Iontophoresis  [x] Instruction in HEP      Other:  [x] Manual Therapy       [x]  Vasopneumatic  [] Aquatic Therapy       []   Dry Needle Therapy                      Objective/Significant Findings At Last Visit/Comments:         Left PROM  Left AROM:   Scaption: 140 degrees in reclined position    L Shoulder Flex  (0-180): 140 degrees in reclined position    L Shoulder ER  (0-90): 40 degrees in reclined position     L Shoulder IR: 30 deg Flexion: 100 deg     Abduction: 70 deg *7/10    Functional IR: reach L ear *7/10    Functional ER: reach L back pocket *4/10      PROM Elbow: full ROM    LUE MMT: WNL except 3+/5 flexion, 4-/5 extension, 3+/5 abduction, 3-/5 ER, 3-/5 IR  *all directions severely painful 7/10  RUE MMT: WNL except 4-/5 ER     Strength: 50.1/52.4/48.0 R, 40.2/41.9/36.9 L    Palpations: tenderness over AC

## 2025-02-18 NOTE — FLOWSHEET NOTE
2/18/25 #15          WARM UP      Pulleys    x10    UBE 3.5' fwd 5'   TABLE     *AROM elbow flex/ext 2x10 3#    PROM flexion/scaption/ER     AAROM cane ER  X15 5\" seated    AAROM cane scaption in supine      SB on table Scap Squeeze          STANDING     Triceps Extension TB 2x15 YTB    TB Row 2x10 GTB    Ball Roll Up the Wall  RSB 1x5 3\"    TB Shoulder Extension     Deltoid Iso X10 5\" flex/abd                   PROPRIOCEPTION                              MODALITIES     Vaso              Other Therapeutic Activities/Education:  none      Home Exercise Program:  *HO issued, reviewed and discussed with patient. All questions answered. Pt agreed to comply.    12/31: Added isometrics and cane ER.       Manual Treatments:      Modalities: Will ice at home      Communication with other providers:  POC sent 11/25/24                           Assessment:  Ariana has completed 15 visits since the start of therapy on 11/25/25. She has seen slow improvements in shoulder ROM and strength. Has been limited with progressions in the clinic due to increased proximal biceps pain. Her pain tolerance is super limited today and has difficulties with PROM ER. Educated on frequently extending the elbow and keeping it out of the protective position. Discussed reintegration of more activities with the LUE. At this time, patient will be discharged to continue her home program independently.     End session pain: 3/10 anterior shoulder     Plan for Next Session: --                   Time In / Time Out: 5564-2405      Timed Code/Total Treatment Minutes: 38'  (3) TE     VASO NOT COVERED      Next Progress Note due:  10th visit                                                         Plan of Care Interventions:  [x] Therapeutic Exercise  [x] Modalities:  [x] Therapeutic Activity     [] Ultrasound  [] Estim  [] Gait Training      [] Cervical Traction [] Lumbar Traction  [x] Neuromuscular Re-education    [] Cold/hotpack [] Iontophoresis   [x]

## 2025-02-20 NOTE — PROGRESS NOTES
2/13/2025   Chief Complaint   Patient presents with    Post-Op Check     Right RTSA         History of Present Illness:                  Todays HPI:  Ariana Panchal is a 64 y.o. female returning to the office today for continued postoperative management regarding her left total shoulder arthroplasty.  Patient states she has made some gains in physical therapy but is still having tenderness in the upper arm.  She believes she should be doing better at this stage then she is currently.  She is struggling with overhead motions.    Patient returns to the office with a 3 month post op of a left RTSA. Pt stated that her shoulder is continuing to have sharp pains in the shoulder with any movement. Pt stated that she is also having issues with the bicep and elbow with tightness and weakness. Pt stated that she has continued with physical therapy.        Previous HPI:             Ariana Panchal is a 64 y.o. female who returns today for follow-up of her left reverse total shoulder replacement.  She has been making good progress with therapy.  She is improving with her range of motion and function.  She denies any problems or concerns.  She has been able to discontinue her pain medication.  She is pleased with her progress.    Patient seen in office today for: 6wk PO Left RTSA, DOS 11/20/2024     Patient reports 2/10 pain after leaving PT and is no longer taking narcotic pain management  RICE and medication are effective to alleviate pain and reduce swelling. Pain worsened by: Patient reports painful ROM & weight bearing.      Patient continues physical therapy.     Right handed       Medical History  Patient's medications, allergies, past medical, surgical, social and family histories were reviewed and updated as appropriate.      Review of Systems                                            Examination:  General Exam:  Vitals: Pulse 88   Resp 15   Ht 1.778 m (5' 10\")   SpO2 98%   BMI 39.54 kg/m²    Physical  no fever

## 2025-04-01 ENCOUNTER — OFFICE VISIT (OUTPATIENT)
Dept: ORTHOPEDIC SURGERY | Age: 65
End: 2025-04-01

## 2025-04-01 VITALS
RESPIRATION RATE: 14 BRPM | OXYGEN SATURATION: 99 % | HEART RATE: 93 BPM | BODY MASS INDEX: 39.37 KG/M2 | HEIGHT: 70 IN | WEIGHT: 275 LBS

## 2025-04-01 DIAGNOSIS — M77.02 MEDIAL EPICONDYLITIS OF LEFT ELBOW: Primary | ICD-10-CM

## 2025-04-01 RX ORDER — TRIAMCINOLONE ACETONIDE 40 MG/ML
40 INJECTION, SUSPENSION INTRA-ARTICULAR; INTRAMUSCULAR ONCE
Status: COMPLETED | OUTPATIENT
Start: 2025-04-01 | End: 2025-04-01

## 2025-04-01 RX ADMIN — TRIAMCINOLONE ACETONIDE 40 MG: 40 INJECTION, SUSPENSION INTRA-ARTICULAR; INTRAMUSCULAR at 13:38

## 2025-04-01 NOTE — PATIENT INSTRUCTIONS
Continue weight-bearing as tolerated.  Continue range of motion exercises as instructed.  Ice and elevate as needed.  Tylenol or Motrin for pain.  Injection given into the left elbow.  Follow up as needed.    We are committed to providing you the best care possible.  If you receive a survey after visiting one of our offices, please take time to share your experience concerning your physician office visit.  These surveys are confidential and no health information about you is shared.  We are eager to improve for you and we are counting on your feedback to help make that happen.

## 2025-04-05 NOTE — PROGRESS NOTES
4/1/2025   Chief Complaint   Patient presents with    Shoulder Pain     Left RTSA DOS 11/20/2024        History of Present Illness:                  Todays HPI:  Patient is a 65-year-old female returning to the office today for continued management of her left reverse total shoulder arthroplasty, DOS 11/20/2024.  Patient has been going through physical therapy and has noticed slight gains in her range of motion and comfort levels.  She still states there is discomfort with certain positional changes in the upper arm.  Patient does note increased tenderness at the medial epicondyle of the elbow.  She is unsure if this is from the physical therapy or as a sequela of the upper arm injury.    Previous HPI:  Ariana Panchal is a 64 y.o. female returning to the office today for continued postoperative management regarding her left total shoulder arthroplasty.  Patient states she has made some gains in physical therapy but is still having tenderness in the upper arm.  She believes she should be doing better at this stage then she is currently.  She is struggling with overhead motions.    Patient returns to the office with a 3 month post op of a left RTSA. Pt stated that her shoulder is continuing to have sharp pains in the shoulder with any movement. Pt stated that she is also having issues with the bicep and elbow with tightness and weakness. Pt stated that she has continued with physical therapy.          Medical History  Patient's medications, allergies, past medical, surgical, social and family histories were reviewed and updated as appropriate.      Review of Systems                                            Examination:  General Exam:  Vitals: Pulse 93   Resp 14   Ht 1.778 m (5' 10\")   Wt 124.7 kg (275 lb)   SpO2 99%   BMI 39.46 kg/m²    Physical Exam     Left upper extremity:  Well-healed surgical scar.  No erythema, drainage, or induration.  Good active range of motion with forward elevation of 110

## 2025-07-22 ENCOUNTER — TRANSCRIBE ORDERS (OUTPATIENT)
Dept: GENERAL RADIOLOGY | Age: 65
End: 2025-07-22

## 2025-07-22 ENCOUNTER — HOSPITAL ENCOUNTER (OUTPATIENT)
Dept: GENERAL RADIOLOGY | Age: 65
Discharge: HOME | End: 2025-07-22
Payer: COMMERCIAL

## 2025-07-22 DIAGNOSIS — M54.50 LOW BACK PAIN, UNSPECIFIED BACK PAIN LATERALITY, UNSPECIFIED CHRONICITY, UNSPECIFIED WHETHER SCIATICA PRESENT: ICD-10-CM

## 2025-07-22 DIAGNOSIS — M54.2 CERVICALGIA: ICD-10-CM

## 2025-07-22 DIAGNOSIS — M54.2 CERVICALGIA: Primary | ICD-10-CM

## 2025-07-22 PROCEDURE — 72100 X-RAY EXAM L-S SPINE 2/3 VWS: CPT

## 2025-07-22 PROCEDURE — 72050 X-RAY EXAM NECK SPINE 4/5VWS: CPT

## 2025-08-21 ENCOUNTER — HOSPITAL ENCOUNTER (OUTPATIENT)
Dept: PHYSICAL THERAPY | Age: 65
Setting detail: THERAPIES SERIES
Discharge: HOME | End: 2025-08-21
Payer: COMMERCIAL

## 2025-08-21 PROCEDURE — 97161 PT EVAL LOW COMPLEX 20 MIN: CPT

## 2025-08-21 PROCEDURE — 97110 THERAPEUTIC EXERCISES: CPT

## 2025-08-26 ENCOUNTER — HOSPITAL ENCOUNTER (OUTPATIENT)
Dept: PHYSICAL THERAPY | Age: 65
Setting detail: THERAPIES SERIES
Discharge: HOME | End: 2025-08-26
Payer: COMMERCIAL

## 2025-08-26 PROCEDURE — 97110 THERAPEUTIC EXERCISES: CPT

## 2025-09-04 ENCOUNTER — HOSPITAL ENCOUNTER (OUTPATIENT)
Dept: PHYSICAL THERAPY | Age: 65
Setting detail: THERAPIES SERIES
Discharge: HOME | End: 2025-09-04
Payer: COMMERCIAL

## 2025-09-04 PROCEDURE — 97110 THERAPEUTIC EXERCISES: CPT

## (undated) DEVICE — FAN SPRAY KIT: Brand: PULSAVAC®

## (undated) DEVICE — DUAL CUT SAGITTAL BLADE

## (undated) DEVICE — ADHESIVE SKIN CLOSURE WND 8.661X1.5 IN 22 CM LIQUIBAND SECUR

## (undated) DEVICE — GLOVE ORANGE PI 7 1/2   MSG9075

## (undated) DEVICE — SUTURE FIBERWIRE 2 L97CM NONABSORBABLE BLU L36.6MM 1/2 CIR AR7206

## (undated) DEVICE — PENCIL ES CRD L10FT HND SWCHING ROCK SWCH W/ EDGE COAT BLDE

## (undated) DEVICE — YANKAUER,FLEXIBLE HANDLE,REGLR CAPACITY: Brand: MEDLINE INDUSTRIES, INC.

## (undated) DEVICE — PROTECTOR EYE PT SELF ADH NS OPT GRD LF

## (undated) DEVICE — 3M™ IOBAN™ 2 ANTIMICROBIAL INCISE DRAPE 6650EZ: Brand: IOBAN™ 2

## (undated) DEVICE — COVER,TABLE,44X90,STERILE: Brand: MEDLINE

## (undated) DEVICE — DRESSING,GAUZE,XEROFORM,CURAD,1"X8",ST: Brand: CURAD

## (undated) DEVICE — MARKER SURG SKIN UTIL REGULAR/FINE 2 TIP W/ RUL AND 9 LBL

## (undated) DEVICE — BIT DRL L110MM DIA2MM QUIK CONN W/O STP N RADLUC REUSE

## (undated) DEVICE — 4-PORT MANIFOLD: Brand: NEPTUNE 2

## (undated) DEVICE — ENDOSCOPY KIT: Brand: MEDLINE INDUSTRIES, INC.

## (undated) DEVICE — TOWEL,OR,DSP,ST,BLUE,STD,6/PK,12PK/CS: Brand: MEDLINE

## (undated) DEVICE — Z INACTIVE NO ACTIVE SUPPLIER APPLICATOR MEDICATED 26 CC TINT HI-LITE ORNG STRL CHLORAPREP

## (undated) DEVICE — FORCEPS BX L240CM JAW DIA2.8MM L CAP W/ NDL MIC MESH TOOTH

## (undated) DEVICE — SPONGE LAP W18XL18IN WHT COT 4 PLY FLD STRUNG RADPQ DISP ST 2 PER PACK

## (undated) DEVICE — Z DISCONTINUED (USE MFG CAT MVABO)  TUBING GAS SAMPLING STD 6.5 FT FEMALE CONN SMRT CAPNOLINE

## (undated) DEVICE — Z DISCONTINUED USE 2220326 SUTURE VICRYL SZ 1 L27IN ABSRB UD L36MM CT-1 1/2 CIR JJ40G

## (undated) DEVICE — COUNTER NDL 30 COUNT FOAM STRP SGL MAG

## (undated) DEVICE — SOLUTION IV 1000ML 0.9% SOD CHL FOR IRRIG PLAS CONT

## (undated) DEVICE — 3M™ STERI-DRAPE™ U-DRAPE 1015: Brand: STERI-DRAPE™

## (undated) DEVICE — Device

## (undated) DEVICE — BIT DRL DIA2.7MM PERIPH SCR REUSE FOR COMPHSVE REV SHLDR

## (undated) DEVICE — HOOD, PEEL-AWAY: Brand: FLYTE

## (undated) DEVICE — HEWSON SUTURE RETRIEVER: Brand: HEWSON SUTURE RETRIEVER

## (undated) DEVICE — Z INACTIVE USE 2660663 SOLUTION IRRIG 1000ML STRIL H2O USP PLAS POUR BTL

## (undated) DEVICE — SUTURE MONOCRYL ABSORBABLE 3-0 PS-1 18 IN UD MONOCRYL + STRATAFIX SXMP1B102

## (undated) DEVICE — SNARE VASC L240CM LOOP W10MM SHTH DIA2.4MM RND STIFF CLD

## (undated) DEVICE — SUTURE VICRYL SZ 2-0 L18IN ABSRB UD CT-1 L36MM 1/2 CIR J839D

## (undated) DEVICE — 3M™ MICROFOAM™ SURGICAL TAPE 4 ROLLS/CARTON 6 CARTONS/CASE 1528-3: Brand: 3M™ MICROFOAM™

## (undated) DEVICE — ELECTRODE ES AD CRDLSS PT RET REM POLYHESIVE

## (undated) DEVICE — 3M™ STERI-DRAPE™ INSTRUMENT POUCH 1018: Brand: STERI-DRAPE™

## (undated) DEVICE — TUBING, SUCTION, 3/16" X 10', STRAIGHT: Brand: MEDLINE

## (undated) DEVICE — GLOVE SURG SZ 75 CRM LTX FREE POLYISOPRENE POLYMER BEAD ANTI

## (undated) DEVICE — GLOVE SURG SZ 8 CRM LTX FREE POLYISOPRENE POLYMER BEAD ANTI

## (undated) DEVICE — IMMOBILIZER SHLDR SWTH UNIV DEV BLK P.A.D. III

## (undated) DEVICE — GOWN,SIRUS,POLYRNF,BRTHSLV,XLN/XL,20/CS: Brand: MEDLINE

## (undated) DEVICE — SPONGE GZ W4XL8IN COT WVN 12 PLY

## (undated) DEVICE — SYRINGE IRRIG 60ML SFT PLIABLE BLB EZ TO GRP 1 HND USE W/

## (undated) DEVICE — Z DISCONTINUED PER MEDLINE BLADE CARBON-STEEL #10 STRL TWIN-BLADE